# Patient Record
Sex: MALE | Race: WHITE | NOT HISPANIC OR LATINO | Employment: OTHER | RURAL
[De-identification: names, ages, dates, MRNs, and addresses within clinical notes are randomized per-mention and may not be internally consistent; named-entity substitution may affect disease eponyms.]

---

## 2020-07-13 ENCOUNTER — HISTORICAL (OUTPATIENT)
Dept: ADMINISTRATIVE | Facility: HOSPITAL | Age: 70
End: 2020-07-13

## 2020-07-13 LAB — SARS-COV+SARS-COV-2 AG RESP QL IA.RAPID: POSITIVE

## 2020-07-27 ENCOUNTER — HISTORICAL (OUTPATIENT)
Dept: ADMINISTRATIVE | Facility: HOSPITAL | Age: 70
End: 2020-07-27

## 2020-07-27 LAB — SARS-COV+SARS-COV-2 AG RESP QL IA.RAPID: NEGATIVE

## 2020-10-20 ENCOUNTER — HISTORICAL (OUTPATIENT)
Dept: ADMINISTRATIVE | Facility: HOSPITAL | Age: 70
End: 2020-10-20

## 2020-10-20 LAB
CRP SERPL-MCNC: <0.29 MG/DL (ref 0–0.8)
ERYTHROCYTE [SEDIMENTATION RATE] IN BLOOD BY WESTERGREN METHOD: 16 MM/HR (ref 0–20)

## 2021-04-02 RX ORDER — ENALAPRIL MALEATE 20 MG/1
20 TABLET ORAL DAILY
Qty: 90 TABLET | Refills: 0 | Status: SHIPPED | OUTPATIENT
Start: 2021-04-02 | End: 2021-04-09 | Stop reason: SDUPTHER

## 2021-04-02 RX ORDER — ENALAPRIL MALEATE 20 MG/1
1 TABLET ORAL DAILY
COMMUNITY
Start: 2021-01-07 | End: 2021-04-02 | Stop reason: SDUPTHER

## 2021-04-08 VITALS
HEART RATE: 61 BPM | RESPIRATION RATE: 20 BRPM | SYSTOLIC BLOOD PRESSURE: 150 MMHG | DIASTOLIC BLOOD PRESSURE: 80 MMHG | HEIGHT: 72 IN | BODY MASS INDEX: 33.18 KG/M2 | TEMPERATURE: 91 F | WEIGHT: 245 LBS | OXYGEN SATURATION: 97 %

## 2021-04-08 RX ORDER — PNV NO.95/FERROUS FUM/FOLIC AC 28MG-0.8MG
1000 TABLET ORAL DAILY
COMMUNITY
End: 2023-02-21

## 2021-04-08 RX ORDER — FOLIC ACID 1 MG/1
1 TABLET ORAL DAILY
COMMUNITY
End: 2021-09-20 | Stop reason: SDUPTHER

## 2021-04-08 RX ORDER — GABAPENTIN 300 MG/1
300 CAPSULE ORAL 3 TIMES DAILY
COMMUNITY
Start: 2021-04-01 | End: 2022-03-17 | Stop reason: SDUPTHER

## 2021-04-08 RX ORDER — CARVEDILOL 3.12 MG/1
3.12 TABLET ORAL 2 TIMES DAILY
COMMUNITY
Start: 2021-02-10 | End: 2022-06-22 | Stop reason: SDUPTHER

## 2021-04-08 RX ORDER — DULAGLUTIDE 0.75 MG/.5ML
INJECTION, SOLUTION SUBCUTANEOUS
COMMUNITY
Start: 2020-12-30 | End: 2021-06-18

## 2021-04-08 RX ORDER — CITALOPRAM 10 MG/1
10 TABLET ORAL DAILY
COMMUNITY
Start: 2021-03-02 | End: 2022-08-16 | Stop reason: SDUPTHER

## 2021-04-08 RX ORDER — ATORVASTATIN CALCIUM 40 MG/1
10 TABLET, FILM COATED ORAL DAILY
COMMUNITY
End: 2022-01-27

## 2021-04-08 RX ORDER — MOXIFLOXACIN 5 MG/ML
1 SOLUTION/ DROPS OPHTHALMIC 3 TIMES DAILY
COMMUNITY
End: 2022-01-27

## 2021-04-09 ENCOUNTER — OFFICE VISIT (OUTPATIENT)
Dept: FAMILY MEDICINE | Facility: CLINIC | Age: 71
End: 2021-04-09
Payer: MEDICARE

## 2021-04-09 VITALS
RESPIRATION RATE: 18 BRPM | SYSTOLIC BLOOD PRESSURE: 150 MMHG | OXYGEN SATURATION: 97 % | HEART RATE: 61 BPM | HEIGHT: 71 IN | BODY MASS INDEX: 36.4 KG/M2 | TEMPERATURE: 98 F | WEIGHT: 260 LBS | DIASTOLIC BLOOD PRESSURE: 76 MMHG

## 2021-04-09 DIAGNOSIS — M54.50 CHRONIC LOW BACK PAIN WITHOUT SCIATICA, UNSPECIFIED BACK PAIN LATERALITY: ICD-10-CM

## 2021-04-09 DIAGNOSIS — I10 HYPERTENSION, UNSPECIFIED TYPE: Primary | ICD-10-CM

## 2021-04-09 DIAGNOSIS — G89.29 CHRONIC LOW BACK PAIN WITHOUT SCIATICA, UNSPECIFIED BACK PAIN LATERALITY: ICD-10-CM

## 2021-04-09 DIAGNOSIS — H61.21 HEARING LOSS OF RIGHT EAR DUE TO CERUMEN IMPACTION: ICD-10-CM

## 2021-04-09 DIAGNOSIS — E13.9 DIABETES 1.5, MANAGED AS TYPE 2: ICD-10-CM

## 2021-04-09 LAB — GLUCOSE SERPL-MCNC: 164 MG/DL (ref 70–110)

## 2021-04-09 PROCEDURE — 69209 PR REMOVAL IMPACTED CERUMEN USING IRRIGATION/LAVAGE, UNILATERAL: ICD-10-PCS | Mod: RT,,, | Performed by: FAMILY MEDICINE

## 2021-04-09 PROCEDURE — 99213 OFFICE O/P EST LOW 20 MIN: CPT | Mod: 25,,, | Performed by: FAMILY MEDICINE

## 2021-04-09 PROCEDURE — 99213 PR OFFICE/OUTPT VISIT, EST, LEVL III, 20-29 MIN: ICD-10-PCS | Mod: 25,,, | Performed by: FAMILY MEDICINE

## 2021-04-09 PROCEDURE — 69209 REMOVE IMPACTED EAR WAX UNI: CPT | Mod: RT,,, | Performed by: FAMILY MEDICINE

## 2021-04-09 PROCEDURE — 82962 GLUCOSE BLOOD TEST: CPT | Mod: RHCUB | Performed by: FAMILY MEDICINE

## 2021-04-09 RX ORDER — ENALAPRIL MALEATE 20 MG/1
20 TABLET ORAL DAILY
Qty: 90 TABLET | Refills: 0 | Status: SHIPPED | OUTPATIENT
Start: 2021-04-09 | End: 2021-10-14 | Stop reason: SDUPTHER

## 2021-04-09 RX ORDER — CYCLOBENZAPRINE HCL 10 MG
10 TABLET ORAL 2 TIMES DAILY
Qty: 180 TABLET | Refills: 1 | Status: SHIPPED | OUTPATIENT
Start: 2021-04-09 | End: 2021-11-09 | Stop reason: SDUPTHER

## 2021-04-09 RX ORDER — CYCLOBENZAPRINE HCL 10 MG
10 TABLET ORAL 2 TIMES DAILY
COMMUNITY
End: 2021-04-09 | Stop reason: SDUPTHER

## 2021-05-28 DIAGNOSIS — R26.9 ABNORMALITY OF GAIT: Primary | ICD-10-CM

## 2021-06-04 ENCOUNTER — OFFICE VISIT (OUTPATIENT)
Dept: FAMILY MEDICINE | Facility: CLINIC | Age: 71
End: 2021-06-04
Payer: MEDICARE

## 2021-06-04 VITALS
HEIGHT: 72 IN | WEIGHT: 270 LBS | HEART RATE: 63 BPM | OXYGEN SATURATION: 97 % | SYSTOLIC BLOOD PRESSURE: 132 MMHG | BODY MASS INDEX: 36.57 KG/M2 | DIASTOLIC BLOOD PRESSURE: 70 MMHG | RESPIRATION RATE: 20 BRPM

## 2021-06-04 DIAGNOSIS — E13.9 DIABETES 1.5, MANAGED AS TYPE 2: Primary | ICD-10-CM

## 2021-06-04 DIAGNOSIS — Z79.899 OTHER LONG TERM (CURRENT) DRUG THERAPY: ICD-10-CM

## 2021-06-04 DIAGNOSIS — E53.8 VITAMIN B12 DEFICIENCY: ICD-10-CM

## 2021-06-04 LAB
ALBUMIN SERPL BCP-MCNC: 3.8 G/DL (ref 3.5–5)
ALBUMIN/GLOB SERPL: 1.2 {RATIO}
ALP SERPL-CCNC: 130 U/L (ref 45–115)
ALT SERPL W P-5'-P-CCNC: 26 U/L (ref 16–61)
ANION GAP SERPL CALCULATED.3IONS-SCNC: 14 MMOL/L (ref 7–16)
AST SERPL W P-5'-P-CCNC: 22 U/L (ref 15–37)
BASOPHILS # BLD AUTO: 0.03 K/UL (ref 0–0.2)
BASOPHILS NFR BLD AUTO: 0.4 % (ref 0–1)
BILIRUB SERPL-MCNC: 0.3 MG/DL (ref 0–1.2)
BUN SERPL-MCNC: 20 MG/DL (ref 7–18)
BUN/CREAT SERPL: 18 (ref 6–20)
CALCIUM SERPL-MCNC: 8.8 MG/DL (ref 8.5–10.1)
CHLORIDE SERPL-SCNC: 104 MMOL/L (ref 98–107)
CHOLEST SERPL-MCNC: 167 MG/DL (ref 0–200)
CHOLEST/HDLC SERPL: 3.8 {RATIO}
CO2 SERPL-SCNC: 26 MMOL/L (ref 21–32)
CREAT SERPL-MCNC: 1.12 MG/DL (ref 0.7–1.3)
DIFFERENTIAL METHOD BLD: ABNORMAL
EOSINOPHIL # BLD AUTO: 0.14 K/UL (ref 0–0.5)
EOSINOPHIL NFR BLD AUTO: 1.7 % (ref 1–4)
ERYTHROCYTE [DISTWIDTH] IN BLOOD BY AUTOMATED COUNT: 13.1 % (ref 11.5–14.5)
EST. AVERAGE GLUCOSE BLD GHB EST-MCNC: 214 MG/DL
GLOBULIN SER-MCNC: 3.2 G/DL (ref 2–4)
GLUCOSE SERPL-MCNC: 219 MG/DL (ref 74–106)
HBA1C MFR BLD HPLC: 9 % (ref 4.5–6.6)
HCT VFR BLD AUTO: 41.7 % (ref 40–54)
HDLC SERPL-MCNC: 44 MG/DL (ref 40–60)
HGB BLD-MCNC: 13.6 G/DL (ref 13.5–18)
IMM GRANULOCYTES # BLD AUTO: 0.04 K/UL (ref 0–0.04)
IMM GRANULOCYTES NFR BLD: 0.5 % (ref 0–0.4)
LDLC SERPL CALC-MCNC: 96 MG/DL
LDLC/HDLC SERPL: 2.2 {RATIO}
LYMPHOCYTES # BLD AUTO: 1.93 K/UL (ref 1–4.8)
LYMPHOCYTES NFR BLD AUTO: 23.4 % (ref 27–41)
MCH RBC QN AUTO: 29.7 PG (ref 27–31)
MCHC RBC AUTO-ENTMCNC: 32.6 G/DL (ref 32–36)
MCV RBC AUTO: 91 FL (ref 80–96)
MONOCYTES # BLD AUTO: 0.7 K/UL (ref 0–0.8)
MONOCYTES NFR BLD AUTO: 8.5 % (ref 2–6)
MPC BLD CALC-MCNC: 10.9 FL (ref 9.4–12.4)
NEUTROPHILS # BLD AUTO: 5.4 K/UL (ref 1.8–7.7)
NEUTROPHILS NFR BLD AUTO: 65.5 % (ref 53–65)
NONHDLC SERPL-MCNC: 123 MG/DL
NRBC # BLD AUTO: 0 X10E3/UL
NRBC, AUTO (.00): 0 %
PLATELET # BLD AUTO: 223 K/UL (ref 150–400)
POTASSIUM SERPL-SCNC: 4.5 MMOL/L (ref 3.5–5.1)
PROT SERPL-MCNC: 7 G/DL (ref 6.4–8.2)
RBC # BLD AUTO: 4.58 M/UL (ref 4.6–6.2)
SODIUM SERPL-SCNC: 139 MMOL/L (ref 136–145)
TRIGL SERPL-MCNC: 137 MG/DL (ref 35–150)
VIT B12 SERPL-MCNC: 291 PG/ML (ref 193–986)
VLDLC SERPL-MCNC: 27 MG/DL
WBC # BLD AUTO: 8.24 K/UL (ref 4.5–11)

## 2021-06-04 PROCEDURE — 99213 OFFICE O/P EST LOW 20 MIN: CPT | Mod: ,,, | Performed by: FAMILY MEDICINE

## 2021-06-04 PROCEDURE — 80061 LIPID PANEL: CPT | Mod: ,,, | Performed by: CLINICAL MEDICAL LABORATORY

## 2021-06-04 PROCEDURE — 80053 COMPREHEN METABOLIC PANEL: CPT | Mod: ,,, | Performed by: CLINICAL MEDICAL LABORATORY

## 2021-06-04 PROCEDURE — 83036 HEMOGLOBIN GLYCOSYLATED A1C: CPT | Mod: ,,, | Performed by: CLINICAL MEDICAL LABORATORY

## 2021-06-04 PROCEDURE — 80061 LIPID PANEL: ICD-10-PCS | Mod: ,,, | Performed by: CLINICAL MEDICAL LABORATORY

## 2021-06-04 PROCEDURE — 83036 HEMOGLOBIN A1C: ICD-10-PCS | Mod: ,,, | Performed by: CLINICAL MEDICAL LABORATORY

## 2021-06-04 PROCEDURE — 80053 COMPREHENSIVE METABOLIC PANEL: ICD-10-PCS | Mod: ,,, | Performed by: CLINICAL MEDICAL LABORATORY

## 2021-06-04 PROCEDURE — 99213 PR OFFICE/OUTPT VISIT, EST, LEVL III, 20-29 MIN: ICD-10-PCS | Mod: ,,, | Performed by: FAMILY MEDICINE

## 2021-06-04 PROCEDURE — 82607 VITAMIN B12: ICD-10-PCS | Mod: ,,, | Performed by: CLINICAL MEDICAL LABORATORY

## 2021-06-04 PROCEDURE — 82607 VITAMIN B-12: CPT | Mod: ,,, | Performed by: CLINICAL MEDICAL LABORATORY

## 2021-06-04 PROCEDURE — 85025 COMPLETE CBC W/AUTO DIFF WBC: CPT | Mod: ,,, | Performed by: CLINICAL MEDICAL LABORATORY

## 2021-06-04 PROCEDURE — 85025 CBC WITH DIFFERENTIAL: ICD-10-PCS | Mod: ,,, | Performed by: CLINICAL MEDICAL LABORATORY

## 2021-06-07 ENCOUNTER — TELEPHONE (OUTPATIENT)
Dept: FAMILY MEDICINE | Facility: CLINIC | Age: 71
End: 2021-06-07

## 2021-06-08 ENCOUNTER — CLINICAL SUPPORT (OUTPATIENT)
Dept: REHABILITATION | Facility: HOSPITAL | Age: 71
End: 2021-06-08
Payer: MEDICARE

## 2021-06-08 ENCOUNTER — TELEPHONE (OUTPATIENT)
Dept: FAMILY MEDICINE | Facility: CLINIC | Age: 71
End: 2021-06-08

## 2021-06-08 DIAGNOSIS — R26.89 IMPAIRMENT OF BALANCE: ICD-10-CM

## 2021-06-08 DIAGNOSIS — R26.9 ABNORMALITY OF GAIT: Primary | ICD-10-CM

## 2021-06-08 DIAGNOSIS — R29.898 LEG WEAKNESS, BILATERAL: ICD-10-CM

## 2021-06-08 PROCEDURE — 97162 PT EVAL MOD COMPLEX 30 MIN: CPT

## 2021-06-11 ENCOUNTER — CLINICAL SUPPORT (OUTPATIENT)
Dept: REHABILITATION | Facility: HOSPITAL | Age: 71
End: 2021-06-11
Payer: MEDICARE

## 2021-06-11 DIAGNOSIS — R29.898 LEG WEAKNESS, BILATERAL: Primary | ICD-10-CM

## 2021-06-11 DIAGNOSIS — R26.89 IMPAIRMENT OF BALANCE: ICD-10-CM

## 2021-06-11 PROCEDURE — 97110 THERAPEUTIC EXERCISES: CPT

## 2021-06-15 ENCOUNTER — CLINICAL SUPPORT (OUTPATIENT)
Dept: REHABILITATION | Facility: HOSPITAL | Age: 71
End: 2021-06-15
Payer: MEDICARE

## 2021-06-15 DIAGNOSIS — R26.9 ABNORMALITY OF GAIT: Primary | ICD-10-CM

## 2021-06-15 DIAGNOSIS — R29.898 LEG WEAKNESS, BILATERAL: ICD-10-CM

## 2021-06-15 DIAGNOSIS — R26.89 IMPAIRMENT OF BALANCE: ICD-10-CM

## 2021-06-15 PROCEDURE — 97110 THERAPEUTIC EXERCISES: CPT

## 2021-06-15 PROCEDURE — 97112 NEUROMUSCULAR REEDUCATION: CPT

## 2021-06-17 ENCOUNTER — CLINICAL SUPPORT (OUTPATIENT)
Dept: REHABILITATION | Facility: HOSPITAL | Age: 71
End: 2021-06-17
Payer: MEDICARE

## 2021-06-17 DIAGNOSIS — R26.9 ABNORMALITY OF GAIT: Primary | ICD-10-CM

## 2021-06-17 DIAGNOSIS — R29.898 LEG WEAKNESS, BILATERAL: ICD-10-CM

## 2021-06-17 DIAGNOSIS — R26.89 IMPAIRMENT OF BALANCE: ICD-10-CM

## 2021-06-17 PROCEDURE — 97112 NEUROMUSCULAR REEDUCATION: CPT

## 2021-06-17 PROCEDURE — 97110 THERAPEUTIC EXERCISES: CPT

## 2021-06-18 ENCOUNTER — OFFICE VISIT (OUTPATIENT)
Dept: FAMILY MEDICINE | Facility: CLINIC | Age: 71
End: 2021-06-18
Payer: MEDICARE

## 2021-06-18 VITALS
BODY MASS INDEX: 36.57 KG/M2 | SYSTOLIC BLOOD PRESSURE: 154 MMHG | RESPIRATION RATE: 20 BRPM | OXYGEN SATURATION: 96 % | WEIGHT: 270 LBS | HEIGHT: 72 IN | HEART RATE: 56 BPM | DIASTOLIC BLOOD PRESSURE: 74 MMHG

## 2021-06-18 DIAGNOSIS — E53.8 LOW SERUM VITAMIN B12: ICD-10-CM

## 2021-06-18 DIAGNOSIS — E11.9 DIABETES MELLITUS WITHOUT COMPLICATION: Primary | ICD-10-CM

## 2021-06-18 DIAGNOSIS — R74.8 ELEVATED LIVER ENZYMES: ICD-10-CM

## 2021-06-18 PROCEDURE — 99213 PR OFFICE/OUTPT VISIT, EST, LEVL III, 20-29 MIN: ICD-10-PCS | Mod: 25,,, | Performed by: FAMILY MEDICINE

## 2021-06-18 PROCEDURE — 96372 THER/PROPH/DIAG INJ SC/IM: CPT | Mod: ,,, | Performed by: FAMILY MEDICINE

## 2021-06-18 PROCEDURE — 99213 OFFICE O/P EST LOW 20 MIN: CPT | Mod: 25,,, | Performed by: FAMILY MEDICINE

## 2021-06-18 PROCEDURE — 96372 PR INJECTION,THERAP/PROPH/DIAG2ST, IM OR SUBCUT: ICD-10-PCS | Mod: ,,, | Performed by: FAMILY MEDICINE

## 2021-06-18 RX ORDER — CYANOCOBALAMIN 1000 UG/ML
1000 INJECTION, SOLUTION INTRAMUSCULAR; SUBCUTANEOUS
Status: COMPLETED | OUTPATIENT
Start: 2021-06-18 | End: 2021-06-18

## 2021-06-18 RX ORDER — DULAGLUTIDE 1.5 MG/.5ML
1.5 INJECTION, SOLUTION SUBCUTANEOUS
Qty: 4 PEN | Refills: 3 | Status: SHIPPED | OUTPATIENT
Start: 2021-06-18 | End: 2021-09-16 | Stop reason: DRUGHIGH

## 2021-06-18 RX ADMIN — CYANOCOBALAMIN 1000 MCG: 1000 INJECTION, SOLUTION INTRAMUSCULAR; SUBCUTANEOUS at 02:06

## 2021-06-23 ENCOUNTER — CLINICAL SUPPORT (OUTPATIENT)
Dept: REHABILITATION | Facility: HOSPITAL | Age: 71
End: 2021-06-23
Payer: MEDICARE

## 2021-06-23 DIAGNOSIS — R26.89 IMPAIRMENT OF BALANCE: ICD-10-CM

## 2021-06-23 DIAGNOSIS — R29.898 LEG WEAKNESS, BILATERAL: ICD-10-CM

## 2021-06-23 DIAGNOSIS — R26.9 ABNORMALITY OF GAIT: Primary | ICD-10-CM

## 2021-06-23 PROCEDURE — 97110 THERAPEUTIC EXERCISES: CPT

## 2021-06-23 PROCEDURE — 97112 NEUROMUSCULAR REEDUCATION: CPT

## 2021-06-25 ENCOUNTER — CLINICAL SUPPORT (OUTPATIENT)
Dept: REHABILITATION | Facility: HOSPITAL | Age: 71
End: 2021-06-25
Payer: MEDICARE

## 2021-06-25 DIAGNOSIS — R26.89 IMPAIRMENT OF BALANCE: ICD-10-CM

## 2021-06-25 DIAGNOSIS — R26.9 ABNORMALITY OF GAIT: Primary | ICD-10-CM

## 2021-06-25 DIAGNOSIS — R29.898 LEG WEAKNESS, BILATERAL: ICD-10-CM

## 2021-06-25 PROCEDURE — 97110 THERAPEUTIC EXERCISES: CPT

## 2021-06-25 PROCEDURE — 97112 NEUROMUSCULAR REEDUCATION: CPT

## 2021-06-29 ENCOUNTER — CLINICAL SUPPORT (OUTPATIENT)
Dept: REHABILITATION | Facility: HOSPITAL | Age: 71
End: 2021-06-29
Payer: MEDICARE

## 2021-06-29 DIAGNOSIS — R26.89 IMPAIRMENT OF BALANCE: Primary | ICD-10-CM

## 2021-06-29 DIAGNOSIS — R29.898 LEG WEAKNESS, BILATERAL: ICD-10-CM

## 2021-06-29 PROCEDURE — 97112 NEUROMUSCULAR REEDUCATION: CPT

## 2021-06-29 PROCEDURE — 97110 THERAPEUTIC EXERCISES: CPT

## 2021-07-01 ENCOUNTER — CLINICAL SUPPORT (OUTPATIENT)
Dept: REHABILITATION | Facility: HOSPITAL | Age: 71
End: 2021-07-01
Payer: MEDICARE

## 2021-07-01 ENCOUNTER — PATIENT MESSAGE (OUTPATIENT)
Dept: ADMINISTRATIVE | Facility: OTHER | Age: 71
End: 2021-07-01

## 2021-07-01 DIAGNOSIS — R26.89 IMPAIRMENT OF BALANCE: ICD-10-CM

## 2021-07-01 DIAGNOSIS — R29.898 LEG WEAKNESS, BILATERAL: ICD-10-CM

## 2021-07-01 DIAGNOSIS — R26.9 ABNORMALITY OF GAIT: Primary | ICD-10-CM

## 2021-07-01 PROCEDURE — 97112 NEUROMUSCULAR REEDUCATION: CPT

## 2021-07-01 PROCEDURE — 97110 THERAPEUTIC EXERCISES: CPT

## 2021-07-02 ENCOUNTER — TELEPHONE (OUTPATIENT)
Dept: FAMILY MEDICINE | Facility: CLINIC | Age: 71
End: 2021-07-02

## 2021-07-02 DIAGNOSIS — R74.8 ABNORMAL LEVELS OF OTHER SERUM ENZYMES: Primary | ICD-10-CM

## 2021-07-09 ENCOUNTER — CLINICAL SUPPORT (OUTPATIENT)
Dept: REHABILITATION | Facility: HOSPITAL | Age: 71
End: 2021-07-09
Payer: MEDICARE

## 2021-07-09 DIAGNOSIS — R26.9 ABNORMALITY OF GAIT: Primary | ICD-10-CM

## 2021-07-09 DIAGNOSIS — R26.89 IMPAIRMENT OF BALANCE: ICD-10-CM

## 2021-07-09 DIAGNOSIS — R29.898 LEG WEAKNESS, BILATERAL: ICD-10-CM

## 2021-07-09 PROCEDURE — 97110 THERAPEUTIC EXERCISES: CPT

## 2021-07-12 ENCOUNTER — HOSPITAL ENCOUNTER (OUTPATIENT)
Dept: RADIOLOGY | Facility: HOSPITAL | Age: 71
Discharge: HOME | End: 2021-07-12
Attending: FAMILY MEDICINE
Payer: MEDICARE

## 2021-07-12 ENCOUNTER — TELEPHONE (OUTPATIENT)
Dept: FAMILY MEDICINE | Facility: CLINIC | Age: 71
End: 2021-07-12

## 2021-07-12 ENCOUNTER — OFFICE VISIT (OUTPATIENT)
Dept: FAMILY MEDICINE | Facility: CLINIC | Age: 71
End: 2021-07-12
Payer: MEDICARE

## 2021-07-12 VITALS
HEART RATE: 87 BPM | OXYGEN SATURATION: 94 % | DIASTOLIC BLOOD PRESSURE: 58 MMHG | HEIGHT: 72 IN | SYSTOLIC BLOOD PRESSURE: 156 MMHG | WEIGHT: 270 LBS | RESPIRATION RATE: 18 BRPM | BODY MASS INDEX: 36.57 KG/M2

## 2021-07-12 DIAGNOSIS — N28.89 RENAL MASS, RIGHT: Primary | ICD-10-CM

## 2021-07-12 DIAGNOSIS — R74.8 ABNORMAL LEVELS OF OTHER SERUM ENZYMES: ICD-10-CM

## 2021-07-12 DIAGNOSIS — N28.89 OTHER SPECIFIED DISORDERS OF KIDNEY AND URETER: ICD-10-CM

## 2021-07-12 PROCEDURE — 99212 OFFICE O/P EST SF 10 MIN: CPT | Mod: ,,, | Performed by: FAMILY MEDICINE

## 2021-07-12 PROCEDURE — 76705 ECHO EXAM OF ABDOMEN: CPT | Mod: TC

## 2021-07-12 PROCEDURE — 76705 US ABDOMEN LIMITED_LIVER: ICD-10-PCS | Mod: 26,,, | Performed by: STUDENT IN AN ORGANIZED HEALTH CARE EDUCATION/TRAINING PROGRAM

## 2021-07-12 PROCEDURE — 76705 ECHO EXAM OF ABDOMEN: CPT | Mod: 26,,, | Performed by: STUDENT IN AN ORGANIZED HEALTH CARE EDUCATION/TRAINING PROGRAM

## 2021-07-12 PROCEDURE — 99212 PR OFFICE/OUTPT VISIT, EST, LEVL II, 10-19 MIN: ICD-10-PCS | Mod: ,,, | Performed by: FAMILY MEDICINE

## 2021-07-14 ENCOUNTER — CLINICAL SUPPORT (OUTPATIENT)
Dept: REHABILITATION | Facility: HOSPITAL | Age: 71
End: 2021-07-14
Payer: MEDICARE

## 2021-07-14 DIAGNOSIS — R29.898 LEG WEAKNESS, BILATERAL: ICD-10-CM

## 2021-07-14 DIAGNOSIS — R26.89 IMPAIRMENT OF BALANCE: Primary | ICD-10-CM

## 2021-07-14 PROCEDURE — 97110 THERAPEUTIC EXERCISES: CPT

## 2021-07-14 PROCEDURE — 97112 NEUROMUSCULAR REEDUCATION: CPT

## 2021-07-20 ENCOUNTER — TELEPHONE (OUTPATIENT)
Dept: FAMILY MEDICINE | Facility: CLINIC | Age: 71
End: 2021-07-20

## 2021-07-21 ENCOUNTER — DOCUMENTATION ONLY (OUTPATIENT)
Dept: REHABILITATION | Facility: HOSPITAL | Age: 71
End: 2021-07-21

## 2021-07-21 NOTE — PROGRESS NOTES
PHYSICAL THERAPY DISCHARGE:    Name: Davon STAPLES [facility] Number: [id number]  Diagnosis: Abnormality of Gait       Encounter Diagnoses   Name Primary?    Impairment of balance Yes    Leg weakness, bilateral        Physician: Romulo Rice MD    SUBJECTIVE    This patient was originally evaluated at our facility on: 6/8/21    Pt attended PT for a total of 10 Visits receiving: Therex, Body Mechanics Training, balance training and Home Exercise Instruction.    This patient's last visit occurred on: 7/14/21    OBJECTIVE:    Knee AROM Right Left   extension WFL -25   flexion WFL 65      Knee Strength Right Left   extension 5/5 3-/5   flexion 5/5 3-/5      Hip Flexion AROM  Right 110 degrees  Left 105 degrees     Hip Strength Right Left   Flexion 5/5 4-   Abduction 4/5 4-   Extension 3+/5 3+     ASSESSMENT:    Patient has a left knee contracture which contributes to his gait instability. Therapy unable to improve or correct knee contracture.    Short Term Goals: 3 weeks   1. Patient will increase bilateral hip flexion to 100 degrees to allow patient to perform activities of daily living without limitations. Goal Met.  2. Patient will increase bilateral lower extremity hip strength to 4+/5 to allow patient to ambulate without deviations. Right LE Met.  3. Pt will increase left knee flexion to 75 degrees, knee extension to 5 degrees to allow patient normal gait pattern. Unable to improve due to knee contracture.  4. Pt will increase bilateral knee strength to 4+/5 to allow patient to safely return to prior level of function. Right LE Met. Unable to improve left knee due to contracture.     Long Term Goals: 6 weeks   1. Pt will improve Tinetti Gait and Balance to 25/28 to reduce fall risk to low for safety with ambulation. Tinetti score 24/28 low fall risk.  2. Pt will improve TUG test to 12 seconds or less to reduce fall risk and increase gait speed and safety. Not Met.      Pt was DC'd from our care secondary to:  Patient's spouse notified therapist that patient will discontinue therapy secondary to recent diagnosis of kidney cancer requiring surgery.       Therapist: Adin Kaminski, PT  7/21/2021

## 2021-08-20 ENCOUNTER — OFFICE VISIT (OUTPATIENT)
Dept: FAMILY MEDICINE | Facility: CLINIC | Age: 71
End: 2021-08-20
Payer: MEDICARE

## 2021-08-20 VITALS
SYSTOLIC BLOOD PRESSURE: 128 MMHG | WEIGHT: 260 LBS | BODY MASS INDEX: 35.21 KG/M2 | HEART RATE: 80 BPM | DIASTOLIC BLOOD PRESSURE: 72 MMHG | RESPIRATION RATE: 20 BRPM | HEIGHT: 72 IN | TEMPERATURE: 98 F | OXYGEN SATURATION: 96 %

## 2021-08-20 DIAGNOSIS — E78.5 HYPERLIPIDEMIA, UNSPECIFIED HYPERLIPIDEMIA TYPE: ICD-10-CM

## 2021-08-20 DIAGNOSIS — E11.9 DIABETES MELLITUS WITHOUT COMPLICATION: Primary | ICD-10-CM

## 2021-08-20 DIAGNOSIS — E53.8 VITAMIN B12 DEFICIENCY: ICD-10-CM

## 2021-08-20 PROCEDURE — 99214 PR OFFICE/OUTPT VISIT, EST, LEVL IV, 30-39 MIN: ICD-10-PCS | Mod: 25,,, | Performed by: FAMILY MEDICINE

## 2021-08-20 PROCEDURE — 96372 THER/PROPH/DIAG INJ SC/IM: CPT | Mod: ,,, | Performed by: FAMILY MEDICINE

## 2021-08-20 PROCEDURE — 99214 OFFICE O/P EST MOD 30 MIN: CPT | Mod: 25,,, | Performed by: FAMILY MEDICINE

## 2021-08-20 PROCEDURE — 96372 PR INJECTION,THERAP/PROPH/DIAG2ST, IM OR SUBCUT: ICD-10-PCS | Mod: ,,, | Performed by: FAMILY MEDICINE

## 2021-08-20 RX ORDER — CYANOCOBALAMIN 1000 UG/ML
1000 INJECTION, SOLUTION INTRAMUSCULAR; SUBCUTANEOUS ONCE
Status: COMPLETED | OUTPATIENT
Start: 2021-08-20 | End: 2021-08-20

## 2021-08-20 RX ADMIN — CYANOCOBALAMIN 1000 MCG: 1000 INJECTION, SOLUTION INTRAMUSCULAR; SUBCUTANEOUS at 03:08

## 2021-09-16 ENCOUNTER — OFFICE VISIT (OUTPATIENT)
Dept: FAMILY MEDICINE | Facility: CLINIC | Age: 71
End: 2021-09-16
Payer: MEDICARE

## 2021-09-16 VITALS
BODY MASS INDEX: 35.21 KG/M2 | OXYGEN SATURATION: 98 % | SYSTOLIC BLOOD PRESSURE: 136 MMHG | DIASTOLIC BLOOD PRESSURE: 72 MMHG | HEART RATE: 65 BPM | WEIGHT: 260 LBS | HEIGHT: 72 IN | RESPIRATION RATE: 18 BRPM

## 2021-09-16 DIAGNOSIS — G47.30 SLEEP APNEA, UNSPECIFIED TYPE: ICD-10-CM

## 2021-09-16 DIAGNOSIS — Z23 NEED FOR PROPHYLACTIC VACCINATION AND INOCULATION AGAINST INFLUENZA: ICD-10-CM

## 2021-09-16 DIAGNOSIS — E11.9 DIABETES MELLITUS WITHOUT COMPLICATION: ICD-10-CM

## 2021-09-16 DIAGNOSIS — R44.3 HALLUCINATIONS: Primary | ICD-10-CM

## 2021-09-16 PROCEDURE — 90686 FLU VACCINE (QUAD) GREATER THAN OR EQUAL TO 3YO PRESERVATIVE FREE IM: ICD-10-PCS | Mod: ,,, | Performed by: FAMILY MEDICINE

## 2021-09-16 PROCEDURE — G0008 ADMIN INFLUENZA VIRUS VAC: HCPCS | Mod: ,,, | Performed by: FAMILY MEDICINE

## 2021-09-16 PROCEDURE — 99213 PR OFFICE/OUTPT VISIT, EST, LEVL III, 20-29 MIN: ICD-10-PCS | Mod: ,,, | Performed by: FAMILY MEDICINE

## 2021-09-16 PROCEDURE — 99213 OFFICE O/P EST LOW 20 MIN: CPT | Mod: ,,, | Performed by: FAMILY MEDICINE

## 2021-09-16 PROCEDURE — 90686 IIV4 VACC NO PRSV 0.5 ML IM: CPT | Mod: ,,, | Performed by: FAMILY MEDICINE

## 2021-09-16 PROCEDURE — G0008 FLU VACCINE (QUAD) GREATER THAN OR EQUAL TO 3YO PRESERVATIVE FREE IM: ICD-10-PCS | Mod: ,,, | Performed by: FAMILY MEDICINE

## 2021-09-16 RX ORDER — DULAGLUTIDE 3 MG/.5ML
3 INJECTION, SOLUTION SUBCUTANEOUS
Qty: 4 PEN | Refills: 5 | Status: SHIPPED | OUTPATIENT
Start: 2021-09-16 | End: 2022-03-03

## 2021-09-20 RX ORDER — FOLIC ACID 1 MG/1
1 TABLET ORAL DAILY
Qty: 90 TABLET | Refills: 1 | Status: SHIPPED | OUTPATIENT
Start: 2021-09-20 | End: 2023-02-21

## 2021-09-30 ENCOUNTER — OFFICE VISIT (OUTPATIENT)
Dept: FAMILY MEDICINE | Facility: CLINIC | Age: 71
End: 2021-09-30
Payer: MEDICARE

## 2021-09-30 VITALS
TEMPERATURE: 98 F | WEIGHT: 260 LBS | BODY MASS INDEX: 35.21 KG/M2 | HEART RATE: 73 BPM | HEIGHT: 72 IN | DIASTOLIC BLOOD PRESSURE: 70 MMHG | RESPIRATION RATE: 18 BRPM | SYSTOLIC BLOOD PRESSURE: 144 MMHG | OXYGEN SATURATION: 97 %

## 2021-09-30 DIAGNOSIS — E11.9 DIABETES MELLITUS WITHOUT COMPLICATION: Primary | ICD-10-CM

## 2021-09-30 PROCEDURE — 99212 PR OFFICE/OUTPT VISIT, EST, LEVL II, 10-19 MIN: ICD-10-PCS | Mod: ,,, | Performed by: FAMILY MEDICINE

## 2021-09-30 PROCEDURE — 99212 OFFICE O/P EST SF 10 MIN: CPT | Mod: ,,, | Performed by: FAMILY MEDICINE

## 2021-10-01 DIAGNOSIS — R41.0 CONFUSION: Primary | ICD-10-CM

## 2021-10-14 DIAGNOSIS — I10 HYPERTENSION, UNSPECIFIED TYPE: ICD-10-CM

## 2021-10-14 RX ORDER — ENALAPRIL MALEATE 20 MG/1
20 TABLET ORAL DAILY
Qty: 90 TABLET | Refills: 0 | Status: SHIPPED | OUTPATIENT
Start: 2021-10-14 | End: 2022-01-26 | Stop reason: SDUPTHER

## 2021-11-09 DIAGNOSIS — M54.50 CHRONIC LOW BACK PAIN WITHOUT SCIATICA, UNSPECIFIED BACK PAIN LATERALITY: ICD-10-CM

## 2021-11-09 DIAGNOSIS — G89.29 CHRONIC LOW BACK PAIN WITHOUT SCIATICA, UNSPECIFIED BACK PAIN LATERALITY: ICD-10-CM

## 2021-11-09 RX ORDER — CYCLOBENZAPRINE HCL 10 MG
10 TABLET ORAL 2 TIMES DAILY
Qty: 180 TABLET | Refills: 1 | Status: SHIPPED | OUTPATIENT
Start: 2021-11-09 | End: 2022-05-17

## 2022-01-07 ENCOUNTER — OFFICE VISIT (OUTPATIENT)
Dept: FAMILY MEDICINE | Facility: CLINIC | Age: 72
End: 2022-01-07
Payer: MEDICARE

## 2022-01-07 DIAGNOSIS — R19.4 CHANGE IN BOWEL HABITS: ICD-10-CM

## 2022-01-07 DIAGNOSIS — E11.9 DIABETES MELLITUS WITHOUT COMPLICATION: ICD-10-CM

## 2022-01-07 DIAGNOSIS — K59.00 CONSTIPATION, UNSPECIFIED CONSTIPATION TYPE: Primary | ICD-10-CM

## 2022-01-07 PROCEDURE — 99212 PR OFFICE/OUTPT VISIT, EST, LEVL II, 10-19 MIN: ICD-10-PCS | Mod: ,,, | Performed by: FAMILY MEDICINE

## 2022-01-07 PROCEDURE — 99212 OFFICE O/P EST SF 10 MIN: CPT | Mod: ,,, | Performed by: FAMILY MEDICINE

## 2022-01-12 VITALS
HEIGHT: 72 IN | OXYGEN SATURATION: 99 % | DIASTOLIC BLOOD PRESSURE: 88 MMHG | WEIGHT: 260 LBS | BODY MASS INDEX: 35.21 KG/M2 | HEART RATE: 62 BPM | SYSTOLIC BLOOD PRESSURE: 116 MMHG | RESPIRATION RATE: 18 BRPM | TEMPERATURE: 98 F

## 2022-01-12 NOTE — PROGRESS NOTES
Davon Barrios is a 71 y.o. male seen today for symptoms of increased constipation with fecal incontinence at night.  Patient has had no recent GI evaluation and colonoscopy.  He denies any blood in his stool.  Blood sugars are mildly elevated I encouraged him to continue to take his medication and improve his diet.    Past Medical History:   Diagnosis Date    Carcinoma of prostate     Hypertension     Type 2 diabetes mellitus     Vitamin B 12 deficiency(non anemic)      Family History   Problem Relation Age of Onset    Diabetes Mother     Rectal cancer Father     Diabetes Father     Hypertension Father     Lung cancer Father     Hypertension Sister     Hypertension Brother      Current Outpatient Medications on File Prior to Visit   Medication Sig Dispense Refill    ADULT LOW DOSE ASPIRIN ORAL Take 81 mg by mouth.      atorvastatin (LIPITOR) 40 MG tablet Take 40 mg by mouth once daily.      carvediloL (COREG) 3.125 MG tablet       citalopram (CELEXA) 10 MG tablet       cyclobenzaprine (FLEXERIL) 10 MG tablet Take 1 tablet (10 mg total) by mouth 2 (two) times a day. 180 tablet 1    dulaglutide (TRULICITY) 3 mg/0.5 mL pen injector Inject 3 mg into the skin every 7 days. 4 pen 5    enalapril (VASOTEC) 20 MG tablet Take 1 tablet (20 mg total) by mouth once daily. 90 tablet 0    folic acid (FOLVITE) 1 MG tablet Take 1 tablet (1 mg total) by mouth once daily. 90 tablet 1    gabapentin (NEURONTIN) 300 MG capsule       insulin aspart prot/insuln asp (NOVOLOG MIX 70-30 U-100 INSULN SUBQ) Inject subcutaneously 50 units every morning and 50 units every night      moxifloxacin (VIGAMOX) 0.5 % ophthalmic solution 1 drop 3 (three) times daily.      vitamin E 1000 UNIT capsule Take 1,000 Units by mouth once daily.       No current facility-administered medications on file prior to visit.     Immunization History   Administered Date(s) Administered    COVID-19, MRNA, LN-S, PF (MODERNA FULL 0.5 ML DOSE)  03/05/2021, 04/06/2021    Influenza - High Dose - PF (65 years and older) 03/01/2021    Influenza - Quadrivalent - PF *Preferred* (6 months and older) 09/16/2021    Influenza Split 01/01/2015    Pneumococcal Conjugate - 13 Valent 03/01/2021       Review of Systems   Constitutional: Negative for fever, malaise/fatigue and weight loss.   Respiratory: Negative for shortness of breath.    Cardiovascular: Negative for chest pain and palpitations.   Gastrointestinal: Positive for constipation. Negative for blood in stool, nausea and vomiting.   Psychiatric/Behavioral: Negative for depression.        Vitals:    01/12/22 0832   BP: 116/88   Pulse: 62   Resp: 18   Temp: 98.1 °F (36.7 °C)       Physical Exam  Vitals reviewed.   Constitutional:       Appearance: Normal appearance.   HENT:      Head: Normocephalic.   Eyes:      Extraocular Movements: Extraocular movements intact.      Conjunctiva/sclera: Conjunctivae normal.      Pupils: Pupils are equal, round, and reactive to light.   Neck:      Thyroid: No thyroid mass or thyromegaly.   Cardiovascular:      Rate and Rhythm: Normal rate and regular rhythm.      Heart sounds: Normal heart sounds. No murmur heard.  No gallop.    Pulmonary:      Effort: Pulmonary effort is normal. No respiratory distress.      Breath sounds: Normal breath sounds. No wheezing or rales.   Abdominal:      General: Bowel sounds are normal.      Tenderness: There is no abdominal tenderness.   Skin:     General: Skin is warm and dry.      Coloration: Skin is not jaundiced or pale.   Neurological:      Mental Status: He is alert.   Psychiatric:         Mood and Affect: Mood normal.         Behavior: Behavior normal.         Thought Content: Thought content normal.         Judgment: Judgment normal.          Assessment and Plan  Constipation, unspecified constipation type  -     Ambulatory referral/consult to Gastroenterology; Future; Expected date: 01/19/2022    Diabetes mellitus without  complication    Change in bowel habits  -     Ambulatory referral/consult to Gastroenterology; Future; Expected date: 01/19/2022            Return to clinic in 3 weeks with his blood sugar readings.    Health Maintenance Topics with due status: Not Due       Topic Last Completion Date    Lipid Panel 09/07/2021    Low Dose Statin 01/12/2022

## 2022-01-13 ENCOUNTER — TELEPHONE (OUTPATIENT)
Dept: GASTROENTEROLOGY | Facility: CLINIC | Age: 72
End: 2022-01-13
Payer: MEDICARE

## 2022-01-13 ENCOUNTER — OFFICE VISIT (OUTPATIENT)
Dept: GASTROENTEROLOGY | Facility: CLINIC | Age: 72
End: 2022-01-13
Payer: MEDICARE

## 2022-01-13 VITALS
OXYGEN SATURATION: 97 % | DIASTOLIC BLOOD PRESSURE: 65 MMHG | HEIGHT: 70 IN | HEART RATE: 103 BPM | RESPIRATION RATE: 14 BRPM | WEIGHT: 260 LBS | SYSTOLIC BLOOD PRESSURE: 108 MMHG | BODY MASS INDEX: 37.22 KG/M2

## 2022-01-13 DIAGNOSIS — Z80.0 FAMILY HISTORY OF COLON CANCER: ICD-10-CM

## 2022-01-13 DIAGNOSIS — R19.4 CHANGE IN BOWEL HABITS: ICD-10-CM

## 2022-01-13 DIAGNOSIS — R19.7 DIARRHEA, UNSPECIFIED TYPE: Primary | ICD-10-CM

## 2022-01-13 DIAGNOSIS — Z80.0 FAMILY HISTORY OF COLON CANCER IN FATHER: ICD-10-CM

## 2022-01-13 PROCEDURE — 99204 PR OFFICE/OUTPT VISIT, NEW, LEVL IV, 45-59 MIN: ICD-10-PCS | Mod: ,,, | Performed by: NURSE PRACTITIONER

## 2022-01-13 PROCEDURE — 99204 OFFICE O/P NEW MOD 45 MIN: CPT | Mod: ,,, | Performed by: NURSE PRACTITIONER

## 2022-01-13 RX ORDER — LEVETIRACETAM 500 MG/1
TABLET ORAL
COMMUNITY
End: 2022-01-27

## 2022-01-13 RX ORDER — SYRINGE AND NEEDLE,INSULIN,1ML 25GX1"
SYRINGE, EMPTY DISPOSABLE MISCELLANEOUS
COMMUNITY

## 2022-01-13 RX ORDER — MELOXICAM 7.5 MG/1
TABLET ORAL
COMMUNITY
End: 2022-01-27

## 2022-01-13 RX ORDER — MUPIROCIN 20 MG/G
OINTMENT TOPICAL
COMMUNITY
End: 2022-01-27

## 2022-01-13 RX ORDER — ENALAPRIL MALEATE AND HYDROCHLOROTHIAZIDE 10; 25 MG/1; MG/1
TABLET ORAL
COMMUNITY
End: 2022-01-27

## 2022-01-13 RX ORDER — INSULIN GLARGINE 100 [IU]/ML
INJECTION, SOLUTION SUBCUTANEOUS
COMMUNITY
End: 2022-01-27

## 2022-01-13 RX ORDER — LATANOPROST 50 UG/ML
SOLUTION/ DROPS OPHTHALMIC
COMMUNITY
Start: 2021-09-17

## 2022-01-13 RX ORDER — FUROSEMIDE 20 MG/1
20 TABLET ORAL DAILY
COMMUNITY
End: 2022-03-03 | Stop reason: SDUPTHER

## 2022-01-13 RX ORDER — QUINAPRIL 20 MG/1
TABLET ORAL
COMMUNITY
End: 2022-01-27

## 2022-01-13 RX ORDER — ALENDRONATE SODIUM 70 MG/1
TABLET ORAL
COMMUNITY
End: 2022-01-27

## 2022-01-13 RX ORDER — ERGOCALCIFEROL 1.25 MG/1
CAPSULE ORAL
COMMUNITY
End: 2023-02-21

## 2022-01-13 RX ORDER — OXYCODONE AND ACETAMINOPHEN 7.5; 325 MG/1; MG/1
TABLET ORAL
COMMUNITY
End: 2022-01-27

## 2022-01-13 RX ORDER — NEOMYCIN SULFATE, POLYMYXIN B SULFATE AND HYDROCORTISONE 10; 3.5; 1 MG/ML; MG/ML; [USP'U]/ML
SUSPENSION/ DROPS AURICULAR (OTIC)
COMMUNITY
End: 2022-01-27

## 2022-01-13 RX ORDER — INSULIN LISPRO 100 [IU]/ML
INJECTION, SUSPENSION SUBCUTANEOUS
COMMUNITY
End: 2022-01-27

## 2022-01-13 RX ORDER — CODEINE PHOSPHATE AND GUAIFENESIN 10; 100 MG/5ML; MG/5ML
SOLUTION ORAL
COMMUNITY
End: 2022-01-27

## 2022-01-13 RX ORDER — TAMSULOSIN HYDROCHLORIDE 0.4 MG/1
CAPSULE ORAL
COMMUNITY
End: 2022-01-27

## 2022-01-13 RX ORDER — LANOLIN ALCOHOL/MO/W.PET/CERES
500 CREAM (GRAM) TOPICAL DAILY
COMMUNITY

## 2022-01-13 RX ORDER — LEUPROLIDE ACETATE 45 MG
KIT SUBCUTANEOUS
COMMUNITY
End: 2022-01-27

## 2022-01-13 RX ORDER — SIMVASTATIN 20 MG/1
TABLET, FILM COATED ORAL
COMMUNITY
End: 2022-01-27

## 2022-01-13 RX ORDER — AMILORIDE HYDROCHLORIDE AND HYDROCHLOROTHIAZIDE 5; 50 MG/1; MG/1
TABLET ORAL
COMMUNITY
End: 2022-01-27

## 2022-01-13 RX ORDER — INSULIN DETEMIR 100 [IU]/ML
INJECTION, SOLUTION SUBCUTANEOUS
COMMUNITY
End: 2022-01-27

## 2022-01-13 RX ORDER — FLUTICASONE PROPIONATE 50 MCG
SPRAY, SUSPENSION (ML) NASAL
COMMUNITY
End: 2022-01-27

## 2022-01-13 RX ORDER — HYDROCODONE BITARTRATE AND ACETAMINOPHEN 7.5; 325 MG/1; MG/1
TABLET ORAL
COMMUNITY
End: 2022-01-27

## 2022-01-13 RX ORDER — OFLOXACIN 3 MG/ML
SOLUTION/ DROPS OPHTHALMIC
COMMUNITY
End: 2022-01-27

## 2022-01-13 RX ORDER — METFORMIN HYDROCHLORIDE 1000 MG/1
TABLET ORAL
COMMUNITY
End: 2022-01-27

## 2022-01-13 RX ORDER — GENTAMICIN SULFATE 40 MG/ML
INJECTION, SOLUTION INTRAMUSCULAR; INTRAVENOUS
COMMUNITY
End: 2022-01-27

## 2022-01-13 RX ORDER — TIZANIDINE 4 MG/1
TABLET ORAL
COMMUNITY
End: 2022-01-27

## 2022-01-13 RX ORDER — ZOLPIDEM TARTRATE 10 MG/1
TABLET ORAL
COMMUNITY
End: 2022-01-27

## 2022-01-13 RX ORDER — CLOTRIMAZOLE AND BETAMETHASONE DIPROPIONATE 10; .64 MG/G; MG/G
CREAM TOPICAL
COMMUNITY
End: 2022-01-27

## 2022-01-13 RX ORDER — DORZOLAMIDE HYDROCHLORIDE AND TIMOLOL MALEATE 20; 5 MG/ML; MG/ML
SOLUTION/ DROPS OPHTHALMIC
COMMUNITY
End: 2022-01-27

## 2022-01-13 RX ORDER — VARDENAFIL HYDROCHLORIDE 20 MG/1
TABLET ORAL
COMMUNITY
End: 2022-01-27

## 2022-01-13 RX ORDER — PANTOPRAZOLE SODIUM 40 MG/1
TABLET, DELAYED RELEASE ORAL
COMMUNITY
End: 2022-01-27

## 2022-01-13 RX ORDER — BRIMONIDINE TARTRATE 2 MG/ML
SOLUTION/ DROPS OPHTHALMIC
COMMUNITY
End: 2022-01-27

## 2022-01-13 RX ORDER — TRAZODONE HYDROCHLORIDE 50 MG/1
TABLET ORAL
COMMUNITY
Start: 2021-05-25 | End: 2022-01-27

## 2022-01-13 RX ORDER — CEFDINIR 300 MG/1
CAPSULE ORAL
COMMUNITY
End: 2022-01-27

## 2022-01-13 NOTE — PROGRESS NOTES
"Pt is a 70 yo WM here for diarrhea x3 months. Pt states he has episodes every 4-5 days that last about 1-2 days. Denies hematochezia, melena, abd pain/cramping. Has tried to eliminate certain foods from diet - helped only slightly. Was taken off Metformin. On Trulicity -started 1 year ago, increased 2 months ago.   Still has GB. Also has sour belches. Noted Protonix 40 mg daily on med list. No h/o HP.  Last colonoscopy 2014 in [facility] - diverticulosis, hemorrhoids, no polyps - Strong family history of colon CA: paternal grandfather (dx in his 70s), father (dx in his 60s), and older brother (dx in his 60s).  H/o elevated ALP at PCP a few months ago but normal liver u/s & LFTs all normal at f/u check a couple of months later.     Past Medical History:   Diagnosis Date    Carcinoma of prostate     Hypertension     Type 2 diabetes mellitus     Vitamin B 12 deficiency(non anemic)    S/p right nephrectomy 2/2 of renal cell carcinoma    Review of Systems   Constitutional: Negative for chills and fever.   Respiratory: Negative for shortness of breath.    Cardiovascular: Negative for chest pain.   Gastrointestinal: Positive for diarrhea. Negative for abdominal pain, blood in stool, constipation, melena, nausea and vomiting.   Skin: Negative for rash.   Neurological: Negative for weakness.     Physical Exam  Vitals reviewed. Exam conducted with a chaperone present.   Constitutional:       Appearance: Normal appearance.   HENT:      Head: Normocephalic.   Eyes:      General: No scleral icterus.     Pupils: Pupils are equal, round, and reactive to light.   Cardiovascular:      Rate and Rhythm: Normal rate.      Pulses: Normal pulses.   Pulmonary:      Effort: Pulmonary effort is normal.   Abdominal:      General: Abdomen is flat. There is no distension.      Palpations: Abdomen is soft.      Tenderness: There is no abdominal tenderness. There is no guarding or rebound.   Musculoskeletal:         General: No " swelling.   Skin:     General: Skin is warm and dry.      Coloration: Skin is not jaundiced.   Neurological:      General: No focal deficit present.      Mental Status: He is alert and oriented to person, place, and time.   Psychiatric:         Mood and Affect: Mood normal.         Behavior: Behavior normal.         Thought Content: Thought content normal.         Judgment: Judgment normal.       Plan  -CBC, CMP  -stool studies, TSH, T4, tTG, ESR, CRP, calprotectin  -colonoscopy & EGD for chronic diarrhea (h/o GILBERT - do separate days)  -GB u/s & HIDA scan at f/u if no better  -f/u PCP for DM med management - diarrhea may be 2/2 to SE of meds  -Imodium PRN as directed on box for now  -RTC 6-8 weeks, sooner PRN

## 2022-01-13 NOTE — TELEPHONE ENCOUNTER
----- Message from ISADORA Rose sent at 1/13/2022 12:44 PM CST -----  Glucose and BUN/creatinine are elevated. Needs to f/u PCP. Inflammatory markers & thyroid WNL.

## 2022-01-13 NOTE — H&P (VIEW-ONLY)
"Pt is a 72 yo WM here for diarrhea x3 months. Pt states he has episodes every 4-5 days that last about 1-2 days. Denies hematochezia, melena, abd pain/cramping. Has tried to eliminate certain foods from diet - helped only slightly. Was taken off Metformin. On Trulicity -started 1 year ago, increased 2 months ago.   Still has GB. Also has sour belches. Noted Protonix 40 mg daily on med list. No h/o HP.  Last colonoscopy 2014 in [facility] - diverticulosis, hemorrhoids, no polyps - Strong family history of colon CA: paternal grandfather (dx in his 70s), father (dx in his 60s), and older brother (dx in his 60s).  H/o elevated ALP at PCP a few months ago but normal liver u/s & LFTs all normal at f/u check a couple of months later.     Past Medical History:   Diagnosis Date    Carcinoma of prostate     Hypertension     Type 2 diabetes mellitus     Vitamin B 12 deficiency(non anemic)    S/p right nephrectomy 2/2 of renal cell carcinoma    Review of Systems   Constitutional: Negative for chills and fever.   Respiratory: Negative for shortness of breath.    Cardiovascular: Negative for chest pain.   Gastrointestinal: Positive for diarrhea. Negative for abdominal pain, blood in stool, constipation, melena, nausea and vomiting.   Skin: Negative for rash.   Neurological: Negative for weakness.     Physical Exam  Vitals reviewed. Exam conducted with a chaperone present.   Constitutional:       Appearance: Normal appearance.   HENT:      Head: Normocephalic.   Eyes:      General: No scleral icterus.     Pupils: Pupils are equal, round, and reactive to light.   Cardiovascular:      Rate and Rhythm: Normal rate.      Pulses: Normal pulses.   Pulmonary:      Effort: Pulmonary effort is normal.   Abdominal:      General: Abdomen is flat. There is no distension.      Palpations: Abdomen is soft.      Tenderness: There is no abdominal tenderness. There is no guarding or rebound.   Musculoskeletal:         General: No " swelling.   Skin:     General: Skin is warm and dry.      Coloration: Skin is not jaundiced.   Neurological:      General: No focal deficit present.      Mental Status: He is alert and oriented to person, place, and time.   Psychiatric:         Mood and Affect: Mood normal.         Behavior: Behavior normal.         Thought Content: Thought content normal.         Judgment: Judgment normal.       Plan  -CBC, CMP  -stool studies, TSH, T4, tTG, ESR, CRP, calprotectin  -colonoscopy & EGD for chronic diarrhea (h/o GILBERT - do separate days)  -GB u/s & HIDA scan at f/u if no better  -f/u PCP for DM med management - diarrhea may be 2/2 to SE of meds  -Imodium PRN as directed on box for now  -RTC 6-8 weeks, sooner PRN

## 2022-01-14 LAB
C COLI+JEJ+UPSA DNA STL QL NAA+NON-PROBE: NEGATIVE
E COLI SXT1 STL QL IA: NEGATIVE
E COLI SXT2 STL QL IA: NEGATIVE
NOROVIRUS GI+II RNA STL QL NAA+NON-PROBE: NEGATIVE
RVA RNA STL QL NAA+NON-PROBE: NEGATIVE
S ENT+BONG DNA STL QL NAA+NON-PROBE: NEGATIVE
SHIGELLA SPECIES NAT: NEGATIVE
V CHOL+PARA+VUL DNA STL QL NAA+NON-PROBE: NEGATIVE
Y ENTEROCOL DNA STL QL NAA+NON-PROBE: NEGATIVE

## 2022-01-14 PROCEDURE — 87506 IADNA-DNA/RNA PROBE TQ 6-11: CPT | Mod: ,,, | Performed by: CLINICAL MEDICAL LABORATORY

## 2022-01-14 PROCEDURE — 87493 C DIFF TOXIN BY PCR: ICD-10-PCS | Mod: 59,,, | Performed by: CLINICAL MEDICAL LABORATORY

## 2022-01-14 PROCEDURE — 87506 ENTERIC PATHOGEN PANEL: ICD-10-PCS | Mod: ,,, | Performed by: CLINICAL MEDICAL LABORATORY

## 2022-01-14 PROCEDURE — 87209 SMEAR COMPLEX STAIN: CPT | Mod: ,,, | Performed by: CLINICAL MEDICAL LABORATORY

## 2022-01-14 PROCEDURE — 87493 C DIFF AMPLIFIED PROBE: CPT | Mod: 59,,, | Performed by: CLINICAL MEDICAL LABORATORY

## 2022-01-14 PROCEDURE — 87177 OVA AND PARASITE EXAM: ICD-10-PCS | Mod: ,,, | Performed by: CLINICAL MEDICAL LABORATORY

## 2022-01-14 PROCEDURE — 87177 OVA AND PARASITES SMEARS: CPT | Mod: ,,, | Performed by: CLINICAL MEDICAL LABORATORY

## 2022-01-14 PROCEDURE — 87209 OVA AND PARASITE EXAM: ICD-10-PCS | Mod: ,,, | Performed by: CLINICAL MEDICAL LABORATORY

## 2022-01-15 LAB — C DIFF TOX A+B STL IA-ACNC: NEGATIVE

## 2022-01-17 ENCOUNTER — TELEPHONE (OUTPATIENT)
Dept: GASTROENTEROLOGY | Facility: CLINIC | Age: 72
End: 2022-01-17
Payer: MEDICARE

## 2022-01-17 DIAGNOSIS — Z11.52 ENCOUNTER FOR SCREENING FOR SEVERE ACUTE RESPIRATORY SYNDROME CORONAVIRUS 2 (SARS-COV-2) INFECTION: Primary | ICD-10-CM

## 2022-01-17 NOTE — TELEPHONE ENCOUNTER
"----- Message from ISADORA Rose sent at 1/17/2022  8:23 AM CST -----  Regarding: FW: Cancellation of Order # [id number]  Insufficient stool quantity for calprotectin. Needs to redo.      ----- Message -----  From: Tierney Segundo MLT  Sent: 1/14/2022   2:06 PM CST  To: ISADORA Rose  Subject: Cancellation of Order # [id number]                Order number [id number] for the procedure CALPROTECTIN, STOOL   [[id number]] has been canceled by Tierney Segundo MLT [[id number]].   This procedure was ordered by ISADORA Rose [[id number]] on Jan 13, 2022 for the patient Davon Barrios [[id number]]. The reason   for cancellation was "Quantity Not Sufficient".    "

## 2022-01-19 ENCOUNTER — HOSPITAL ENCOUNTER (OUTPATIENT)
Dept: GASTROENTEROLOGY | Facility: HOSPITAL | Age: 72
Discharge: HOME | End: 2022-01-19
Attending: NURSE PRACTITIONER
Payer: MEDICARE

## 2022-01-19 ENCOUNTER — ANESTHESIA (OUTPATIENT)
Dept: GASTROENTEROLOGY | Facility: HOSPITAL | Age: 72
End: 2022-01-19
Payer: MEDICARE

## 2022-01-19 ENCOUNTER — ANESTHESIA EVENT (OUTPATIENT)
Dept: GASTROENTEROLOGY | Facility: HOSPITAL | Age: 72
End: 2022-01-19
Payer: MEDICARE

## 2022-01-19 VITALS
BODY MASS INDEX: 35.21 KG/M2 | SYSTOLIC BLOOD PRESSURE: 129 MMHG | WEIGHT: 260 LBS | HEART RATE: 94 BPM | DIASTOLIC BLOOD PRESSURE: 71 MMHG | TEMPERATURE: 97 F | HEIGHT: 72 IN | RESPIRATION RATE: 11 BRPM | OXYGEN SATURATION: 99 %

## 2022-01-19 DIAGNOSIS — R19.7 DIARRHEA, UNSPECIFIED TYPE: ICD-10-CM

## 2022-01-19 LAB
GLUCOSE SERPL-MCNC: 107 MG/DL (ref 70–110)
O+P STL CONC: NORMAL
TRICHROME SMEAR: NORMAL

## 2022-01-19 PROCEDURE — C1889 IMPLANT/INSERT DEVICE, NOC: HCPCS

## 2022-01-19 PROCEDURE — 88342 SURGICAL PATHOLOGY: ICD-10-PCS | Mod: 26,,, | Performed by: PATHOLOGY

## 2022-01-19 PROCEDURE — 82962 GLUCOSE BLOOD TEST: CPT

## 2022-01-19 PROCEDURE — 88305 SURGICAL PATHOLOGY: ICD-10-PCS | Mod: 26,,, | Performed by: PATHOLOGY

## 2022-01-19 PROCEDURE — 88305 TISSUE EXAM BY PATHOLOGIST: CPT | Mod: SUR | Performed by: STUDENT IN AN ORGANIZED HEALTH CARE EDUCATION/TRAINING PROGRAM

## 2022-01-19 PROCEDURE — 45385 COLONOSCOPY W/LESION REMOVAL: CPT | Mod: ,,, | Performed by: STUDENT IN AN ORGANIZED HEALTH CARE EDUCATION/TRAINING PROGRAM

## 2022-01-19 PROCEDURE — D9220A PRA ANESTHESIA: Mod: ,,, | Performed by: NURSE ANESTHETIST, CERTIFIED REGISTERED

## 2022-01-19 PROCEDURE — 45385 PR COLONOSCOPY,REMV LESN,SNARE: ICD-10-PCS | Mod: ,,, | Performed by: STUDENT IN AN ORGANIZED HEALTH CARE EDUCATION/TRAINING PROGRAM

## 2022-01-19 PROCEDURE — 37000009 HC ANESTHESIA EA ADD 15 MINS

## 2022-01-19 PROCEDURE — 37000008 HC ANESTHESIA 1ST 15 MINUTES

## 2022-01-19 PROCEDURE — D9220A PRA ANESTHESIA: ICD-10-PCS | Mod: ,,, | Performed by: NURSE ANESTHETIST, CERTIFIED REGISTERED

## 2022-01-19 PROCEDURE — 88305 TISSUE EXAM BY PATHOLOGIST: CPT | Mod: 26,,, | Performed by: PATHOLOGY

## 2022-01-19 PROCEDURE — 45380 COLONOSCOPY AND BIOPSY: CPT

## 2022-01-19 PROCEDURE — 27000716 HC OXISENSOR PROBE, ANY SIZE: Performed by: NURSE ANESTHETIST, CERTIFIED REGISTERED

## 2022-01-19 PROCEDURE — 45380 COLONOSCOPY AND BIOPSY: CPT | Mod: 59,,, | Performed by: STUDENT IN AN ORGANIZED HEALTH CARE EDUCATION/TRAINING PROGRAM

## 2022-01-19 PROCEDURE — 27201423 OPTIME MED/SURG SUP & DEVICES STERILE SUPPLY

## 2022-01-19 PROCEDURE — 63600175 PHARM REV CODE 636 W HCPCS: Performed by: NURSE ANESTHETIST, CERTIFIED REGISTERED

## 2022-01-19 PROCEDURE — 27000284 HC CANNULA NASAL: Performed by: NURSE ANESTHETIST, CERTIFIED REGISTERED

## 2022-01-19 PROCEDURE — 25000003 PHARM REV CODE 250: Performed by: NURSE ANESTHETIST, CERTIFIED REGISTERED

## 2022-01-19 PROCEDURE — 88342 IMHCHEM/IMCYTCHM 1ST ANTB: CPT | Mod: 26,,, | Performed by: PATHOLOGY

## 2022-01-19 PROCEDURE — 45380 PR COLONOSCOPY,BIOPSY: ICD-10-PCS | Mod: 59,,, | Performed by: STUDENT IN AN ORGANIZED HEALTH CARE EDUCATION/TRAINING PROGRAM

## 2022-01-19 PROCEDURE — 45385 COLONOSCOPY W/LESION REMOVAL: CPT

## 2022-01-19 RX ORDER — LIDOCAINE HYDROCHLORIDE 20 MG/ML
INJECTION, SOLUTION EPIDURAL; INFILTRATION; INTRACAUDAL; PERINEURAL
Status: DISCONTINUED | OUTPATIENT
Start: 2022-01-19 | End: 2022-01-19

## 2022-01-19 RX ORDER — SODIUM CHLORIDE 0.9 % (FLUSH) 0.9 %
10 SYRINGE (ML) INJECTION
Status: DISCONTINUED | OUTPATIENT
Start: 2022-01-19 | End: 2022-01-20 | Stop reason: HOSPADM

## 2022-01-19 RX ORDER — PROPOFOL 10 MG/ML
VIAL (ML) INTRAVENOUS
Status: DISCONTINUED | OUTPATIENT
Start: 2022-01-19 | End: 2022-01-19

## 2022-01-19 RX ORDER — SODIUM CHLORIDE 9 MG/ML
INJECTION, SOLUTION INTRAVENOUS CONTINUOUS
Status: DISCONTINUED | OUTPATIENT
Start: 2022-01-19 | End: 2022-01-20 | Stop reason: HOSPADM

## 2022-01-19 RX ADMIN — PROPOFOL 30 MG: 10 INJECTION, EMULSION INTRAVENOUS at 08:01

## 2022-01-19 RX ADMIN — PROPOFOL 50 MG: 10 INJECTION, EMULSION INTRAVENOUS at 08:01

## 2022-01-19 RX ADMIN — PROPOFOL 40 MG: 10 INJECTION, EMULSION INTRAVENOUS at 08:01

## 2022-01-19 RX ADMIN — SODIUM CHLORIDE: 9 INJECTION, SOLUTION INTRAVENOUS at 08:01

## 2022-01-19 RX ADMIN — PROPOFOL 20 MG: 10 INJECTION, EMULSION INTRAVENOUS at 08:01

## 2022-01-19 RX ADMIN — LIDOCAINE HYDROCHLORIDE 70 MG: 20 INJECTION, SOLUTION INTRAVENOUS at 08:01

## 2022-01-19 NOTE — DISCHARGE INSTRUCTIONS
Procedure Date  1/19/22     Impression  Overall Impression: Fair prep. Mild diverticulosis. 2 small polyps removed. Random TI, ascending and descending colon biopsies obtained. Suspect overflow diarrhea based on difficulty prepping.     Recommendation  Await pathology results  Repeat colonoscopy in 5 years        Indication  Diarrhea, unspecified type

## 2022-01-19 NOTE — TRANSFER OF CARE
Anesthesia Transfer of Care Note    Patient: Davon Barrios    Procedure(s) Performed: * No procedures listed *    Patient location: GI    Anesthesia Type: general    Transport from OR: Transported from OR on room air with adequate spontaneous ventilation. Continuous ECG monitoring in transport. Continuous SpO2 monitoring in transport    Post pain: adequate analgesia    Post assessment: no apparent anesthetic complications    Post vital signs: stable    Level of consciousness: sedated and responds to stimulation    Nausea/Vomiting: no nausea/vomiting    Complications: none    Transfer of care protocol was followedComments: Good SV continue, NAD, VSS, RTRN      Last vitals:   Visit Vitals  BP 94/60 (BP Location: Left arm, Patient Position: Lying)   Pulse 106   Temp 36.1 °C (97 °F) (Skin)   Resp 14   Ht 6' (1.829 m)   Wt 117.9 kg (260 lb)   SpO2 97%   BMI 35.26 kg/m²

## 2022-01-19 NOTE — ANESTHESIA POSTPROCEDURE EVALUATION
Anesthesia Post Evaluation    Patient: Davon Barrios    Procedure(s) Performed: * No procedures listed *    Final Anesthesia Type: general      Patient location during evaluation: GI PACU  Patient participation: Yes- Able to Participate  Level of consciousness: awake and alert  Post-procedure vital signs: reviewed and stable  Pain management: adequate  Airway patency: patent    PONV status at discharge: No PONV  Anesthetic complications: no      Cardiovascular status: blood pressure returned to baseline and hemodynamically stable  Respiratory status: spontaneous ventilation  Hydration status: euvolemic  Follow-up not needed.  Comments: Pt voices appreciation for care          Vitals Value Taken Time   /73 01/19/22 0926   Temp 36.1 °C (97 °F) 01/19/22 0852   Pulse 79 01/19/22 0930   Resp 8 01/19/22 0930   SpO2 97 % 01/19/22 0928   Vitals shown include unvalidated device data.      No case tracking events are documented in the log.      Pain/Jarred Score: Jarred Score: 8 (1/19/2022  9:07 AM)

## 2022-01-19 NOTE — ANESTHESIA PREPROCEDURE EVALUATION
01/19/2022  Davon Barrios is a 71 y.o., male.  Past Medical History:   Diagnosis Date    Carcinoma of prostate     Hypertension     Type 2 diabetes mellitus     Vitamin B 12 deficiency(non anemic)        No past surgical history on file.    Family History   Problem Relation Age of Onset    Diabetes Mother     Rectal cancer Father     Diabetes Father     Hypertension Father     Lung cancer Father     Hypertension Sister     Hypertension Brother        Social History     Socioeconomic History    Marital status:    Tobacco Use    Smoking status: Never Smoker    Smokeless tobacco: Never Used   Substance and Sexual Activity    Alcohol use: Never    Drug use: Never       Current Outpatient Medications   Medication Sig Dispense Refill    ADULT LOW DOSE ASPIRIN ORAL Take 81 mg by mouth.      alendronate (FOSAMAX) 70 MG tablet alendronate 70 mg tablet      amiloride-hydrochlorothiazide 5-50mg (MODURETIC 5-50) 5-50 mg Tab amiloride 5 mg-hydrochlorothiazide 50 mg tablet      ascorbic Acid (VITAMIN C) 500 mg CpSR       atorvastatin (LIPITOR) 40 MG tablet Take 40 mg by mouth once daily.      brimonidine 0.2% (ALPHAGAN) 0.2 % Drop brimonidine 0.2 % eye drops      carvediloL (COREG) 3.125 MG tablet       cefdinir (OMNICEF) 300 MG capsule cefdinir 300 mg capsule      citalopram (CELEXA) 10 MG tablet       clotrimazole-betamethasone 1-0.05% (LOTRISONE) cream clotrimazole-betamethasone 1 %-0.05 % topical cream      cyclobenzaprine (FLEXERIL) 10 MG tablet Take 1 tablet (10 mg total) by mouth 2 (two) times a day. 180 tablet 1    dorzolamide-timolol 2-0.5% (COSOPT) 22.3-6.8 mg/mL ophthalmic solution dorzolamide 22.3 mg-timolol 6.8 mg/mL eye drops      dulaglutide (TRULICITY) 3 mg/0.5 mL pen injector Inject 3 mg into the skin every 7 days. 4 pen 5    enalapril (VASOTEC) 20 MG tablet Take 1  "tablet (20 mg total) by mouth once daily. 90 tablet 0    enalapriL-hydrochlorothiazide (VASERETIC) 10-25 mg per tablet Take by mouth.      ergocalciferol (ERGOCALCIFEROL) 50,000 unit Cap 1 capsule      fluticasone propionate (FLONASE) 50 mcg/actuation nasal spray fluticasone propionate 50 mcg/actuation nasal spray,suspension      folic acid (FOLVITE) 1 MG tablet Take 1 tablet (1 mg total) by mouth once daily. 90 tablet 1    furosemide (LASIX) 20 MG tablet 1 tablet      gabapentin (NEURONTIN) 300 MG capsule       gentamicin (GARAMYCIN) 40 mg/mL injection gentamicin 40 mg/mL injection solution   80 mg/2 ml intramuscular      guaiFENesin-codeine 100-10 mg/5 ml (TUSSI-ORGANIDIN NR)  mg/5 mL syrup Cheratussin AC 10 mg-100 mg/5 mL oral liquid      HYDROcodone-acetaminophen (NORCO) 7.5-325 mg per tablet hydrocodone 7.5 mg-acetaminophen 325 mg tablet      insulin aspart prot/insuln asp (NOVOLOG MIX 70-30 U-100 INSULN SUBQ) Inject subcutaneously 50 units every morning and 50 units every night      insulin detemir U-100 (LEVEMIR U-100 INSULIN) 100 unit/mL injection Levemir U-100 Insulin 100 unit/mL subcutaneous solution      insulin glargine (LANTUS U-100 INSULIN) 100 unit/mL injection Lantus U-100 Insulin 100 unit/mL subcutaneous solution      insulin lispro protamine-insulin lispro (HUMALOG MIX 75-25,U-100,INSULN) 100 unit/mL (75-25) Susp Humalog Mix 75-25 (U-100) Insulin 100 unit/mL subcutaneous suspension      insulin NPH-insulin regular, 70/30, (NOVOLIN 70/30) 100 unit/mL (70-30) injection 60units am 45units hs      insulin syringe-needle U-100 (BD INSULIN SYRINGE) 1 mL 25 gauge x 5/8" Syrg BD Insulin Syringe 1 mL 25 gauge x 5/8"      latanoprost 0.005 % ophthalmic solution latanoprost 0.005 % eye drops      leuprolide, 6 month, (ELIGARD, 6 MONTH,) 45 mg injection Eligard 45 mg (6 month) subcutaneous syringe      levETIRAcetam (KEPPRA) 500 MG Tab levetiracetam 500 mg tablet      meloxicam (MOBIC) " 7.5 MG tablet meloxicam 7.5 mg tablet      metFORMIN (GLUCOPHAGE) 1000 MG tablet metformin 1,000 mg tablet      nwtczh-rxyxq-u.blue-sal-naphos 120-0.12 mg Cap MARÍA ELENA (with phenyl salicylate) 120 mg-0.12 mg capsule      moxifloxacin (VIGAMOX) 0.5 % ophthalmic solution 1 drop 3 (three) times daily.      mupirocin (BACTROBAN) 2 % ointment mupirocin 2 % topical ointment      neomycin-polymyxin-hydrocortisone (CORTISPORIN) 3.5-10,000-1 mg/mL-unit/mL-% otic suspension neomycin-polymyxin-hydrocort 3.5 mg-10,000 unit/mL-1 % ear drops,susp      ofloxacin (OCUFLOX) 0.3 % ophthalmic solution ofloxacin 0.3 % eye drops      oxyCODONE-acetaminophen (PERCOCET) 7.5-325 mg per tablet oxycodone-acetaminophen 7.5 mg-325 mg tablet      pantoprazole (PROTONIX) 40 MG tablet pantoprazole 40 mg tablet,delayed release      pioglitazone HCl (ACTOS ORAL) Actos      quinapriL (ACCUPRIL) 20 MG tablet Take by mouth.      simvastatin (ZOCOR) 20 MG tablet simvastatin 20 mg tablet      tamsulosin (FLOMAX) 0.4 mg Cap tamsulosin 0.4 mg capsule      tiZANidine (ZANAFLEX) 4 MG tablet tizanidine 4 mg tablet      traZODone (DESYREL) 50 MG tablet trazodone 50 mg tablet      vardenafiL (LEVITRA) 20 MG tablet Levitra 20 mg tablet      vitamin E 100 UNIT capsule       vitamin E 1000 UNIT capsule Take 1,000 Units by mouth once daily.      zolpidem (AMBIEN) 10 mg Tab zolpidem 10 mg tablet       No current facility-administered medications for this encounter.       Review of patient's allergies indicates:  No Known Allergies  Anesthesia Evaluation    I have reviewed the Patient Summary Reports.    I have reviewed the Nursing Notes. I have reviewed the NPO Status.   I have reviewed the Medications.     Review of Systems  Anesthesia Hx:  No problems with previous Anesthesia  Denies Family Hx of Anesthesia complications.   Denies Personal Hx of Anesthesia complications.   Hematology/Oncology:     Oncology Normal     EENT/Dental:EENT/Dental Normal    Cardiovascular:   Hypertension hyperlipidemia    Renal/:  Renal/ Normal     Musculoskeletal:  Musculoskeletal Normal    Neurological:  Neurology Normal    Endocrine:   Diabetes, type 2    Dermatological:  Skin Normal    Psych:  Psychiatric Normal           Physical Exam  General:  Well nourished, Obesity       Chest/Lungs:  Chest/Lungs Findings: Normal Respiratory Rate     Heart/Vascular:  Heart Findings: Rate: Normal             Anesthesia Plan  Type of Anesthesia, risks & benefits discussed:  Anesthesia Type:  MAC, general    Patient's Preference:   Plan Factors:          Intra-op Monitoring Plan: standard ASA monitors  Intra-op Monitoring Plan Comments:   Post Op Pain Control Plan: per primary service following discharge from PACU  Post Op Pain Control Plan Comments:     Induction:   IV  Beta Blocker:  Patient is not currently on a Beta-Blocker (No further documentation required).       Informed Consent: Patient understands risks and agrees with Anesthesia plan.  Questions answered. Anesthesia consent signed with patient.  ASA Score: 3     Day of Surgery Review of History & Physical: I have interviewed and examined the patient. I have reviewed the patient's H&P dated:  There are no significant changes.          Ready For Surgery From Anesthesia Perspective.

## 2022-01-19 NOTE — INTERVAL H&P NOTE
The patient has been examined and the H&P has been reviewed:    I concur with the findings and no changes have occurred since H&P was written.  EGD and colonoscopy for diarrhea. Normal colonoscopy in 2014.      There are no hospital problems to display for this patient.

## 2022-01-20 ENCOUNTER — TELEPHONE (OUTPATIENT)
Dept: GASTROENTEROLOGY | Facility: CLINIC | Age: 72
End: 2022-01-20
Payer: MEDICARE

## 2022-01-20 LAB
DHEA SERPL-MCNC: NORMAL
ESTROGEN SERPL-MCNC: NORMAL PG/ML
LAB AP GROSS DESCRIPTION: NORMAL
LAB AP LABORATORY NOTES: NORMAL
T3RU NFR SERPL: NORMAL %

## 2022-01-20 NOTE — TELEPHONE ENCOUNTER
pts wife called and states pt wants to hold off on upper scope to see if diarrhea is coming from constiaption, will try miralax every day to try and control this. Will re-visit this up at f/u apt.

## 2022-01-25 RX ORDER — ENALAPRIL MALEATE AND HYDROCHLOROTHIAZIDE 10; 25 MG/1; MG/1
1 TABLET ORAL DAILY
Qty: 90 TABLET | Refills: 1 | OUTPATIENT
Start: 2022-01-25

## 2022-01-25 NOTE — TELEPHONE ENCOUNTER
----- Message from Leticia Rosas sent at 1/25/2022  8:31 AM CST -----  Regarding: enalapril (VASOTEC) 20 MG tablet  Contact: spouse-Shaneka  Pt needs:  enalapril (VASOTEC) 20 MG tablet---90 day supply    Pharmacy:[facility] [place] - [address] [address]  [address] [place] [address]  Phone: [phone] Fax: [phone]      Phone #:  [phone]

## 2022-01-26 DIAGNOSIS — I10 HYPERTENSION, UNSPECIFIED TYPE: ICD-10-CM

## 2022-01-27 RX ORDER — ENALAPRIL MALEATE 20 MG/1
20 TABLET ORAL DAILY
Qty: 90 TABLET | Refills: 0 | Status: SHIPPED | OUTPATIENT
Start: 2022-01-27 | End: 2022-05-17

## 2022-02-01 ENCOUNTER — TELEPHONE (OUTPATIENT)
Dept: GASTROENTEROLOGY | Facility: CLINIC | Age: 72
End: 2022-02-01
Payer: MEDICARE

## 2022-02-01 NOTE — TELEPHONE ENCOUNTER
Pts wife states she is going to talk to PCP that has pt on trulicity and see if he can lower dosage like before to see if that would subside diarrhea. Pt states she will call back to update.

## 2022-02-01 NOTE — TELEPHONE ENCOUNTER
pts wife calls and states  has had diarrhea since Sunday. Pt belching all day. Pt has been taking miralax daily but stopped taking it recently due to diarrhea.

## 2022-02-02 ENCOUNTER — TELEPHONE (OUTPATIENT)
Dept: FAMILY MEDICINE | Facility: CLINIC | Age: 72
End: 2022-02-02
Payer: MEDICARE

## 2022-02-02 NOTE — TELEPHONE ENCOUNTER
----- Message from Miriam Alarcon sent at 2/1/2022  1:46 PM CST -----  Regarding: call  Patient's wife would like to speak to a nurse today , would Not say why?   [phone]  Thank you

## 2022-02-03 ENCOUNTER — TELEPHONE (OUTPATIENT)
Dept: GASTROENTEROLOGY | Facility: CLINIC | Age: 72
End: 2022-02-03
Payer: MEDICARE

## 2022-02-03 NOTE — TELEPHONE ENCOUNTER
Wife calls and states stool studies were turned in this morning. I told her we would call as soon as results come back, she voiced understanding.

## 2022-02-08 ENCOUNTER — TELEPHONE (OUTPATIENT)
Dept: GASTROENTEROLOGY | Facility: CLINIC | Age: 72
End: 2022-02-08
Payer: MEDICARE

## 2022-02-08 NOTE — TELEPHONE ENCOUNTER
pts wife called and states pt has been in bed all day with diarhhea which stopped around 10 am. Pt has also been having hallucinations and weakness/fatigue. Denies fever/chills. pts appt was moved from [id number] since pt could not get covid test today due to being so weak, will get it first thing in the am. Pt was encouraged to go to er if showing severe signs of dehydration and hallucinations. If feeling better, hydrate tonight and plan on egd in morning. Wife voiced understanding.

## 2022-02-09 ENCOUNTER — ANESTHESIA EVENT (OUTPATIENT)
Dept: GASTROENTEROLOGY | Facility: HOSPITAL | Age: 72
End: 2022-02-09
Payer: MEDICARE

## 2022-02-09 ENCOUNTER — HOSPITAL ENCOUNTER (OUTPATIENT)
Dept: GASTROENTEROLOGY | Facility: HOSPITAL | Age: 72
Discharge: HOME | End: 2022-02-09
Attending: NURSE PRACTITIONER
Payer: MEDICARE

## 2022-02-09 ENCOUNTER — ANESTHESIA (OUTPATIENT)
Dept: GASTROENTEROLOGY | Facility: HOSPITAL | Age: 72
End: 2022-02-09
Payer: MEDICARE

## 2022-02-09 ENCOUNTER — TELEPHONE (OUTPATIENT)
Dept: FAMILY MEDICINE | Facility: CLINIC | Age: 72
End: 2022-02-09
Payer: MEDICARE

## 2022-02-09 VITALS
TEMPERATURE: 98 F | BODY MASS INDEX: 35.21 KG/M2 | SYSTOLIC BLOOD PRESSURE: 161 MMHG | HEART RATE: 60 BPM | DIASTOLIC BLOOD PRESSURE: 79 MMHG | OXYGEN SATURATION: 100 % | RESPIRATION RATE: 16 BRPM | HEIGHT: 72 IN | WEIGHT: 260 LBS

## 2022-02-09 DIAGNOSIS — R19.7 DIARRHEA, UNSPECIFIED TYPE: ICD-10-CM

## 2022-02-09 DIAGNOSIS — Z11.52 ENCOUNTER FOR SCREENING FOR SEVERE ACUTE RESPIRATORY SYNDROME CORONAVIRUS 2 (SARS-COV-2) INFECTION: Primary | ICD-10-CM

## 2022-02-09 LAB
GLUCOSE SERPL-MCNC: 195 MG/DL (ref 70–105)
GLUCOSE SERPL-MCNC: 195 MG/DL (ref 70–110)

## 2022-02-09 PROCEDURE — 27000284 HC CANNULA NASAL: Performed by: NURSE ANESTHETIST, CERTIFIED REGISTERED

## 2022-02-09 PROCEDURE — C1889 IMPLANT/INSERT DEVICE, NOC: HCPCS

## 2022-02-09 PROCEDURE — 37000008 HC ANESTHESIA 1ST 15 MINUTES

## 2022-02-09 PROCEDURE — D9220A PRA ANESTHESIA: Mod: ,,, | Performed by: NURSE ANESTHETIST, CERTIFIED REGISTERED

## 2022-02-09 PROCEDURE — 88305 TISSUE EXAM BY PATHOLOGIST: CPT | Mod: SUR | Performed by: STUDENT IN AN ORGANIZED HEALTH CARE EDUCATION/TRAINING PROGRAM

## 2022-02-09 PROCEDURE — 27201423 OPTIME MED/SURG SUP & DEVICES STERILE SUPPLY

## 2022-02-09 PROCEDURE — 88305 TISSUE EXAM BY PATHOLOGIST: CPT | Mod: 26,XU,, | Performed by: PATHOLOGY

## 2022-02-09 PROCEDURE — 43239 PR EGD, FLEX, W/BIOPSY, SGL/MULTI: ICD-10-PCS | Mod: ,,, | Performed by: STUDENT IN AN ORGANIZED HEALTH CARE EDUCATION/TRAINING PROGRAM

## 2022-02-09 PROCEDURE — D9220A PRA ANESTHESIA: ICD-10-PCS | Mod: ,,, | Performed by: NURSE ANESTHETIST, CERTIFIED REGISTERED

## 2022-02-09 PROCEDURE — 88305 SURGICAL PATHOLOGY: ICD-10-PCS | Mod: 26,XU,, | Performed by: PATHOLOGY

## 2022-02-09 PROCEDURE — 63600175 PHARM REV CODE 636 W HCPCS: Performed by: NURSE ANESTHETIST, CERTIFIED REGISTERED

## 2022-02-09 PROCEDURE — 88342 SURGICAL PATHOLOGY: ICD-10-PCS | Mod: 26,,, | Performed by: PATHOLOGY

## 2022-02-09 PROCEDURE — 82962 GLUCOSE BLOOD TEST: CPT

## 2022-02-09 PROCEDURE — 25000003 PHARM REV CODE 250: Performed by: NURSE ANESTHETIST, CERTIFIED REGISTERED

## 2022-02-09 PROCEDURE — 43239 EGD BIOPSY SINGLE/MULTIPLE: CPT | Mod: ,,, | Performed by: STUDENT IN AN ORGANIZED HEALTH CARE EDUCATION/TRAINING PROGRAM

## 2022-02-09 PROCEDURE — 88342 IMHCHEM/IMCYTCHM 1ST ANTB: CPT | Mod: 26,,, | Performed by: PATHOLOGY

## 2022-02-09 PROCEDURE — 43239 EGD BIOPSY SINGLE/MULTIPLE: CPT

## 2022-02-09 RX ORDER — PROPOFOL 10 MG/ML
VIAL (ML) INTRAVENOUS
Status: DISCONTINUED | OUTPATIENT
Start: 2022-02-09 | End: 2022-02-09

## 2022-02-09 RX ORDER — LIDOCAINE HYDROCHLORIDE 20 MG/ML
INJECTION, SOLUTION EPIDURAL; INFILTRATION; INTRACAUDAL; PERINEURAL
Status: DISCONTINUED | OUTPATIENT
Start: 2022-02-09 | End: 2022-02-09

## 2022-02-09 RX ORDER — SODIUM CHLORIDE 9 MG/ML
INJECTION, SOLUTION INTRAVENOUS CONTINUOUS
Status: DISCONTINUED | OUTPATIENT
Start: 2022-02-09 | End: 2022-02-10 | Stop reason: HOSPADM

## 2022-02-09 RX ORDER — SODIUM CHLORIDE 0.9 % (FLUSH) 0.9 %
10 SYRINGE (ML) INJECTION
Status: DISCONTINUED | OUTPATIENT
Start: 2022-02-09 | End: 2022-02-10 | Stop reason: HOSPADM

## 2022-02-09 RX ADMIN — SODIUM CHLORIDE: 9 INJECTION, SOLUTION INTRAVENOUS at 08:02

## 2022-02-09 RX ADMIN — PROPOFOL 200 MG: 10 INJECTION, EMULSION INTRAVENOUS at 08:02

## 2022-02-09 RX ADMIN — LIDOCAINE HYDROCHLORIDE 100 MG: 20 INJECTION, SOLUTION EPIDURAL; INFILTRATION; INTRACAUDAL; PERINEURAL at 08:02

## 2022-02-09 NOTE — ANESTHESIA PREPROCEDURE EVALUATION
02/09/2022  Davon Barrios is a 71 y.o., male.    Anesthesia Evaluation    I have reviewed the Patient Summary Reports.    I have reviewed the Nursing Notes. I have reviewed the NPO Status.   I have reviewed the Medications.     Review of Systems  Anesthesia Hx:  No problems with previous Anesthesia  Neg history of prior surgery.   Cardiovascular:   Hypertension    Endocrine:   Diabetes, well controlled, type 2        Physical Exam  General:  Obesity    Airway/Jaw/Neck:  Airway Findings: Mouth Opening: Normal Tongue: Normal  Mallampati: II  TM Distance: Normal, at least 6 cm         Dental:  DENTAL FINDINGS: Normal   Chest/Lungs:  Chest/Lungs Findings: Clear to auscultation     Heart/Vascular:  Heart Findings: Rate: Normal  Rhythm: Regular Rhythm     Abdomen:  Abdomen Findings:  Normal, Soft     Musculoskeletal:  Musculoskeletal Findings:     Mental Status:  Mental Status Findings:  Cooperative         Anesthesia Plan  Type of Anesthesia, risks & benefits discussed:  Anesthesia Type:  MAC, general    Patient's Preference:   Plan Factors:          Intra-op Monitoring Plan:   Intra-op Monitoring Plan Comments:   Post Op Pain Control Plan:   Post Op Pain Control Plan Comments:     Induction:   IV  Beta Blocker:         Informed Consent: Patient understands risks and agrees with Anesthesia plan.  Questions answered. Anesthesia consent signed with patient.  ASA Score: 3     Day of Surgery Review of History & Physical: I have interviewed and examined the patient. I have reviewed the patient's H&P dated:  There are no significant changes.          Ready For Surgery From Anesthesia Perspective.

## 2022-02-09 NOTE — TRANSFER OF CARE
Anesthesia Transfer of Care Note    Patient: Davon STAPLES Temple    Procedure(s) Performed: * No procedures listed *    Patient location: PACU    Anesthesia Type: general    Transport from OR: Transported from OR on room air with adequate spontaneous ventilation    Post pain: adequate analgesia    Post assessment: no apparent anesthetic complications and tolerated procedure well    Post vital signs: stable    Level of consciousness: awake    Nausea/Vomiting: no nausea    Complications: none    Transfer of care protocol was followed      Last vitals:   Visit Vitals  BP (!) 173/78   Pulse 67   Temp 36.5 °C (97.7 °F) (Temporal)   Resp 12   Ht 6' (1.829 m)   Wt 117.9 kg (260 lb)   SpO2 100%   BMI 35.26 kg/m²

## 2022-02-09 NOTE — INTERVAL H&P NOTE
The patient has been examined and the H&P has been reviewed:    I concur with the findings and no changes have occurred since H&P was written.  EGD for diarrhea.   Prior colonoscopy with 2 polyps, fair prep, and relatively normal random colon biopsies.    There are no hospital problems to display for this patient.

## 2022-02-09 NOTE — DISCHARGE INSTRUCTIONS
Impression  Overall Impression: Duodenal erythema and scalloped mucosa. Biopsied. Random gastric and duodenal biopsies obtained. Lower esophageal linear ulcer.     Recommendation  Await pathology results  Followup in GI clinic in 2 weeks. If biopsies concerning for acid related changes, would consider PPI.   Recommend checking for total IgA level given low anti-tTG IgA.     Indication  Diarrhea, unspecified type    NO DRIVING, OPERATING MACHINERY, OR SIGNING LEGAL DOCUMENTS FOR 24 HOURS

## 2022-02-09 NOTE — ANESTHESIA POSTPROCEDURE EVALUATION
Anesthesia Post Evaluation    Patient: Davon Barrios    Procedure(s) Performed: * No procedures listed *    Final Anesthesia Type: general      Patient location during evaluation: PACU  Patient participation: Yes- Able to Participate  Level of consciousness: awake and alert  Post-procedure vital signs: reviewed and stable  Pain management: adequate  Airway patency: patent    PONV status at discharge: No PONV  Anesthetic complications: no      Cardiovascular status: blood pressure returned to baseline  Respiratory status: unassisted and spontaneous ventilation  Hydration status: euvolemic  Follow-up not needed.          Vitals Value Taken Time   /78 02/09/22 0851   Temp 36.5 °C (97.7 °F) 02/09/22 0851   Pulse 67 02/09/22 0851   Resp 12 02/09/22 0851   SpO2 100 % 02/09/22 0851         No case tracking events are documented in the log.      Pain/Jarred Score: No data recorded

## 2022-02-09 NOTE — TELEPHONE ENCOUNTER
----- Message from Crystal Paula sent at 2/8/2022  2:31 PM CST -----  Pt's wife called for him and would like to speak with a nurse   [phone]

## 2022-02-09 NOTE — TELEPHONE ENCOUNTER
Spoke with Ms Munroe. Reports pt is having severe loose watery stools and would like to d\c Trulicity. Reports pt has scope scheduled for today with his GI specialist. Advised to have pt walk in to clinic to see Tracey today, deferred by spouse. Advised to have pt report to ED if s\s worsen and to schedule follow up asap with Dr Arreaga. Ms Munroe verbalized understanding and reports she will contact clinic after pt's scope to schedule follow up. Dr Song notified.

## 2022-02-10 LAB
ESTROGEN SERPL-MCNC: NORMAL PG/ML
INSULIN SERPL-ACNC: NORMAL U[IU]/ML
LAB AP GROSS DESCRIPTION: NORMAL
LAB AP LABORATORY NOTES: NORMAL
T3RU NFR SERPL: NORMAL %

## 2022-02-15 ENCOUNTER — TELEPHONE (OUTPATIENT)
Dept: GASTROENTEROLOGY | Facility: CLINIC | Age: 72
End: 2022-02-15
Payer: MEDICARE

## 2022-02-15 NOTE — TELEPHONE ENCOUNTER
Pt wife notified and voiced understanding. Pt has follow up 02/23. Wife states pt has not had any diarrhea and has recently stopped taking trulicity injection.

## 2022-02-15 NOTE — TELEPHONE ENCOUNTER
----- Message from Phillip Kaur MD sent at 2/10/2022  1:56 PM CST -----  Please let patient know that biopsies did not show any evidence of celiac disease or reasons for diarrhea.  However we did biopsy an area that showed a precancerous polyp. Based on this we recommend a repeat EGD for resection of this sometime in the next year.

## 2022-02-23 ENCOUNTER — OFFICE VISIT (OUTPATIENT)
Dept: GASTROENTEROLOGY | Facility: CLINIC | Age: 72
End: 2022-02-23
Payer: MEDICARE

## 2022-02-23 VITALS
WEIGHT: 260 LBS | DIASTOLIC BLOOD PRESSURE: 62 MMHG | HEIGHT: 70 IN | OXYGEN SATURATION: 99 % | SYSTOLIC BLOOD PRESSURE: 148 MMHG | RESPIRATION RATE: 14 BRPM | HEART RATE: 80 BPM | BODY MASS INDEX: 37.22 KG/M2

## 2022-02-23 DIAGNOSIS — K31.7 POLYP OF DUODENUM: ICD-10-CM

## 2022-02-23 DIAGNOSIS — R76.8 LOW SERUM IGA FOR AGE: ICD-10-CM

## 2022-02-23 DIAGNOSIS — R19.7 DIARRHEA, UNSPECIFIED TYPE: Primary | ICD-10-CM

## 2022-02-23 PROCEDURE — 99213 PR OFFICE/OUTPT VISIT, EST, LEVL III, 20-29 MIN: ICD-10-PCS | Mod: ,,, | Performed by: NURSE PRACTITIONER

## 2022-02-23 PROCEDURE — 99213 OFFICE O/P EST LOW 20 MIN: CPT | Mod: ,,, | Performed by: NURSE PRACTITIONER

## 2022-02-23 NOTE — PROGRESS NOTES
Pt here for follow up after endoscopic evaluation for chronic diarrhea.  Doing great today, no further symptoms.  Pt had had a recent increase in his Trulicity for his DM at last visit and I was suspicious this was cause of diarrhea. He has since stopped the Trulicity since his upper scope and diarrhea has completely resolved.    Stool studies neg including CD, enteric pathogens, and O&P.  Thyroid studies also normal.    Pt's colonoscopy 1/19/22 showed precancerous polyps w/ rec repeat 5 years. Otherwise biopsies of the small bowel and colon do not show any changes that would be the cause of chronic diarrhea, though there was some nonspecific inflammation in the left colon.    Calprotectin, ESR, CRP WNL.    EGD 2/9/22 biopsies did not show any evidence of celiac disease or reasons for diarrhea. However we did biopsy an area that showed a precancerous polyp. Based on this we recommend a repeat EGD for resection of this sometime in the next year.    Remote hx of mild ALP elevation but all LFTs now WNL and normal liver u/s.    Past Medical History:   Diagnosis Date    Carcinoma of prostate     Hypertension     Sleep apnea     Type 2 diabetes mellitus     Vitamin B 12 deficiency(non anemic)    S/p nephrectomy 2/2 renal cell carcinoma    Review of Systems   Constitutional: Negative for chills and fever.   Respiratory: Negative for shortness of breath.    Cardiovascular: Negative for chest pain.   Gastrointestinal: Negative for abdominal pain, blood in stool, constipation, diarrhea, melena, nausea and vomiting.   Skin: Negative for rash.   Neurological: Negative for weakness.     Physical Exam  Vitals reviewed. Exam conducted with a chaperone present.   Constitutional:       Appearance: Normal appearance.   HENT:      Head: Normocephalic.   Eyes:      General: No scleral icterus.     Pupils: Pupils are equal, round, and reactive to light.   Cardiovascular:      Rate and Rhythm: Normal rate.      Pulses: Normal  pulses.   Pulmonary:      Effort: Pulmonary effort is normal.   Abdominal:      General: Abdomen is flat. There is no distension.      Palpations: Abdomen is soft.      Tenderness: There is no abdominal tenderness. There is no guarding or rebound.   Musculoskeletal:         General: No swelling.   Skin:     General: Skin is warm and dry.      Coloration: Skin is not jaundiced.   Neurological:      General: No focal deficit present.      Mental Status: He is alert and oriented to person, place, and time.   Psychiatric:         Mood and Affect: Mood normal.         Behavior: Behavior normal.         Thought Content: Thought content normal.         Judgment: Judgment normal.       Plan  -total IgA today (had low tTG IgA at last visit)  -EGD within next year for resection of precancerous duodenal polyp  -F/u PCP for DM management & creatinine  -RTC PRN

## 2022-03-03 ENCOUNTER — OFFICE VISIT (OUTPATIENT)
Dept: FAMILY MEDICINE | Facility: CLINIC | Age: 72
End: 2022-03-03
Payer: MEDICARE

## 2022-03-03 VITALS
OXYGEN SATURATION: 99 % | WEIGHT: 260 LBS | TEMPERATURE: 98 F | RESPIRATION RATE: 18 BRPM | HEART RATE: 68 BPM | DIASTOLIC BLOOD PRESSURE: 80 MMHG | SYSTOLIC BLOOD PRESSURE: 150 MMHG | HEIGHT: 70 IN | BODY MASS INDEX: 37.22 KG/M2

## 2022-03-03 DIAGNOSIS — E11.9 DIABETES MELLITUS WITHOUT COMPLICATION: Primary | ICD-10-CM

## 2022-03-03 DIAGNOSIS — E78.5 HYPERLIPIDEMIA, UNSPECIFIED HYPERLIPIDEMIA TYPE: ICD-10-CM

## 2022-03-03 DIAGNOSIS — I10 HYPERTENSION, UNSPECIFIED TYPE: ICD-10-CM

## 2022-03-03 PROCEDURE — 99213 PR OFFICE/OUTPT VISIT, EST, LEVL III, 20-29 MIN: ICD-10-PCS | Mod: ,,, | Performed by: FAMILY MEDICINE

## 2022-03-03 PROCEDURE — 99213 OFFICE O/P EST LOW 20 MIN: CPT | Mod: ,,, | Performed by: FAMILY MEDICINE

## 2022-03-03 RX ORDER — FUROSEMIDE 20 MG/1
20 TABLET ORAL DAILY
Qty: 90 TABLET | Refills: 1 | Status: SHIPPED | OUTPATIENT
Start: 2022-03-03 | End: 2022-08-16 | Stop reason: SDUPTHER

## 2022-03-03 RX ORDER — DAPAGLIFLOZIN 5 MG/1
5 TABLET, FILM COATED ORAL DAILY
COMMUNITY
End: 2022-08-16 | Stop reason: SDUPTHER

## 2022-03-03 NOTE — PROGRESS NOTES
Davon Barrios is a 71 y.o. male seen today for follow-up on his diabetes.  He reports his diarrhea did resolve when he discontinued his Trulicity.  His creatinine is elevated and since the patient is status post a complete nephrectomy on the right I am concerned about dialysis.  His elevated creatinine does limit which medications we can use a we discussed a trial of Farxiga to both help his diabetes as well as hopefully limit his renal decline.  We discussed the side effects of this medications such as hypotension and yeast and urinary tract infections.  Patient will mildly increase his intake of fluids and for the meantime hold his Lasix while taking the Farxiga.      Past Medical History:   Diagnosis Date    Carcinoma of prostate     Hypertension     Sleep apnea     Type 2 diabetes mellitus     Vitamin B 12 deficiency(non anemic)      Family History   Problem Relation Age of Onset    Diabetes Mother     Rectal cancer Father     Diabetes Father     Hypertension Father     Lung cancer Father     Hypertension Sister     Hypertension Brother      Current Outpatient Medications on File Prior to Visit   Medication Sig Dispense Refill    ADULT LOW DOSE ASPIRIN ORAL Take 81 mg by mouth once daily.      ascorbic Acid (VITAMIN C) 500 mg CpSR Take 500 mg by mouth once daily.      atorvastatin (LIPITOR) 10 MG tablet Take 10 mg by mouth once daily.      carvediloL (COREG) 3.125 MG tablet Take 3.125 mg by mouth 2 (two) times a day.      citalopram (CELEXA) 10 MG tablet Take 10 mg by mouth once daily.      cyclobenzaprine (FLEXERIL) 10 MG tablet Take 1 tablet (10 mg total) by mouth 2 (two) times a day. 180 tablet 1    dapagliflozin (FARXIGA) 5 mg Tab tablet Take 5 mg by mouth once daily.      enalapril (VASOTEC) 20 MG tablet Take 1 tablet (20 mg total) by mouth once daily. 90 tablet 0    ergocalciferol (ERGOCALCIFEROL) 50,000 unit Cap 1 capsule      folic acid (FOLVITE) 1 MG tablet Take 1 tablet (1 mg  "total) by mouth once daily. 90 tablet 1    gabapentin (NEURONTIN) 300 MG capsule Take 300 mg by mouth 3 (three) times daily.      insulin NPH-insulin regular, 70/30, (NOVOLIN 70/30) 100 unit/mL (70-30) injection Inject 42 Units into the skin 2 (two) times daily.      insulin syringe-needle U-100 (BD INSULIN SYRINGE) 1 mL 25 gauge x 5/8" Syrg BD Insulin Syringe 1 mL 25 gauge x 5/8"      latanoprost 0.005 % ophthalmic solution latanoprost 0.005 % eye drops      vitamin E 100 UNIT capsule Take 100 capsules by mouth once daily.      vitamin E 1000 UNIT capsule Take 1,000 Units by mouth once daily.      [DISCONTINUED] furosemide (LASIX) 20 MG tablet Take 20 mg by mouth once daily.      [DISCONTINUED] dulaglutide (TRULICITY) 3 mg/0.5 mL pen injector Inject 3 mg into the skin every 7 days. (Patient not taking: Reported on 3/3/2022) 4 pen 5     No current facility-administered medications on file prior to visit.     Immunization History   Administered Date(s) Administered    COVID-19, MRNA, LN-S, PF (MODERNA FULL 0.5 ML DOSE) 03/05/2021, 04/06/2021, 11/19/2021    Influenza - High Dose - PF (65 years and older) 03/01/2021    Influenza - Quadrivalent - PF *Preferred* (6 months and older) 09/16/2021    Influenza Split 01/01/2015    Pneumococcal Conjugate - 13 Valent 03/01/2021    Pneumococcal Polysaccharide - 23 Valent 09/03/2014       Review of Systems   Constitutional: Negative for fever, malaise/fatigue and weight loss.   Respiratory: Negative for shortness of breath.    Cardiovascular: Negative for chest pain and palpitations.   Gastrointestinal: Negative for nausea and vomiting.   Musculoskeletal: Positive for falls.   Psychiatric/Behavioral: Negative for depression.        Vitals:    03/03/22 1437   BP: (!) 150/80   Pulse: 68   Resp: 18   Temp: 98.3 °F (36.8 °C)       Physical Exam  Vitals reviewed.   Constitutional:       Appearance: Normal appearance.   HENT:      Head: Normocephalic.   Eyes:      " Extraocular Movements: Extraocular movements intact.      Conjunctiva/sclera: Conjunctivae normal.      Pupils: Pupils are equal, round, and reactive to light.   Neck:      Thyroid: No thyroid mass or thyromegaly.   Cardiovascular:      Rate and Rhythm: Normal rate and regular rhythm.      Heart sounds: Normal heart sounds. No murmur heard.    No gallop.   Pulmonary:      Effort: Pulmonary effort is normal. No respiratory distress.      Breath sounds: Normal breath sounds. No wheezing or rales.   Skin:     General: Skin is warm and dry.      Coloration: Skin is not jaundiced or pale.   Neurological:      Mental Status: He is alert.   Psychiatric:         Mood and Affect: Mood normal.         Behavior: Behavior normal.         Thought Content: Thought content normal.         Judgment: Judgment normal.          Assessment and Plan  Diabetes mellitus without complication    Hyperlipidemia, unspecified hyperlipidemia type    Hypertension, unspecified type  -     furosemide (LASIX) 20 MG tablet; Take 1 tablet (20 mg total) by mouth once daily.  Dispense: 90 tablet; Refill: 1            Return to clinic in 1 month with home blood sugar readings.  He will be set up with a freestyle meter today.  We will decrease his insulin to 42 units b.i.d. since her starting the Farxiga.  Patient has been made a follow-up appointment within the next 2 weeks with his nephrologist secondary to a creatinine of 2.4.    Health Maintenance Topics with due status: Not Due       Topic Last Completion Date    Lipid Panel 09/07/2021    Colorectal Cancer Screening 01/19/2022    Low Dose Statin 01/27/2022

## 2022-03-11 DIAGNOSIS — Z71.89 COMPLEX CARE COORDINATION: ICD-10-CM

## 2022-03-17 RX ORDER — GABAPENTIN 300 MG/1
300 CAPSULE ORAL 3 TIMES DAILY
Qty: 270 CAPSULE | Refills: 1 | Status: SHIPPED | OUTPATIENT
Start: 2022-03-17 | End: 2022-08-16 | Stop reason: SDUPTHER

## 2022-05-16 DIAGNOSIS — G89.29 CHRONIC LOW BACK PAIN WITHOUT SCIATICA, UNSPECIFIED BACK PAIN LATERALITY: ICD-10-CM

## 2022-05-16 DIAGNOSIS — I10 HYPERTENSION, UNSPECIFIED TYPE: ICD-10-CM

## 2022-05-16 DIAGNOSIS — M54.50 CHRONIC LOW BACK PAIN WITHOUT SCIATICA, UNSPECIFIED BACK PAIN LATERALITY: ICD-10-CM

## 2022-05-17 RX ORDER — CYCLOBENZAPRINE HCL 10 MG
10 TABLET ORAL 2 TIMES DAILY
Qty: 180 TABLET | Refills: 1 | Status: SHIPPED | OUTPATIENT
Start: 2022-05-17 | End: 2022-08-16 | Stop reason: SDUPTHER

## 2022-05-17 RX ORDER — ENALAPRIL MALEATE 20 MG/1
20 TABLET ORAL DAILY
Qty: 90 TABLET | Refills: 0 | Status: SHIPPED | OUTPATIENT
Start: 2022-05-17 | End: 2022-08-16 | Stop reason: SDUPTHER

## 2022-05-17 NOTE — TELEPHONE ENCOUNTER
----- Message from Radha Eisenberg MA sent at 5/16/2022 11:57 AM CDT -----  Please call in flexeril and enalapril to [facility] pharmacy.

## 2022-06-22 RX ORDER — ATORVASTATIN CALCIUM 10 MG/1
10 TABLET, FILM COATED ORAL DAILY
Qty: 90 TABLET | Refills: 1 | Status: SHIPPED | OUTPATIENT
Start: 2022-06-22 | End: 2022-08-16 | Stop reason: SDUPTHER

## 2022-06-22 RX ORDER — CARVEDILOL 3.12 MG/1
3.12 TABLET ORAL 2 TIMES DAILY
Qty: 90 TABLET | Refills: 1 | Status: SHIPPED | OUTPATIENT
Start: 2022-06-22 | End: 2022-09-27

## 2022-08-16 ENCOUNTER — OFFICE VISIT (OUTPATIENT)
Dept: FAMILY MEDICINE | Facility: CLINIC | Age: 72
End: 2022-08-16
Payer: MEDICARE

## 2022-08-16 VITALS
RESPIRATION RATE: 18 BRPM | SYSTOLIC BLOOD PRESSURE: 158 MMHG | OXYGEN SATURATION: 95 % | DIASTOLIC BLOOD PRESSURE: 80 MMHG | HEIGHT: 70 IN | HEART RATE: 70 BPM | BODY MASS INDEX: 37.31 KG/M2

## 2022-08-16 DIAGNOSIS — M54.50 CHRONIC LOW BACK PAIN WITHOUT SCIATICA, UNSPECIFIED BACK PAIN LATERALITY: ICD-10-CM

## 2022-08-16 DIAGNOSIS — G89.29 CHRONIC LOW BACK PAIN WITHOUT SCIATICA, UNSPECIFIED BACK PAIN LATERALITY: ICD-10-CM

## 2022-08-16 DIAGNOSIS — R63.4 WEIGHT LOSS: ICD-10-CM

## 2022-08-16 DIAGNOSIS — E11.9 DIABETES MELLITUS WITHOUT COMPLICATION: Primary | ICD-10-CM

## 2022-08-16 DIAGNOSIS — F32.A DEPRESSION, UNSPECIFIED DEPRESSION TYPE: ICD-10-CM

## 2022-08-16 DIAGNOSIS — I10 HYPERTENSION, UNSPECIFIED TYPE: ICD-10-CM

## 2022-08-16 DIAGNOSIS — E78.5 HYPERLIPIDEMIA, UNSPECIFIED HYPERLIPIDEMIA TYPE: ICD-10-CM

## 2022-08-16 LAB
CREAT UR-MCNC: 146 MG/DL (ref 39–259)
GLUCOSE SERPL-MCNC: 135 MG/DL (ref 70–110)
MICROALBUMIN UR-MCNC: 10.1 MG/DL (ref 0–2.8)
MICROALBUMIN/CREAT RATIO PNL UR: 69.2 MG/G (ref 0–30)

## 2022-08-16 PROCEDURE — 84443 ASSAY THYROID STIM HORMONE: CPT | Mod: ,,, | Performed by: CLINICAL MEDICAL LABORATORY

## 2022-08-16 PROCEDURE — 82043 MICROALBUMIN / CREATININE RATIO URINE: ICD-10-PCS | Mod: ,,, | Performed by: CLINICAL MEDICAL LABORATORY

## 2022-08-16 PROCEDURE — 99214 OFFICE O/P EST MOD 30 MIN: CPT | Mod: ,,, | Performed by: FAMILY MEDICINE

## 2022-08-16 PROCEDURE — 80053 COMPREHENSIVE METABOLIC PANEL: ICD-10-PCS | Mod: ,,, | Performed by: CLINICAL MEDICAL LABORATORY

## 2022-08-16 PROCEDURE — 82570 MICROALBUMIN / CREATININE RATIO URINE: ICD-10-PCS | Mod: ,,, | Performed by: CLINICAL MEDICAL LABORATORY

## 2022-08-16 PROCEDURE — 82043 UR ALBUMIN QUANTITATIVE: CPT | Mod: ,,, | Performed by: CLINICAL MEDICAL LABORATORY

## 2022-08-16 PROCEDURE — 84439 ASSAY OF FREE THYROXINE: CPT | Mod: ,,, | Performed by: CLINICAL MEDICAL LABORATORY

## 2022-08-16 PROCEDURE — 83036 HEMOGLOBIN A1C: ICD-10-PCS | Mod: ,,, | Performed by: CLINICAL MEDICAL LABORATORY

## 2022-08-16 PROCEDURE — 83036 HEMOGLOBIN GLYCOSYLATED A1C: CPT | Mod: ,,, | Performed by: CLINICAL MEDICAL LABORATORY

## 2022-08-16 PROCEDURE — 84439 T4, FREE: ICD-10-PCS | Mod: ,,, | Performed by: CLINICAL MEDICAL LABORATORY

## 2022-08-16 PROCEDURE — 85025 CBC WITH DIFFERENTIAL: ICD-10-PCS | Mod: ,,, | Performed by: CLINICAL MEDICAL LABORATORY

## 2022-08-16 PROCEDURE — 82570 ASSAY OF URINE CREATININE: CPT | Mod: ,,, | Performed by: CLINICAL MEDICAL LABORATORY

## 2022-08-16 PROCEDURE — 84443 TSH: ICD-10-PCS | Mod: ,,, | Performed by: CLINICAL MEDICAL LABORATORY

## 2022-08-16 PROCEDURE — 85025 COMPLETE CBC W/AUTO DIFF WBC: CPT | Mod: ,,, | Performed by: CLINICAL MEDICAL LABORATORY

## 2022-08-16 PROCEDURE — 80053 COMPREHEN METABOLIC PANEL: CPT | Mod: ,,, | Performed by: CLINICAL MEDICAL LABORATORY

## 2022-08-16 PROCEDURE — 99214 PR OFFICE/OUTPT VISIT, EST, LEVL IV, 30-39 MIN: ICD-10-PCS | Mod: ,,, | Performed by: FAMILY MEDICINE

## 2022-08-16 RX ORDER — FUROSEMIDE 20 MG/1
20 TABLET ORAL DAILY
Qty: 90 TABLET | Refills: 1 | Status: SHIPPED | OUTPATIENT
Start: 2022-08-16 | End: 2023-02-21

## 2022-08-16 RX ORDER — ATORVASTATIN CALCIUM 10 MG/1
10 TABLET, FILM COATED ORAL DAILY
Qty: 90 TABLET | Refills: 1 | Status: SHIPPED | OUTPATIENT
Start: 2022-08-16 | End: 2023-03-31

## 2022-08-16 RX ORDER — GABAPENTIN 300 MG/1
300 CAPSULE ORAL 3 TIMES DAILY
Qty: 270 CAPSULE | Refills: 1 | Status: SHIPPED | OUTPATIENT
Start: 2022-08-16 | End: 2022-10-25 | Stop reason: SDUPTHER

## 2022-08-16 RX ORDER — CITALOPRAM 20 MG/1
20 TABLET, FILM COATED ORAL DAILY
Qty: 90 TABLET | Refills: 1 | Status: SHIPPED | OUTPATIENT
Start: 2022-08-16 | End: 2023-01-03 | Stop reason: SDUPTHER

## 2022-08-16 RX ORDER — CYCLOBENZAPRINE HCL 10 MG
10 TABLET ORAL 2 TIMES DAILY
Qty: 180 TABLET | Refills: 1 | Status: SHIPPED | OUTPATIENT
Start: 2022-08-16 | End: 2022-08-16 | Stop reason: SDUPTHER

## 2022-08-16 RX ORDER — CARVEDILOL 3.12 MG/1
3.12 TABLET ORAL 2 TIMES DAILY
Qty: 90 TABLET | Refills: 1 | Status: CANCELLED | OUTPATIENT
Start: 2022-08-16

## 2022-08-16 RX ORDER — CYCLOBENZAPRINE HCL 10 MG
10 TABLET ORAL 2 TIMES DAILY
Qty: 180 TABLET | Refills: 1 | Status: SHIPPED | OUTPATIENT
Start: 2022-08-16 | End: 2023-02-21 | Stop reason: ALTCHOICE

## 2022-08-16 RX ORDER — ATORVASTATIN CALCIUM 10 MG/1
10 TABLET, FILM COATED ORAL DAILY
Qty: 90 TABLET | Refills: 1 | Status: CANCELLED | OUTPATIENT
Start: 2022-08-16

## 2022-08-16 RX ORDER — DAPAGLIFLOZIN 5 MG/1
5 TABLET, FILM COATED ORAL DAILY
Qty: 90 TABLET | Refills: 1 | Status: SHIPPED | OUTPATIENT
Start: 2022-08-16 | End: 2022-08-25

## 2022-08-16 RX ORDER — CITALOPRAM 10 MG/1
10 TABLET ORAL DAILY
Qty: 90 TABLET | Refills: 1 | Status: CANCELLED | OUTPATIENT
Start: 2022-08-16

## 2022-08-16 RX ORDER — CITALOPRAM 10 MG/1
10 TABLET ORAL DAILY
Qty: 90 TABLET | Refills: 1 | Status: SHIPPED | OUTPATIENT
Start: 2022-08-16 | End: 2022-08-16

## 2022-08-16 RX ORDER — ENALAPRIL MALEATE 20 MG/1
20 TABLET ORAL DAILY
Qty: 90 TABLET | Refills: 0 | Status: SHIPPED | OUTPATIENT
Start: 2022-08-16 | End: 2022-12-08

## 2022-08-16 NOTE — PROGRESS NOTES
"Davon Barrios is a 72 y.o. male seen today for follow-up on his diabetes hypertension and chronic insomnia and depression.  Patient is having more difficulty sleeping especially controlling intrusive thoughts at night that keeps him awake.  We discussed increasing his Celexa to 20 mg in the evening.    Past Medical History:   Diagnosis Date    Carcinoma of prostate     Hypertension     Sleep apnea     Type 2 diabetes mellitus     Vitamin B 12 deficiency(non anemic)      Family History   Problem Relation Age of Onset    Diabetes Mother     Rectal cancer Father     Diabetes Father     Hypertension Father     Lung cancer Father     Hypertension Sister     Hypertension Brother      Current Outpatient Medications on File Prior to Visit   Medication Sig Dispense Refill    ADULT LOW DOSE ASPIRIN ORAL Take 81 mg by mouth once daily.      ascorbic Acid (VITAMIN C) 500 mg CpSR Take 500 mg by mouth once daily.      carvediloL (COREG) 3.125 MG tablet Take 1 tablet (3.125 mg total) by mouth 2 (two) times a day. 90 tablet 1    ergocalciferol (ERGOCALCIFEROL) 50,000 unit Cap 1 capsule      folic acid (FOLVITE) 1 MG tablet Take 1 tablet (1 mg total) by mouth once daily. 90 tablet 1    insulin NPH-insulin regular, 70/30, (NOVOLIN 70/30) 100 unit/mL (70-30) injection Inject 42 Units into the skin 2 (two) times daily.      insulin syringe-needle U-100 (BD INSULIN SYRINGE) 1 mL 25 gauge x 5/8" Syrg BD Insulin Syringe 1 mL 25 gauge x 5/8"      latanoprost 0.005 % ophthalmic solution latanoprost 0.005 % eye drops      vitamin E 100 UNIT capsule Take 100 capsules by mouth once daily.      vitamin E 1000 UNIT capsule Take 1,000 Units by mouth once daily.      [DISCONTINUED] atorvastatin (LIPITOR) 10 MG tablet Take 1 tablet (10 mg total) by mouth once daily. 90 tablet 1    [DISCONTINUED] citalopram (CELEXA) 10 MG tablet Take 10 mg by mouth once daily.      [DISCONTINUED] cyclobenzaprine (FLEXERIL) 10 MG tablet " TAKE 1 TABLET (10 MG TOTAL) BY MOUTH 2 (TWO) TIMES A DAY. 180 tablet 1    [DISCONTINUED] dapagliflozin (FARXIGA) 5 mg Tab tablet Take 5 mg by mouth once daily.      [DISCONTINUED] enalapril (VASOTEC) 20 MG tablet TAKE 1 TABLET (20 MG TOTAL) BY MOUTH ONCE DAILY. 90 tablet 0    [DISCONTINUED] furosemide (LASIX) 20 MG tablet Take 1 tablet (20 mg total) by mouth once daily. 90 tablet 1    [DISCONTINUED] gabapentin (NEURONTIN) 300 MG capsule Take 1 capsule (300 mg total) by mouth 3 (three) times daily. 270 capsule 1     No current facility-administered medications on file prior to visit.     Immunization History   Administered Date(s) Administered    COVID-19, MRNA, LN-S, PF (MODERNA FULL 0.5 ML DOSE) 03/05/2021, 04/06/2021, 11/19/2021    Influenza - High Dose - PF (65 years and older) 03/01/2021    Influenza - Quadrivalent - PF *Preferred* (6 months and older) 09/16/2021    Influenza Split 01/01/2015    Pneumococcal Conjugate - 13 Valent 03/01/2021    Pneumococcal Polysaccharide - 23 Valent 09/03/2014       Review of Systems   Constitutional: Positive for malaise/fatigue and weight loss. Negative for fever.   Respiratory: Negative for shortness of breath.    Cardiovascular: Negative for chest pain and palpitations.   Gastrointestinal: Negative for nausea and vomiting.   Psychiatric/Behavioral: Negative for depression. The patient has insomnia.         Vitals:    08/16/22 1503   BP: (!) 158/80   Pulse: 70   Resp: 18       Physical Exam  Vitals reviewed.   Constitutional:       Appearance: Normal appearance.   HENT:      Head: Normocephalic.   Eyes:      Extraocular Movements: Extraocular movements intact.      Conjunctiva/sclera: Conjunctivae normal.      Pupils: Pupils are equal, round, and reactive to light.   Neck:      Thyroid: No thyroid mass or thyromegaly.   Cardiovascular:      Rate and Rhythm: Normal rate and regular rhythm.      Heart sounds: Normal heart sounds. No murmur heard.    No gallop.    Pulmonary:      Effort: Pulmonary effort is normal. No respiratory distress.      Breath sounds: Normal breath sounds. No wheezing or rales.   Skin:     General: Skin is warm and dry.      Coloration: Skin is not jaundiced or pale.   Neurological:      Mental Status: He is alert.   Psychiatric:         Mood and Affect: Mood normal.         Behavior: Behavior normal.         Thought Content: Thought content normal.         Judgment: Judgment normal.          Assessment and Plan  Diabetes mellitus without complication  -     POCT glucose  -     Microalbumin/Creatinine Ratio, Urine  -     dapagliflozin (FARXIGA) 5 mg Tab tablet; Take 1 tablet (5 mg total) by mouth once daily.  Dispense: 90 tablet; Refill: 1  -     Hemoglobin A1C; Future; Expected date: 08/16/2022    Hypertension, unspecified type  -     furosemide (LASIX) 20 MG tablet; Take 1 tablet (20 mg total) by mouth once daily.  Dispense: 90 tablet; Refill: 1  -     enalapril (VASOTEC) 20 MG tablet; Take 1 tablet (20 mg total) by mouth once daily.  Dispense: 90 tablet; Refill: 0  -     CBC Auto Differential; Future; Expected date: 08/16/2022  -     Comprehensive Metabolic Panel; Future; Expected date: 08/16/2022    Chronic low back pain without sciatica, unspecified back pain laterality  -     gabapentin (NEURONTIN) 300 MG capsule; Take 1 capsule (300 mg total) by mouth 3 (three) times daily.  Dispense: 270 capsule; Refill: 1  -     Discontinue: cyclobenzaprine (FLEXERIL) 10 MG tablet; Take 1 tablet (10 mg total) by mouth 2 (two) times a day.  Dispense: 180 tablet; Refill: 1  -     cyclobenzaprine (FLEXERIL) 10 MG tablet; Take 1 tablet (10 mg total) by mouth 2 (two) times a day.  Dispense: 180 tablet; Refill: 1    Hyperlipidemia, unspecified hyperlipidemia type  -     atorvastatin (LIPITOR) 10 MG tablet; Take 1 tablet (10 mg total) by mouth once daily.  Dispense: 90 tablet; Refill: 1    Depression, unspecified depression type  -     Discontinue: citalopram  (CELEXA) 10 MG tablet; Take 1 tablet (10 mg total) by mouth once daily.  Dispense: 90 tablet; Refill: 1  -     citalopram (CELEXA) 20 MG tablet; Take 1 tablet (20 mg total) by mouth once daily.  Dispense: 90 tablet; Refill: 1    Weight loss  -     TSH; Future; Expected date: 08/16/2022  -     T4, Free; Future; Expected date: 08/16/2022            Return to clinic in the 3 months for follow-up or as needed once his blood work is in.    Health Maintenance Topics with due status: Not Due       Topic Last Completion Date    Lipid Panel 09/07/2021    Influenza Vaccine 09/16/2021    Colorectal Cancer Screening 01/19/2022    Eye Exam 08/09/2022    Low Dose Statin 08/16/2022    Foot Exam 08/16/2022

## 2022-08-17 LAB
ALBUMIN SERPL BCP-MCNC: 3.7 G/DL (ref 3.5–5)
ALBUMIN/GLOB SERPL: 0.9 {RATIO}
ALP SERPL-CCNC: 87 U/L (ref 45–115)
ALT SERPL W P-5'-P-CCNC: 28 U/L (ref 16–61)
ANION GAP SERPL CALCULATED.3IONS-SCNC: 13 MMOL/L (ref 7–16)
AST SERPL W P-5'-P-CCNC: 23 U/L (ref 15–37)
BASOPHILS # BLD AUTO: 0.05 K/UL (ref 0–0.2)
BASOPHILS NFR BLD AUTO: 0.6 % (ref 0–1)
BILIRUB SERPL-MCNC: 0.3 MG/DL (ref 0–1.2)
BUN SERPL-MCNC: 24 MG/DL (ref 7–18)
BUN/CREAT SERPL: 14 (ref 6–20)
CALCIUM SERPL-MCNC: 9.5 MG/DL (ref 8.5–10.1)
CHLORIDE SERPL-SCNC: 108 MMOL/L (ref 98–107)
CO2 SERPL-SCNC: 22 MMOL/L (ref 21–32)
CREAT SERPL-MCNC: 1.67 MG/DL (ref 0.7–1.3)
DIFFERENTIAL METHOD BLD: ABNORMAL
EGFR (NO RACE VARIABLE) (RUSH/TITUS): 43 ML/MIN/1.73M²
EOSINOPHIL # BLD AUTO: 0.15 K/UL (ref 0–0.5)
EOSINOPHIL NFR BLD AUTO: 1.8 % (ref 1–4)
ERYTHROCYTE [DISTWIDTH] IN BLOOD BY AUTOMATED COUNT: 13 % (ref 11.5–14.5)
EST. AVERAGE GLUCOSE BLD GHB EST-MCNC: 284 MG/DL
GLOBULIN SER-MCNC: 3.9 G/DL (ref 2–4)
GLUCOSE SERPL-MCNC: 208 MG/DL (ref 74–106)
HBA1C MFR BLD HPLC: 11.1 % (ref 4.5–6.6)
HCT VFR BLD AUTO: 41 % (ref 40–54)
HGB BLD-MCNC: 13.9 G/DL (ref 13.5–18)
IMM GRANULOCYTES # BLD AUTO: 0.06 K/UL (ref 0–0.04)
IMM GRANULOCYTES NFR BLD: 0.7 % (ref 0–0.4)
LYMPHOCYTES # BLD AUTO: 2.31 K/UL (ref 1–4.8)
LYMPHOCYTES NFR BLD AUTO: 27.6 % (ref 27–41)
MCH RBC QN AUTO: 30.4 PG (ref 27–31)
MCHC RBC AUTO-ENTMCNC: 33.9 G/DL (ref 32–36)
MCV RBC AUTO: 89.7 FL (ref 80–96)
MONOCYTES # BLD AUTO: 0.66 K/UL (ref 0–0.8)
MONOCYTES NFR BLD AUTO: 7.9 % (ref 2–6)
MPC BLD CALC-MCNC: 11.3 FL (ref 9.4–12.4)
NEUTROPHILS # BLD AUTO: 5.14 K/UL (ref 1.8–7.7)
NEUTROPHILS NFR BLD AUTO: 61.4 % (ref 53–65)
NRBC # BLD AUTO: 0 X10E3/UL
NRBC, AUTO (.00): 0 %
PLATELET # BLD AUTO: 217 K/UL (ref 150–400)
POTASSIUM SERPL-SCNC: 4.7 MMOL/L (ref 3.5–5.1)
PROT SERPL-MCNC: 7.6 G/DL (ref 6.4–8.2)
RBC # BLD AUTO: 4.57 M/UL (ref 4.6–6.2)
SODIUM SERPL-SCNC: 138 MMOL/L (ref 136–145)
T4 FREE SERPL-MCNC: 0.77 NG/DL (ref 0.76–1.46)
TSH SERPL DL<=0.005 MIU/L-ACNC: 1.19 UIU/ML (ref 0.36–3.74)
WBC # BLD AUTO: 8.37 K/UL (ref 4.5–11)

## 2022-08-18 ENCOUNTER — TELEPHONE (OUTPATIENT)
Dept: FAMILY MEDICINE | Facility: CLINIC | Age: 72
End: 2022-08-18
Payer: MEDICARE

## 2022-08-18 NOTE — TELEPHONE ENCOUNTER
----- Message from Oc Song MD sent at 8/18/2022  7:55 AM CDT -----  Office visit for diabetes renal function and microalbumin

## 2022-08-25 ENCOUNTER — OFFICE VISIT (OUTPATIENT)
Dept: FAMILY MEDICINE | Facility: CLINIC | Age: 72
End: 2022-08-25
Payer: MEDICARE

## 2022-08-25 VITALS
SYSTOLIC BLOOD PRESSURE: 138 MMHG | TEMPERATURE: 98 F | DIASTOLIC BLOOD PRESSURE: 82 MMHG | OXYGEN SATURATION: 97 % | HEIGHT: 70 IN | HEART RATE: 82 BPM | WEIGHT: 260 LBS | RESPIRATION RATE: 18 BRPM | BODY MASS INDEX: 37.22 KG/M2

## 2022-08-25 DIAGNOSIS — Z79.4 TYPE 2 DIABETES MELLITUS WITH MICROALBUMINURIA, WITH LONG-TERM CURRENT USE OF INSULIN: Primary | ICD-10-CM

## 2022-08-25 DIAGNOSIS — R80.9 TYPE 2 DIABETES MELLITUS WITH MICROALBUMINURIA, WITH LONG-TERM CURRENT USE OF INSULIN: Primary | ICD-10-CM

## 2022-08-25 DIAGNOSIS — E11.29 TYPE 2 DIABETES MELLITUS WITH MICROALBUMINURIA, WITH LONG-TERM CURRENT USE OF INSULIN: Primary | ICD-10-CM

## 2022-08-25 PROCEDURE — 99213 OFFICE O/P EST LOW 20 MIN: CPT | Mod: ,,, | Performed by: FAMILY MEDICINE

## 2022-08-25 PROCEDURE — 99213 PR OFFICE/OUTPT VISIT, EST, LEVL III, 20-29 MIN: ICD-10-PCS | Mod: ,,, | Performed by: FAMILY MEDICINE

## 2022-08-25 RX ORDER — DAPAGLIFLOZIN 10 MG/1
10 TABLET, FILM COATED ORAL DAILY
Qty: 30 TABLET | Refills: 5 | Status: SHIPPED | OUTPATIENT
Start: 2022-08-25 | End: 2023-03-20 | Stop reason: SDUPTHER

## 2022-08-25 NOTE — PROGRESS NOTES
Davon Barrios is a 72 y.o. male seen today for follow-up on his insomnia.  He reports 2 nights out of the 7 he did sleep much better and we will continue this dose for now.  On his lab work, patient did have an A1c of 11.1 with an elevated GFR and was positive for microalbuminuria.  She is status post nephrectomy.       Past Medical History:   Diagnosis Date    Carcinoma of prostate     Hypertension     Sleep apnea     Type 2 diabetes mellitus     Vitamin B 12 deficiency(non anemic)      Family History   Problem Relation Age of Onset    Diabetes Mother     Rectal cancer Father     Diabetes Father     Hypertension Father     Lung cancer Father     Hypertension Sister     Hypertension Brother      Current Outpatient Medications on File Prior to Visit   Medication Sig Dispense Refill    ADULT LOW DOSE ASPIRIN ORAL Take 81 mg by mouth once daily.      ascorbic Acid (VITAMIN C) 500 mg CpSR Take 500 mg by mouth once daily.      atorvastatin (LIPITOR) 10 MG tablet Take 1 tablet (10 mg total) by mouth once daily. 90 tablet 1    carvediloL (COREG) 3.125 MG tablet Take 1 tablet (3.125 mg total) by mouth 2 (two) times a day. 90 tablet 1    citalopram (CELEXA) 20 MG tablet Take 1 tablet (20 mg total) by mouth once daily. 90 tablet 1    cyclobenzaprine (FLEXERIL) 10 MG tablet Take 1 tablet (10 mg total) by mouth 2 (two) times a day. 180 tablet 1    enalapril (VASOTEC) 20 MG tablet Take 1 tablet (20 mg total) by mouth once daily. 90 tablet 0    ergocalciferol (ERGOCALCIFEROL) 50,000 unit Cap 1 capsule      folic acid (FOLVITE) 1 MG tablet Take 1 tablet (1 mg total) by mouth once daily. 90 tablet 1    furosemide (LASIX) 20 MG tablet Take 1 tablet (20 mg total) by mouth once daily. 90 tablet 1    gabapentin (NEURONTIN) 300 MG capsule Take 1 capsule (300 mg total) by mouth 3 (three) times daily. 270 capsule 1    insulin NPH-insulin regular, 70/30, (NOVOLIN 70/30) 100 unit/mL (70-30) injection Inject 42  "Units into the skin 2 (two) times daily.      insulin syringe-needle U-100 (BD INSULIN SYRINGE) 1 mL 25 gauge x 5/8" Syrg BD Insulin Syringe 1 mL 25 gauge x 5/8"      latanoprost 0.005 % ophthalmic solution latanoprost 0.005 % eye drops      vitamin E 100 UNIT capsule Take 100 capsules by mouth once daily.      vitamin E 1000 UNIT capsule Take 1,000 Units by mouth once daily.      [DISCONTINUED] dapagliflozin (FARXIGA) 5 mg Tab tablet Take 1 tablet (5 mg total) by mouth once daily. 90 tablet 1     No current facility-administered medications on file prior to visit.     Immunization History   Administered Date(s) Administered    COVID-19, MRNA, LN-S, PF (MODERNA FULL 0.5 ML DOSE) 03/05/2021, 04/06/2021, 11/19/2021    Influenza - High Dose - PF (65 years and older) 03/01/2021    Influenza - Quadrivalent - PF *Preferred* (6 months and older) 09/16/2021    Influenza Split 01/01/2015    Pneumococcal Conjugate - 13 Valent 03/01/2021    Pneumococcal Polysaccharide - 23 Valent 09/03/2014       Review of Systems   Constitutional: Positive for malaise/fatigue. Negative for fever and weight loss.   Respiratory: Negative for shortness of breath.    Cardiovascular: Negative for chest pain and palpitations.   Gastrointestinal: Negative for nausea and vomiting.   Musculoskeletal: Positive for myalgias. Negative for falls.   Psychiatric/Behavioral: Negative for depression.        Vitals:    08/25/22 1531   BP: 138/82   Pulse: 82   Resp: 18   Temp: 97.8 °F (36.6 °C)       Physical Exam  Eyes:      Conjunctiva/sclera: Conjunctivae normal.   Pulmonary:      Effort: Pulmonary effort is normal.   Neurological:      General: No focal deficit present.      Mental Status: He is alert.   Psychiatric:         Mood and Affect: Mood normal.         Behavior: Behavior normal.         Thought Content: Thought content normal.         Judgment: Judgment normal.          Assessment and Plan  Type 2 diabetes mellitus with " microalbuminuria, with long-term current use of insulin  -     dapagliflozin (FARXIGA) 10 mg tablet; Take 1 tablet (10 mg total) by mouth once daily.  Dispense: 30 tablet; Refill: 5            Return to clinic in 3 months for follow-up on his A1c and renal function.  Patient will also set up a follow-up with his nephrologist.      Health Maintenance Topics with due status: Not Due       Topic Last Completion Date    Lipid Panel 09/07/2021    Influenza Vaccine 09/16/2021    Colorectal Cancer Screening 01/19/2022    Eye Exam 08/09/2022    Diabetes Urine Screening 08/16/2022    Foot Exam 08/16/2022    Hemoglobin A1c 08/16/2022    Low Dose Statin 08/25/2022

## 2022-09-16 ENCOUNTER — LAB REQUISITION (OUTPATIENT)
Dept: LAB | Facility: HOSPITAL | Age: 72
End: 2022-09-16
Payer: MEDICARE

## 2022-09-16 DIAGNOSIS — E11.9 TYPE 2 DIABETES MELLITUS WITHOUT COMPLICATIONS: ICD-10-CM

## 2022-09-16 DIAGNOSIS — R44.3 HALLUCINATIONS, UNSPECIFIED: ICD-10-CM

## 2022-09-16 DIAGNOSIS — N39.0 URINARY TRACT INFECTION, SITE NOT SPECIFIED: ICD-10-CM

## 2022-09-16 LAB
BACTERIA #/AREA URNS HPF: ABNORMAL /HPF
BILIRUB UR QL STRIP: NEGATIVE
CLARITY UR: CLEAR
COLOR UR: ABNORMAL
GLUCOSE UR STRIP-MCNC: 500 MG/DL
KETONES UR STRIP-SCNC: NEGATIVE MG/DL
LEUKOCYTE ESTERASE UR QL STRIP: NEGATIVE
NITRITE UR QL STRIP: NEGATIVE
PH UR STRIP: 5 PH UNITS
PROT UR QL STRIP: NEGATIVE
RBC # UR STRIP: ABNORMAL /UL
RBC #/AREA URNS HPF: ABNORMAL /HPF
SP GR UR STRIP: 1.01
UROBILINOGEN UR STRIP-ACNC: 0.2 MG/DL
WBC #/AREA URNS HPF: ABNORMAL /HPF

## 2022-09-16 PROCEDURE — 87086 URINE CULTURE/COLONY COUNT: CPT

## 2022-09-16 PROCEDURE — 81001 URINALYSIS AUTO W/SCOPE: CPT

## 2022-09-18 LAB — UA COMPLETE W REFLEX CULTURE PNL UR: NO GROWTH

## 2022-10-09 DIAGNOSIS — Z71.89 COMPLEX CARE COORDINATION: ICD-10-CM

## 2022-10-18 ENCOUNTER — TELEPHONE (OUTPATIENT)
Dept: FAMILY MEDICINE | Facility: CLINIC | Age: 72
End: 2022-10-18
Payer: COMMERCIAL

## 2022-10-18 NOTE — TELEPHONE ENCOUNTER
----- Message from Leticia Rosas sent at 10/17/2022  3:46 PM CDT -----  Regarding: Samples dapagliflozin (FARXIGA) 10 mg tablet/call back about Mirtha 2  Contact: Patient's Spouse  Pt needs: dapagliflozin (FARXIGA) 10 mg tablet.  Call back about Mirtha 2.      Phone #:  [phone]

## 2022-10-19 ENCOUNTER — TELEPHONE (OUTPATIENT)
Dept: FAMILY MEDICINE | Facility: CLINIC | Age: 72
End: 2022-10-19
Payer: COMMERCIAL

## 2022-10-25 DIAGNOSIS — M54.50 CHRONIC LOW BACK PAIN WITHOUT SCIATICA, UNSPECIFIED BACK PAIN LATERALITY: ICD-10-CM

## 2022-10-25 DIAGNOSIS — G89.29 CHRONIC LOW BACK PAIN WITHOUT SCIATICA, UNSPECIFIED BACK PAIN LATERALITY: ICD-10-CM

## 2022-10-25 RX ORDER — GABAPENTIN 300 MG/1
300 CAPSULE ORAL 3 TIMES DAILY
Qty: 270 CAPSULE | Refills: 1 | Status: SHIPPED | OUTPATIENT
Start: 2022-10-25 | End: 2023-11-03 | Stop reason: SDUPTHER

## 2023-02-21 ENCOUNTER — OFFICE VISIT (OUTPATIENT)
Dept: FAMILY MEDICINE | Facility: CLINIC | Age: 73
End: 2023-02-21
Payer: MEDICARE

## 2023-02-21 VITALS
OXYGEN SATURATION: 95 % | HEIGHT: 70 IN | SYSTOLIC BLOOD PRESSURE: 144 MMHG | HEART RATE: 70 BPM | DIASTOLIC BLOOD PRESSURE: 68 MMHG | BODY MASS INDEX: 36.08 KG/M2 | WEIGHT: 252 LBS | RESPIRATION RATE: 19 BRPM

## 2023-02-21 DIAGNOSIS — E53.8 B12 DEFICIENCY: ICD-10-CM

## 2023-02-21 DIAGNOSIS — R80.9 TYPE 2 DIABETES MELLITUS WITH MICROALBUMINURIA, WITH LONG-TERM CURRENT USE OF INSULIN: ICD-10-CM

## 2023-02-21 DIAGNOSIS — E11.29 TYPE 2 DIABETES MELLITUS WITH MICROALBUMINURIA, WITH LONG-TERM CURRENT USE OF INSULIN: ICD-10-CM

## 2023-02-21 DIAGNOSIS — F51.5 NIGHTMARES: Primary | ICD-10-CM

## 2023-02-21 DIAGNOSIS — Z79.4 TYPE 2 DIABETES MELLITUS WITH MICROALBUMINURIA, WITH LONG-TERM CURRENT USE OF INSULIN: ICD-10-CM

## 2023-02-21 PROCEDURE — 99214 OFFICE O/P EST MOD 30 MIN: CPT | Mod: ,,, | Performed by: FAMILY MEDICINE

## 2023-02-21 PROCEDURE — 1101F PR PT FALLS ASSESS DOC 0-1 FALLS W/OUT INJ PAST YR: ICD-10-PCS | Mod: ,,, | Performed by: FAMILY MEDICINE

## 2023-02-21 PROCEDURE — 36415 COLL VENOUS BLD VENIPUNCTURE: CPT | Mod: ,,, | Performed by: CLINICAL MEDICAL LABORATORY

## 2023-02-21 PROCEDURE — 3008F BODY MASS INDEX DOCD: CPT | Mod: ,,, | Performed by: FAMILY MEDICINE

## 2023-02-21 PROCEDURE — 3288F PR FALLS RISK ASSESSMENT DOCUMENTED: ICD-10-PCS | Mod: ,,, | Performed by: FAMILY MEDICINE

## 2023-02-21 PROCEDURE — 1159F MED LIST DOCD IN RCRD: CPT | Mod: ,,, | Performed by: FAMILY MEDICINE

## 2023-02-21 PROCEDURE — 85025 COMPLETE CBC W/AUTO DIFF WBC: CPT | Mod: ,,, | Performed by: CLINICAL MEDICAL LABORATORY

## 2023-02-21 PROCEDURE — 83036 HEMOGLOBIN A1C: ICD-10-PCS | Mod: ,,, | Performed by: CLINICAL MEDICAL LABORATORY

## 2023-02-21 PROCEDURE — 3008F PR BODY MASS INDEX (BMI) DOCUMENTED: ICD-10-PCS | Mod: ,,, | Performed by: FAMILY MEDICINE

## 2023-02-21 PROCEDURE — 82607 VITAMIN B-12: CPT | Mod: ,,, | Performed by: CLINICAL MEDICAL LABORATORY

## 2023-02-21 PROCEDURE — 1160F RVW MEDS BY RX/DR IN RCRD: CPT | Mod: ,,, | Performed by: FAMILY MEDICINE

## 2023-02-21 PROCEDURE — 82607 VITAMIN B12: ICD-10-PCS | Mod: ,,, | Performed by: CLINICAL MEDICAL LABORATORY

## 2023-02-21 PROCEDURE — 3078F PR MOST RECENT DIASTOLIC BLOOD PRESSURE < 80 MM HG: ICD-10-PCS | Mod: ,,, | Performed by: FAMILY MEDICINE

## 2023-02-21 PROCEDURE — 85025 CBC WITH DIFFERENTIAL: ICD-10-PCS | Mod: ,,, | Performed by: CLINICAL MEDICAL LABORATORY

## 2023-02-21 PROCEDURE — 1159F PR MEDICATION LIST DOCUMENTED IN MEDICAL RECORD: ICD-10-PCS | Mod: ,,, | Performed by: FAMILY MEDICINE

## 2023-02-21 PROCEDURE — 36415 PR COLLECTION VENOUS BLOOD,VENIPUNCTURE: ICD-10-PCS | Mod: ,,, | Performed by: CLINICAL MEDICAL LABORATORY

## 2023-02-21 PROCEDURE — 3078F DIAST BP <80 MM HG: CPT | Mod: ,,, | Performed by: FAMILY MEDICINE

## 2023-02-21 PROCEDURE — 3077F SYST BP >= 140 MM HG: CPT | Mod: ,,, | Performed by: FAMILY MEDICINE

## 2023-02-21 PROCEDURE — 3077F PR MOST RECENT SYSTOLIC BLOOD PRESSURE >= 140 MM HG: ICD-10-PCS | Mod: ,,, | Performed by: FAMILY MEDICINE

## 2023-02-21 PROCEDURE — 3288F FALL RISK ASSESSMENT DOCD: CPT | Mod: ,,, | Performed by: FAMILY MEDICINE

## 2023-02-21 PROCEDURE — 83036 HEMOGLOBIN GLYCOSYLATED A1C: CPT | Mod: ,,, | Performed by: CLINICAL MEDICAL LABORATORY

## 2023-02-21 PROCEDURE — 80053 COMPREHEN METABOLIC PANEL: CPT | Mod: ,,, | Performed by: CLINICAL MEDICAL LABORATORY

## 2023-02-21 PROCEDURE — 80053 COMPREHENSIVE METABOLIC PANEL: ICD-10-PCS | Mod: ,,, | Performed by: CLINICAL MEDICAL LABORATORY

## 2023-02-21 PROCEDURE — 99214 PR OFFICE/OUTPT VISIT, EST, LEVL IV, 30-39 MIN: ICD-10-PCS | Mod: ,,, | Performed by: FAMILY MEDICINE

## 2023-02-21 PROCEDURE — 1160F PR REVIEW ALL MEDS BY PRESCRIBER/CLIN PHARMACIST DOCUMENTED: ICD-10-PCS | Mod: ,,, | Performed by: FAMILY MEDICINE

## 2023-02-21 PROCEDURE — 1101F PT FALLS ASSESS-DOCD LE1/YR: CPT | Mod: ,,, | Performed by: FAMILY MEDICINE

## 2023-02-21 RX ORDER — BACLOFEN 5 MG/1
5 TABLET ORAL NIGHTLY
Qty: 30 TABLET | Refills: 1 | Status: SHIPPED | OUTPATIENT
Start: 2023-02-21 | End: 2023-04-04 | Stop reason: SDUPTHER

## 2023-02-21 NOTE — PROGRESS NOTES
"Davon Barrios is a 72 y.o. male seen today for follow-up on his diabetes.  Currently, patient is complaining of worsening nightmares and difficulty sleeping.  Patient feels as if he has been attacked by drug dealers wise drawn asleep.  We discussed a trial of baclofen at night to see if we can help him sleep and reverse the symptoms.      Past Medical History:   Diagnosis Date    Carcinoma of prostate     Hypertension     Sleep apnea     Type 2 diabetes mellitus     Vitamin B 12 deficiency(non anemic)      Family History   Problem Relation Age of Onset    Diabetes Mother     Rectal cancer Father     Diabetes Father     Hypertension Father     Lung cancer Father     Hypertension Sister     Hypertension Brother      Current Outpatient Medications on File Prior to Visit   Medication Sig Dispense Refill    ADULT LOW DOSE ASPIRIN ORAL Take 81 mg by mouth once daily.      ascorbic Acid (VITAMIN C) 500 mg CpSR Take 500 mg by mouth once daily.      atorvastatin (LIPITOR) 10 MG tablet Take 1 tablet (10 mg total) by mouth once daily. 90 tablet 1    carvediloL (COREG) 3.125 MG tablet TAKE 1 TABLET BY MOUTH TWICE A DAY AS DIRECTED 90 tablet 1    citalopram (CELEXA) 20 MG tablet TAKE 1 TABLET (20 MG TOTAL) BY MOUTH ONCE DAILY. 90 tablet 1    dapagliflozin (FARXIGA) 10 mg tablet Take 1 tablet (10 mg total) by mouth once daily. 30 tablet 5    enalapril (VASOTEC) 20 MG tablet TAKE 1 TABLET BY MOUTH ONCE A DAY AS DIRECTED 90 tablet 0    gabapentin (NEURONTIN) 300 MG capsule Take 1 capsule (300 mg total) by mouth 3 (three) times daily. (Patient taking differently: Take 3 capsules by mouth every morning and take 2 capsules by mouth every evening) 270 capsule 1    insulin NPH-insulin regular, 70/30, (NOVOLIN 70/30) 100 unit/mL (70-30) injection Inject 42 Units into the skin 2 (two) times daily.      insulin syringe-needle U-100 (BD INSULIN SYRINGE) 1 mL 25 gauge x 5/8" Syrg BD Insulin Syringe 1 mL 25 gauge x 5/8"      latanoprost " 0.005 % ophthalmic solution latanoprost 0.005 % eye drops      vitamin E 100 UNIT capsule Take 100 capsules by mouth once daily.      [DISCONTINUED] cyclobenzaprine (FLEXERIL) 10 MG tablet Take 1 tablet (10 mg total) by mouth 2 (two) times a day. 180 tablet 1    [DISCONTINUED] ergocalciferol (ERGOCALCIFEROL) 50,000 unit Cap 1 capsule      [DISCONTINUED] folic acid (FOLVITE) 1 MG tablet Take 1 tablet (1 mg total) by mouth once daily. (Patient not taking: Reported on 2/21/2023) 90 tablet 1    [DISCONTINUED] furosemide (LASIX) 20 MG tablet Take 1 tablet (20 mg total) by mouth once daily. (Patient not taking: Reported on 2/21/2023) 90 tablet 1    [DISCONTINUED] vitamin E 1000 UNIT capsule Take 1,000 Units by mouth once daily.       No current facility-administered medications on file prior to visit.     Immunization History   Administered Date(s) Administered    COVID-19, MRNA, LN-S, PF (MODERNA FULL 0.5 ML DOSE) 03/05/2021, 04/06/2021, 11/19/2021    Influenza - High Dose - PF (65 years and older) 03/01/2021    Influenza - Quadrivalent - PF *Preferred* (6 months and older) 09/16/2021    Influenza Split 01/01/2015    Pneumococcal Conjugate - 13 Valent 03/01/2021    Pneumococcal Polysaccharide - 23 Valent 09/03/2014       Review of Systems   Constitutional:  Negative for fever, malaise/fatigue and weight loss.   Respiratory:  Negative for shortness of breath.    Cardiovascular:  Negative for chest pain and palpitations.   Gastrointestinal:  Negative for nausea and vomiting.   Psychiatric/Behavioral:  Positive for hallucinations. Negative for depression. The patient has insomnia.       Vitals:    02/21/23 1623   BP: (!) 144/68   Pulse: 70   Resp: 19       Physical Exam  Vitals reviewed.   Constitutional:       Appearance: Normal appearance.   HENT:      Head: Normocephalic.   Eyes:      Extraocular Movements: Extraocular movements intact.      Conjunctiva/sclera: Conjunctivae normal.      Pupils: Pupils are equal, round,  and reactive to light.   Neck:      Thyroid: No thyroid mass or thyromegaly.   Cardiovascular:      Rate and Rhythm: Normal rate and regular rhythm.      Heart sounds: Normal heart sounds. No murmur heard.    No gallop.   Pulmonary:      Effort: Pulmonary effort is normal. No respiratory distress.      Breath sounds: Normal breath sounds. No wheezing or rales.   Skin:     General: Skin is warm and dry.      Coloration: Skin is not jaundiced or pale.   Neurological:      Mental Status: He is alert.   Psychiatric:         Mood and Affect: Mood normal.         Behavior: Behavior normal.         Thought Content: Thought content normal.         Judgment: Judgment normal.        Assessment and Plan  Nightmares  -     baclofen (LIORESAL) 5 mg Tab tablet; Take 1 tablet (5 mg total) by mouth every evening.  Dispense: 30 tablet; Refill: 1    Type 2 diabetes mellitus with microalbuminuria, with long-term current use of insulin  -     CBC Auto Differential; Future; Expected date: 02/21/2023  -     Comprehensive Metabolic Panel; Future; Expected date: 02/21/2023  -     Hemoglobin A1C; Future; Expected date: 02/21/2023    B12 deficiency  -     Vitamin B12; Future; Expected date: 02/21/2023            Return to clinic in 1 month    Health Maintenance Topics with due status: Not Due       Topic Last Completion Date    Colorectal Cancer Screening 01/19/2022    Eye Exam 08/09/2022    Diabetes Urine Screening 08/16/2022    Foot Exam 08/16/2022    Low Dose Statin 02/21/2023

## 2023-02-22 LAB
ALBUMIN SERPL BCP-MCNC: 3.6 G/DL (ref 3.5–5)
ALBUMIN/GLOB SERPL: 1.1 {RATIO}
ALP SERPL-CCNC: 96 U/L (ref 45–115)
ALT SERPL W P-5'-P-CCNC: 21 U/L (ref 16–61)
ANION GAP SERPL CALCULATED.3IONS-SCNC: 10 MMOL/L (ref 7–16)
AST SERPL W P-5'-P-CCNC: 16 U/L (ref 15–37)
BASOPHILS # BLD AUTO: 0.02 K/UL (ref 0–0.2)
BASOPHILS NFR BLD AUTO: 0.2 % (ref 0–1)
BILIRUB SERPL-MCNC: 0.3 MG/DL (ref ?–1.2)
BUN SERPL-MCNC: 32 MG/DL (ref 7–18)
BUN/CREAT SERPL: 17 (ref 6–20)
CALCIUM SERPL-MCNC: 9.1 MG/DL (ref 8.5–10.1)
CHLORIDE SERPL-SCNC: 106 MMOL/L (ref 98–107)
CO2 SERPL-SCNC: 26 MMOL/L (ref 21–32)
CREAT SERPL-MCNC: 1.85 MG/DL (ref 0.7–1.3)
DIFFERENTIAL METHOD BLD: ABNORMAL
EGFR (NO RACE VARIABLE) (RUSH/TITUS): 38 ML/MIN/1.73M²
EOSINOPHIL # BLD AUTO: 0.09 K/UL (ref 0–0.5)
EOSINOPHIL NFR BLD AUTO: 1.1 % (ref 1–4)
ERYTHROCYTE [DISTWIDTH] IN BLOOD BY AUTOMATED COUNT: 13.2 % (ref 11.5–14.5)
EST. AVERAGE GLUCOSE BLD GHB EST-MCNC: 314 MG/DL
GLOBULIN SER-MCNC: 3.2 G/DL (ref 2–4)
GLUCOSE SERPL-MCNC: 293 MG/DL (ref 74–106)
HBA1C MFR BLD HPLC: 12 % (ref 4.5–6.6)
HCT VFR BLD AUTO: 42.1 % (ref 40–54)
HGB BLD-MCNC: 13 G/DL (ref 13.5–18)
IMM GRANULOCYTES # BLD AUTO: 0.03 K/UL (ref 0–0.04)
IMM GRANULOCYTES NFR BLD: 0.4 % (ref 0–0.4)
LYMPHOCYTES # BLD AUTO: 2.22 K/UL (ref 1–4.8)
LYMPHOCYTES NFR BLD AUTO: 26.2 % (ref 27–41)
MCH RBC QN AUTO: 30 PG (ref 27–31)
MCHC RBC AUTO-ENTMCNC: 30.9 G/DL (ref 32–36)
MCV RBC AUTO: 97 FL (ref 80–96)
MONOCYTES # BLD AUTO: 0.86 K/UL (ref 0–0.8)
MONOCYTES NFR BLD AUTO: 10.2 % (ref 2–6)
MPC BLD CALC-MCNC: 11.1 FL (ref 9.4–12.4)
NEUTROPHILS # BLD AUTO: 5.25 K/UL (ref 1.8–7.7)
NEUTROPHILS NFR BLD AUTO: 61.9 % (ref 53–65)
NRBC # BLD AUTO: 0 X10E3/UL
NRBC, AUTO (.00): 0 %
PLATELET # BLD AUTO: 212 K/UL (ref 150–400)
POTASSIUM SERPL-SCNC: 5 MMOL/L (ref 3.5–5.1)
PROT SERPL-MCNC: 6.8 G/DL (ref 6.4–8.2)
RBC # BLD AUTO: 4.34 M/UL (ref 4.6–6.2)
SODIUM SERPL-SCNC: 137 MMOL/L (ref 136–145)
VIT B12 SERPL-MCNC: 414 PG/ML (ref 193–986)
WBC # BLD AUTO: 8.47 K/UL (ref 4.5–11)

## 2023-02-24 ENCOUNTER — TELEPHONE (OUTPATIENT)
Dept: FAMILY MEDICINE | Facility: CLINIC | Age: 73
End: 2023-02-24
Payer: MEDICARE

## 2023-02-24 DIAGNOSIS — D64.9 ANEMIA, UNSPECIFIED TYPE: Primary | ICD-10-CM

## 2023-02-24 NOTE — LETTER
February 24, 2023    Davon Barrios  [address] [address] [facility] [place] [address]             [facility]  [address] [place] [address] [place] [address]  Phone: [phone]  Fax: [phone] Dear Mr. Barrios:    Please contact the clinic to discuss your recent lab results.           Sincerely,      Debbie Florian lpn

## 2023-02-24 NOTE — TELEPHONE ENCOUNTER
----- Message from Oc Song MD sent at 2/23/2023  7:52 AM CST -----  Please add iron studies.  Office visit for diabetes and renal function.

## 2023-02-24 NOTE — TELEPHONE ENCOUNTER
Ms Munroe attempted, no answer, left voicemail to return call, will send letter. Iron studies added. Pt has follow up scheduled.

## 2023-03-20 DIAGNOSIS — R80.9 TYPE 2 DIABETES MELLITUS WITH MICROALBUMINURIA, WITH LONG-TERM CURRENT USE OF INSULIN: ICD-10-CM

## 2023-03-20 DIAGNOSIS — Z79.4 TYPE 2 DIABETES MELLITUS WITH MICROALBUMINURIA, WITH LONG-TERM CURRENT USE OF INSULIN: ICD-10-CM

## 2023-03-20 DIAGNOSIS — E11.29 TYPE 2 DIABETES MELLITUS WITH MICROALBUMINURIA, WITH LONG-TERM CURRENT USE OF INSULIN: ICD-10-CM

## 2023-03-20 RX ORDER — DAPAGLIFLOZIN 10 MG/1
10 TABLET, FILM COATED ORAL DAILY
Qty: 30 TABLET | Refills: 0 | Status: SHIPPED | OUTPATIENT
Start: 2023-03-20 | End: 2023-04-04 | Stop reason: SDUPTHER

## 2023-03-20 NOTE — TELEPHONE ENCOUNTER
----- Message from Radha Eisenberg MA sent at 3/20/2023  2:17 PM CDT -----  Please call in farxiga 30 day supply to [facility] in [place].

## 2023-03-30 ENCOUNTER — PATIENT OUTREACH (OUTPATIENT)
Dept: FAMILY MEDICINE | Facility: CLINIC | Age: 73
End: 2023-03-30
Payer: MEDICARE

## 2023-04-04 ENCOUNTER — OFFICE VISIT (OUTPATIENT)
Dept: FAMILY MEDICINE | Facility: CLINIC | Age: 73
End: 2023-04-04
Payer: MEDICARE

## 2023-04-04 VITALS
OXYGEN SATURATION: 96 % | HEIGHT: 70 IN | SYSTOLIC BLOOD PRESSURE: 136 MMHG | RESPIRATION RATE: 18 BRPM | DIASTOLIC BLOOD PRESSURE: 64 MMHG | TEMPERATURE: 98 F | HEART RATE: 74 BPM | WEIGHT: 262 LBS | BODY MASS INDEX: 37.51 KG/M2

## 2023-04-04 DIAGNOSIS — D64.9 ANEMIA, UNSPECIFIED TYPE: Primary | ICD-10-CM

## 2023-04-04 DIAGNOSIS — Z79.4 TYPE 2 DIABETES MELLITUS WITH MICROALBUMINURIA, WITH LONG-TERM CURRENT USE OF INSULIN: ICD-10-CM

## 2023-04-04 DIAGNOSIS — R80.9 TYPE 2 DIABETES MELLITUS WITH MICROALBUMINURIA, WITH LONG-TERM CURRENT USE OF INSULIN: ICD-10-CM

## 2023-04-04 DIAGNOSIS — E11.29 TYPE 2 DIABETES MELLITUS WITH MICROALBUMINURIA, WITH LONG-TERM CURRENT USE OF INSULIN: ICD-10-CM

## 2023-04-04 DIAGNOSIS — F51.5 NIGHTMARES: ICD-10-CM

## 2023-04-04 LAB
FERRITIN SERPL-MCNC: 59 NG/ML (ref 26–388)
IRON SATN MFR SERPL: 20 % (ref 14–50)
IRON SERPL-MCNC: 65 ΜG/DL (ref 65–175)
TIBC SERPL-MCNC: 324 ΜG/DL (ref 250–450)

## 2023-04-04 PROCEDURE — 1126F AMNT PAIN NOTED NONE PRSNT: CPT | Mod: ,,, | Performed by: FAMILY MEDICINE

## 2023-04-04 PROCEDURE — 1101F PT FALLS ASSESS-DOCD LE1/YR: CPT | Mod: ,,, | Performed by: FAMILY MEDICINE

## 2023-04-04 PROCEDURE — 3046F HEMOGLOBIN A1C LEVEL >9.0%: CPT | Mod: ,,, | Performed by: FAMILY MEDICINE

## 2023-04-04 PROCEDURE — 3288F PR FALLS RISK ASSESSMENT DOCUMENTED: ICD-10-PCS | Mod: ,,, | Performed by: FAMILY MEDICINE

## 2023-04-04 PROCEDURE — 1159F PR MEDICATION LIST DOCUMENTED IN MEDICAL RECORD: ICD-10-PCS | Mod: ,,, | Performed by: FAMILY MEDICINE

## 2023-04-04 PROCEDURE — 3288F FALL RISK ASSESSMENT DOCD: CPT | Mod: ,,, | Performed by: FAMILY MEDICINE

## 2023-04-04 PROCEDURE — 99213 OFFICE O/P EST LOW 20 MIN: CPT | Mod: ,,, | Performed by: FAMILY MEDICINE

## 2023-04-04 PROCEDURE — 3078F DIAST BP <80 MM HG: CPT | Mod: ,,, | Performed by: FAMILY MEDICINE

## 2023-04-04 PROCEDURE — 83550 IRON BINDING TEST: CPT | Mod: ,,, | Performed by: CLINICAL MEDICAL LABORATORY

## 2023-04-04 PROCEDURE — 3075F PR MOST RECENT SYSTOLIC BLOOD PRESS GE 130-139MM HG: ICD-10-PCS | Mod: ,,, | Performed by: FAMILY MEDICINE

## 2023-04-04 PROCEDURE — 1160F PR REVIEW ALL MEDS BY PRESCRIBER/CLIN PHARMACIST DOCUMENTED: ICD-10-PCS | Mod: ,,, | Performed by: FAMILY MEDICINE

## 2023-04-04 PROCEDURE — 1101F PR PT FALLS ASSESS DOC 0-1 FALLS W/OUT INJ PAST YR: ICD-10-PCS | Mod: ,,, | Performed by: FAMILY MEDICINE

## 2023-04-04 PROCEDURE — 3046F PR MOST RECENT HEMOGLOBIN A1C LEVEL > 9.0%: ICD-10-PCS | Mod: ,,, | Performed by: FAMILY MEDICINE

## 2023-04-04 PROCEDURE — 1159F MED LIST DOCD IN RCRD: CPT | Mod: ,,, | Performed by: FAMILY MEDICINE

## 2023-04-04 PROCEDURE — 82728 FERRITIN: ICD-10-PCS | Mod: ,,, | Performed by: CLINICAL MEDICAL LABORATORY

## 2023-04-04 PROCEDURE — 3075F SYST BP GE 130 - 139MM HG: CPT | Mod: ,,, | Performed by: FAMILY MEDICINE

## 2023-04-04 PROCEDURE — 82728 ASSAY OF FERRITIN: CPT | Mod: ,,, | Performed by: CLINICAL MEDICAL LABORATORY

## 2023-04-04 PROCEDURE — 1160F RVW MEDS BY RX/DR IN RCRD: CPT | Mod: ,,, | Performed by: FAMILY MEDICINE

## 2023-04-04 PROCEDURE — 83540 IRON AND TIBC: ICD-10-PCS | Mod: ,,, | Performed by: CLINICAL MEDICAL LABORATORY

## 2023-04-04 PROCEDURE — 3078F PR MOST RECENT DIASTOLIC BLOOD PRESSURE < 80 MM HG: ICD-10-PCS | Mod: ,,, | Performed by: FAMILY MEDICINE

## 2023-04-04 PROCEDURE — 83540 ASSAY OF IRON: CPT | Mod: ,,, | Performed by: CLINICAL MEDICAL LABORATORY

## 2023-04-04 PROCEDURE — 4010F ACE/ARB THERAPY RXD/TAKEN: CPT | Mod: ,,, | Performed by: FAMILY MEDICINE

## 2023-04-04 PROCEDURE — 3008F PR BODY MASS INDEX (BMI) DOCUMENTED: ICD-10-PCS | Mod: ,,, | Performed by: FAMILY MEDICINE

## 2023-04-04 PROCEDURE — 99213 PR OFFICE/OUTPT VISIT, EST, LEVL III, 20-29 MIN: ICD-10-PCS | Mod: ,,, | Performed by: FAMILY MEDICINE

## 2023-04-04 PROCEDURE — 3008F BODY MASS INDEX DOCD: CPT | Mod: ,,, | Performed by: FAMILY MEDICINE

## 2023-04-04 PROCEDURE — 1126F PR PAIN SEVERITY QUANTIFIED, NO PAIN PRESENT: ICD-10-PCS | Mod: ,,, | Performed by: FAMILY MEDICINE

## 2023-04-04 PROCEDURE — 83550 IRON AND TIBC: ICD-10-PCS | Mod: ,,, | Performed by: CLINICAL MEDICAL LABORATORY

## 2023-04-04 PROCEDURE — 4010F PR ACE/ARB THEARPY RXD/TAKEN: ICD-10-PCS | Mod: ,,, | Performed by: FAMILY MEDICINE

## 2023-04-04 RX ORDER — BACLOFEN 5 MG/1
5 TABLET ORAL NIGHTLY
Qty: 90 TABLET | Refills: 1 | Status: SHIPPED | OUTPATIENT
Start: 2023-04-04 | End: 2023-05-10 | Stop reason: SDUPTHER

## 2023-04-04 RX ORDER — DAPAGLIFLOZIN 10 MG/1
10 TABLET, FILM COATED ORAL DAILY
Qty: 30 TABLET | Refills: 5 | Status: SHIPPED | OUTPATIENT
Start: 2023-04-04 | End: 2023-04-25 | Stop reason: SDUPTHER

## 2023-04-04 NOTE — PROGRESS NOTES
Davon Barrios is a 72 y.o. male seen today for follow-up on his diabetes.  Unfortunately his A1c was 12 although his wife reports that when he eats appropriately his blood sugars well controlled.  We discussed a strict ADA diet and hopefully this will be enough to keep his A1c to goal.  Patient also has had a bump in his creatinine and since he only has 1 kidney I reinforced with him the critical nature of controlling his diabetes.  Patient reports the baclofen is working well with his nightmares.      Past Medical History:   Diagnosis Date    Carcinoma of prostate     Hypertension     Sleep apnea     Type 2 diabetes mellitus     Vitamin B 12 deficiency(non anemic)      Family History   Problem Relation Age of Onset    Diabetes Mother     Rectal cancer Father     Diabetes Father     Hypertension Father     Lung cancer Father     Hypertension Sister     Hypertension Brother      Current Outpatient Medications on File Prior to Visit   Medication Sig Dispense Refill    ADULT LOW DOSE ASPIRIN ORAL Take 81 mg by mouth once daily.      ascorbic Acid (VITAMIN C) 500 mg CpSR Take 500 mg by mouth once daily.      atorvastatin (LIPITOR) 10 MG tablet TAKE 1 TABLET BY MOUTH ONCE A DAY AS DIRECTED 90 tablet 1    carvediloL (COREG) 3.125 MG tablet TAKE 1 TABLET BY MOUTH TWICE A DAY AS DIRECTED 90 tablet 1    citalopram (CELEXA) 20 MG tablet TAKE 1 TABLET (20 MG TOTAL) BY MOUTH ONCE DAILY. 90 tablet 1    dapagliflozin (FARXIGA) 10 mg tablet Take 1 tablet (10 mg total) by mouth once daily. 30 tablet 0    enalapril (VASOTEC) 20 MG tablet TAKE 1 TABLET BY MOUTH ONCE A DAY AS DIRECTED 90 tablet 0    gabapentin (NEURONTIN) 300 MG capsule Take 1 capsule (300 mg total) by mouth 3 (three) times daily. (Patient taking differently: Take 3 capsules by mouth every morning and take 2 capsules by mouth every evening) 270 capsule 1    insulin NPH-insulin regular, 70/30, (NOVOLIN 70/30) 100 unit/mL (70-30) injection Inject 42 Units into the  "skin 2 (two) times daily.      insulin syringe-needle U-100 (BD INSULIN SYRINGE) 1 mL 25 gauge x 5/8" Syrg BD Insulin Syringe 1 mL 25 gauge x 5/8"      latanoprost 0.005 % ophthalmic solution latanoprost 0.005 % eye drops      vitamin E 100 UNIT capsule Take 100 capsules by mouth once daily.      [DISCONTINUED] baclofen (LIORESAL) 5 mg Tab tablet Take 1 tablet (5 mg total) by mouth every evening. 30 tablet 1     No current facility-administered medications on file prior to visit.     Immunization History   Administered Date(s) Administered    COVID-19, MRNA, LN-S, PF (MODERNA FULL 0.5 ML DOSE) 03/05/2021, 04/06/2021, 11/19/2021    Influenza - High Dose - PF (65 years and older) 03/01/2021    Influenza - Quadrivalent - PF *Preferred* (6 months and older) 09/16/2021    Influenza Split 01/01/2015    Pneumococcal Conjugate - 13 Valent 03/01/2021    Pneumococcal Polysaccharide - 23 Valent 09/03/2014       Review of Systems   Constitutional:  Negative for fever, malaise/fatigue and weight loss.   Respiratory:  Negative for shortness of breath.    Cardiovascular:  Negative for chest pain and palpitations.   Gastrointestinal:  Negative for nausea and vomiting.   Psychiatric/Behavioral:  Negative for depression.       Vitals:    04/04/23 1438   BP: 136/64   Pulse: 74   Resp: 18   Temp: 98.2 °F (36.8 °C)       Physical Exam  Vitals reviewed.   Constitutional:       Appearance: Normal appearance.   HENT:      Head: Normocephalic.   Eyes:      Extraocular Movements: Extraocular movements intact.      Conjunctiva/sclera: Conjunctivae normal.      Pupils: Pupils are equal, round, and reactive to light.   Neck:      Thyroid: No thyroid mass or thyromegaly.   Cardiovascular:      Rate and Rhythm: Normal rate and regular rhythm.      Heart sounds: Normal heart sounds. No murmur heard.    No gallop.   Pulmonary:      Effort: Pulmonary effort is normal. No respiratory distress.      Breath sounds: Normal breath sounds. No wheezing or " rales.   Feet:      Right foot:      Protective Sensation: 10 sites tested.  10 sites sensed.      Skin integrity: Callus present. No ulcer or blister.      Left foot:      Protective Sensation: 10 sites tested.  10 sites sensed.      Skin integrity: Callus present. No ulcer or blister.   Skin:     General: Skin is warm and dry.      Coloration: Skin is not jaundiced or pale.   Neurological:      Mental Status: He is alert.   Psychiatric:         Mood and Affect: Mood normal.         Behavior: Behavior normal.         Thought Content: Thought content normal.         Judgment: Judgment normal.        Assessment and Plan  Anemia, unspecified type  -     Iron and TIBC; Future; Expected date: 04/04/2023  -     Ferritin; Future; Expected date: 04/04/2023  -     Iron and TIBC  -     Ferritin    Nightmares  -     baclofen (LIORESAL) 5 mg Tab tablet; Take 1 tablet (5 mg total) by mouth every evening.  Dispense: 90 tablet; Refill: 1            Return to clinic in 1 month with home glucose log book.    Health Maintenance Topics with due status: Not Due       Topic Last Completion Date    Colorectal Cancer Screening 01/19/2022    Eye Exam 08/09/2022    Diabetes Urine Screening 08/16/2022    Foot Exam 08/16/2022    Hemoglobin A1c 02/21/2023    Low Dose Statin 04/04/2023

## 2023-04-05 ENCOUNTER — TELEPHONE (OUTPATIENT)
Dept: FAMILY MEDICINE | Facility: CLINIC | Age: 73
End: 2023-04-05
Payer: MEDICARE

## 2023-04-05 NOTE — LETTER
April 5, 2023    Davon Barrios  [address] [facility] [place] [address]             [facility]  [address] [place] [address] [place] [address]  Phone: [phone]  Fax: [phone] Dear Shireen Denis:    Your recent iron labs were resulted as normal. Dr Song suggest anemia of chronic diease or low values resulting from your declining kidney function. No supplementation is needed for this.       If you have any questions or concerns, please don't hesitate to call.    Sincerely,      Debbie Florian lpn

## 2023-04-05 NOTE — TELEPHONE ENCOUNTER
----- Message from Oc Song MD sent at 4/5/2023  7:53 AM CDT -----  Iron studies are normal so his anemia is likely of chronic disease or possibly related to declining renal function.

## 2023-04-06 ENCOUNTER — TELEPHONE (OUTPATIENT)
Dept: FAMILY MEDICINE | Facility: CLINIC | Age: 73
End: 2023-04-06
Payer: MEDICARE

## 2023-04-06 NOTE — TELEPHONE ENCOUNTER
Ms Munroe contacted clinic regarding patients blood glucose level. Reports glucose of 58 late in the night, reports the freestyle sol notification awakes them. Reports patient had salmon and broccoli for supper with a p3 packet prior. Reports patient had sandwich, small tomatoes and ritz peanut butter crackers for snack for lunch yesterday and his blood glucose was within the low 100's about 2 hours after eating. Report for breakfast this morning patient had a bowl of cheerios with small amount of blueberries with glucose level of 200 2 hours after eating. Reports no s\s of hypoglycemia. Dr Song consulted. Advised to decrease night time insulin dosage to 38 units with reports of glucose levels. Reports if sugar continues to drop throughout the night he will keep decreasing nighttime insulin dosage. Ms Munroe notified and verbalized understanding. Insulin sig changed on current medication list.

## 2023-04-25 DIAGNOSIS — E11.29 TYPE 2 DIABETES MELLITUS WITH MICROALBUMINURIA, WITH LONG-TERM CURRENT USE OF INSULIN: ICD-10-CM

## 2023-04-25 DIAGNOSIS — R80.9 TYPE 2 DIABETES MELLITUS WITH MICROALBUMINURIA, WITH LONG-TERM CURRENT USE OF INSULIN: ICD-10-CM

## 2023-04-25 DIAGNOSIS — Z79.4 TYPE 2 DIABETES MELLITUS WITH MICROALBUMINURIA, WITH LONG-TERM CURRENT USE OF INSULIN: ICD-10-CM

## 2023-04-25 RX ORDER — DAPAGLIFLOZIN 10 MG/1
10 TABLET, FILM COATED ORAL DAILY
Qty: 30 TABLET | Refills: 5 | Status: SHIPPED | OUTPATIENT
Start: 2023-04-25 | End: 2023-09-27 | Stop reason: SDUPTHER

## 2023-05-09 DIAGNOSIS — Z71.89 COMPLEX CARE COORDINATION: ICD-10-CM

## 2023-05-10 ENCOUNTER — OFFICE VISIT (OUTPATIENT)
Dept: FAMILY MEDICINE | Facility: CLINIC | Age: 73
End: 2023-05-10
Payer: MEDICARE

## 2023-05-10 VITALS
SYSTOLIC BLOOD PRESSURE: 118 MMHG | BODY MASS INDEX: 37.56 KG/M2 | OXYGEN SATURATION: 99 % | TEMPERATURE: 98 F | HEART RATE: 66 BPM | DIASTOLIC BLOOD PRESSURE: 60 MMHG | HEIGHT: 70 IN | RESPIRATION RATE: 18 BRPM | WEIGHT: 262.38 LBS

## 2023-05-10 DIAGNOSIS — I10 HYPERTENSION, UNSPECIFIED TYPE: ICD-10-CM

## 2023-05-10 DIAGNOSIS — F51.5 NIGHTMARES: Primary | ICD-10-CM

## 2023-05-10 PROCEDURE — 4010F ACE/ARB THERAPY RXD/TAKEN: CPT | Mod: ,,, | Performed by: FAMILY MEDICINE

## 2023-05-10 PROCEDURE — 3074F PR MOST RECENT SYSTOLIC BLOOD PRESSURE < 130 MM HG: ICD-10-PCS | Mod: ,,, | Performed by: FAMILY MEDICINE

## 2023-05-10 PROCEDURE — 3288F PR FALLS RISK ASSESSMENT DOCUMENTED: ICD-10-PCS | Mod: ,,, | Performed by: FAMILY MEDICINE

## 2023-05-10 PROCEDURE — 1160F RVW MEDS BY RX/DR IN RCRD: CPT | Mod: ,,, | Performed by: FAMILY MEDICINE

## 2023-05-10 PROCEDURE — 3046F PR MOST RECENT HEMOGLOBIN A1C LEVEL > 9.0%: ICD-10-PCS | Mod: ,,, | Performed by: FAMILY MEDICINE

## 2023-05-10 PROCEDURE — 1160F PR REVIEW ALL MEDS BY PRESCRIBER/CLIN PHARMACIST DOCUMENTED: ICD-10-PCS | Mod: ,,, | Performed by: FAMILY MEDICINE

## 2023-05-10 PROCEDURE — 3078F PR MOST RECENT DIASTOLIC BLOOD PRESSURE < 80 MM HG: ICD-10-PCS | Mod: ,,, | Performed by: FAMILY MEDICINE

## 2023-05-10 PROCEDURE — 1126F AMNT PAIN NOTED NONE PRSNT: CPT | Mod: ,,, | Performed by: FAMILY MEDICINE

## 2023-05-10 PROCEDURE — 3078F DIAST BP <80 MM HG: CPT | Mod: ,,, | Performed by: FAMILY MEDICINE

## 2023-05-10 PROCEDURE — 3008F PR BODY MASS INDEX (BMI) DOCUMENTED: ICD-10-PCS | Mod: ,,, | Performed by: FAMILY MEDICINE

## 2023-05-10 PROCEDURE — 99213 OFFICE O/P EST LOW 20 MIN: CPT | Mod: ,,, | Performed by: FAMILY MEDICINE

## 2023-05-10 PROCEDURE — 3288F FALL RISK ASSESSMENT DOCD: CPT | Mod: ,,, | Performed by: FAMILY MEDICINE

## 2023-05-10 PROCEDURE — 1101F PR PT FALLS ASSESS DOC 0-1 FALLS W/OUT INJ PAST YR: ICD-10-PCS | Mod: ,,, | Performed by: FAMILY MEDICINE

## 2023-05-10 PROCEDURE — 3008F BODY MASS INDEX DOCD: CPT | Mod: ,,, | Performed by: FAMILY MEDICINE

## 2023-05-10 PROCEDURE — 3046F HEMOGLOBIN A1C LEVEL >9.0%: CPT | Mod: ,,, | Performed by: FAMILY MEDICINE

## 2023-05-10 PROCEDURE — 99213 PR OFFICE/OUTPT VISIT, EST, LEVL III, 20-29 MIN: ICD-10-PCS | Mod: ,,, | Performed by: FAMILY MEDICINE

## 2023-05-10 PROCEDURE — 1126F PR PAIN SEVERITY QUANTIFIED, NO PAIN PRESENT: ICD-10-PCS | Mod: ,,, | Performed by: FAMILY MEDICINE

## 2023-05-10 PROCEDURE — 4010F PR ACE/ARB THEARPY RXD/TAKEN: ICD-10-PCS | Mod: ,,, | Performed by: FAMILY MEDICINE

## 2023-05-10 PROCEDURE — 3074F SYST BP LT 130 MM HG: CPT | Mod: ,,, | Performed by: FAMILY MEDICINE

## 2023-05-10 PROCEDURE — 1159F PR MEDICATION LIST DOCUMENTED IN MEDICAL RECORD: ICD-10-PCS | Mod: ,,, | Performed by: FAMILY MEDICINE

## 2023-05-10 PROCEDURE — 1159F MED LIST DOCD IN RCRD: CPT | Mod: ,,, | Performed by: FAMILY MEDICINE

## 2023-05-10 PROCEDURE — 1101F PT FALLS ASSESS-DOCD LE1/YR: CPT | Mod: ,,, | Performed by: FAMILY MEDICINE

## 2023-05-10 RX ORDER — ENALAPRIL MALEATE 20 MG/1
20 TABLET ORAL DAILY
Qty: 90 TABLET | Refills: 1 | Status: SHIPPED | OUTPATIENT
Start: 2023-05-10 | End: 2023-11-27 | Stop reason: SDUPTHER

## 2023-05-10 RX ORDER — BACLOFEN 5 MG/1
5 TABLET ORAL NIGHTLY
Qty: 90 TABLET | Refills: 1 | Status: SHIPPED | OUTPATIENT
Start: 2023-05-10 | End: 2024-01-05 | Stop reason: SDUPTHER

## 2023-05-10 NOTE — PROGRESS NOTES
Davon Barrios is a 72 y.o. male seen today for follow-up on his diabetes.  Patient's blood sugars are markedly improved but unfortunately continues to have low blood sugars at night into the 60s at times.  We discussed decreasing his nighttime insulin to 30 units and perhaps a snack before bed.  Patient will continue to monitor his glucose through his continual monitoring device.  Patient is due for lipids a CBC CMP and A1c and the family would appreciate home health once again.    Past Medical History:   Diagnosis Date    Carcinoma of prostate     Hypertension     Sleep apnea     Type 2 diabetes mellitus     Vitamin B 12 deficiency(non anemic)      Family History   Problem Relation Age of Onset    Diabetes Mother     Rectal cancer Father     Diabetes Father     Hypertension Father     Lung cancer Father     Hypertension Sister     Hypertension Brother      Current Outpatient Medications on File Prior to Visit   Medication Sig Dispense Refill    ADULT LOW DOSE ASPIRIN ORAL Take 81 mg by mouth once daily.      ascorbic Acid (VITAMIN C) 500 mg CpSR Take 500 mg by mouth once daily.      atorvastatin (LIPITOR) 10 MG tablet TAKE 1 TABLET BY MOUTH ONCE A DAY AS DIRECTED 90 tablet 1    carvediloL (COREG) 3.125 MG tablet TAKE 1 TABLET BY MOUTH TWICE A DAY AS DIRECTED 90 tablet 1    citalopram (CELEXA) 20 MG tablet TAKE 1 TABLET (20 MG TOTAL) BY MOUTH ONCE DAILY. 90 tablet 1    dapagliflozin (FARXIGA) 10 mg tablet Take 1 tablet (10 mg total) by mouth once daily. 30 tablet 5    gabapentin (NEURONTIN) 300 MG capsule Take 1 capsule (300 mg total) by mouth 3 (three) times daily. (Patient taking differently: Take 3 capsules by mouth every morning and take 2 capsules by mouth every evening) 270 capsule 1    insulin NPH-insulin regular, 70/30, (NOVOLIN 70/30) 100 unit/mL (70-30) injection Inject 42 units subcutaneously every morning and inject 38 units subcutaneously every evening      insulin syringe-needle U-100 (BD INSULIN  "SYRINGE) 1 mL 25 gauge x 5/8" Syrg BD Insulin Syringe 1 mL 25 gauge x 5/8"      latanoprost 0.005 % ophthalmic solution latanoprost 0.005 % eye drops      vitamin E 100 UNIT capsule Take 100 capsules by mouth once daily.      [DISCONTINUED] enalapril (VASOTEC) 20 MG tablet TAKE 1 TABLET BY MOUTH ONCE A DAY AS DIRECTED 90 tablet 0    [DISCONTINUED] baclofen (LIORESAL) 5 mg Tab tablet Take 1 tablet (5 mg total) by mouth every evening. 90 tablet 1     No current facility-administered medications on file prior to visit.     Immunization History   Administered Date(s) Administered    COVID-19, MRNA, LN-S, PF (MODERNA FULL 0.5 ML DOSE) 03/05/2021, 04/06/2021, 11/19/2021    Influenza - High Dose - PF (65 years and older) 03/01/2021    Influenza - Quadrivalent - PF *Preferred* (6 months and older) 09/16/2021    Influenza Split 01/01/2015    Pneumococcal Conjugate - 13 Valent 03/01/2021    Pneumococcal Polysaccharide - 23 Valent 09/03/2014       Review of Systems   Constitutional:  Negative for fever, malaise/fatigue and weight loss.   Respiratory:  Negative for shortness of breath.    Cardiovascular:  Negative for chest pain and palpitations.   Gastrointestinal:  Negative for nausea and vomiting.   Neurological:  Positive for weakness.   Psychiatric/Behavioral:  Negative for depression.       Vitals:    05/10/23 0847   BP: 118/60   Pulse: 66   Resp: 18   Temp: 97.5 °F (36.4 °C)       Physical Exam  Vitals reviewed.   Eyes:      Conjunctiva/sclera: Conjunctivae normal.   Pulmonary:      Effort: Pulmonary effort is normal.   Neurological:      General: No focal deficit present.      Mental Status: He is alert.   Psychiatric:         Mood and Affect: Mood normal.        Assessment and Plan  Nightmares  -     baclofen (LIORESAL) 5 mg Tab tablet; Take 1 tablet (5 mg total) by mouth every evening.  Dispense: 90 tablet; Refill: 1    Hypertension, unspecified type  -     enalapril (VASOTEC) 20 MG tablet; Take 1 tablet (20 mg total) " by mouth once daily. as directed  Dispense: 90 tablet; Refill: 1            Return to clinic in 1 month for follow-up.  Patient will have home health restarted and I have asked him to draw a CBC CMP lipid and A1c level on the patient.  He is also candidate for PT and OT as well as nursing care.    Health Maintenance Topics with due status: Not Due       Topic Last Completion Date    Influenza Vaccine 09/16/2021    Colorectal Cancer Screening 01/19/2022    Eye Exam 08/09/2022    Diabetes Urine Screening 08/16/2022    Foot Exam 08/16/2022    Hemoglobin A1c 02/21/2023    Low Dose Statin 05/10/2023

## 2023-06-05 ENCOUNTER — DOCUMENT SCAN (OUTPATIENT)
Dept: HOME HEALTH SERVICES | Facility: HOSPITAL | Age: 73
End: 2023-06-05
Payer: MEDICARE

## 2023-06-06 ENCOUNTER — EXTERNAL HOME HEALTH (OUTPATIENT)
Dept: HOME HEALTH SERVICES | Facility: HOSPITAL | Age: 73
End: 2023-06-06
Payer: MEDICARE

## 2023-06-06 ENCOUNTER — LAB REQUISITION (OUTPATIENT)
Dept: LAB | Facility: HOSPITAL | Age: 73
End: 2023-06-06
Payer: MEDICARE

## 2023-06-06 DIAGNOSIS — E11.9 TYPE 2 DIABETES MELLITUS WITHOUT COMPLICATIONS: ICD-10-CM

## 2023-06-06 LAB
EST. AVERAGE GLUCOSE BLD GHB EST-MCNC: 167 MG/DL
HBA1C MFR BLD HPLC: 7.6 % (ref 4.5–6.6)

## 2023-06-06 PROCEDURE — 83036 HEMOGLOBIN GLYCOSYLATED A1C: CPT

## 2023-06-12 ENCOUNTER — DOCUMENT SCAN (OUTPATIENT)
Dept: HOME HEALTH SERVICES | Facility: HOSPITAL | Age: 73
End: 2023-06-12
Payer: MEDICARE

## 2023-06-30 ENCOUNTER — EXTERNAL CHRONIC CARE MANAGEMENT (OUTPATIENT)
Dept: FAMILY MEDICINE | Facility: CLINIC | Age: 73
End: 2023-06-30
Payer: MEDICARE

## 2023-06-30 PROCEDURE — G0511 CCM/BHI BY RHC/FQHC 20MIN MO: HCPCS | Mod: ,,, | Performed by: FAMILY MEDICINE

## 2023-06-30 PROCEDURE — G0511 PR CHRONIC CARE MGMT, RHC OR FQHC ONLY, 20 MINS OR MORE: ICD-10-PCS | Mod: ,,, | Performed by: FAMILY MEDICINE

## 2023-07-31 ENCOUNTER — EXTERNAL CHRONIC CARE MANAGEMENT (OUTPATIENT)
Dept: FAMILY MEDICINE | Facility: CLINIC | Age: 73
End: 2023-07-31
Payer: MEDICARE

## 2023-07-31 PROCEDURE — G0511 PR CHRONIC CARE MGMT, RHC OR FQHC ONLY, 20 MINS OR MORE: ICD-10-PCS | Mod: ,,, | Performed by: FAMILY MEDICINE

## 2023-07-31 PROCEDURE — G0511 CCM/BHI BY RHC/FQHC 20MIN MO: HCPCS | Mod: ,,, | Performed by: FAMILY MEDICINE

## 2023-08-10 ENCOUNTER — CLINICAL SUPPORT (OUTPATIENT)
Dept: REHABILITATION | Facility: HOSPITAL | Age: 73
End: 2023-08-10
Payer: MEDICARE

## 2023-08-10 DIAGNOSIS — M25.562 LEFT KNEE PAIN: ICD-10-CM

## 2023-08-10 DIAGNOSIS — Z96.652 PRESENCE OF LEFT ARTIFICIAL KNEE JOINT: ICD-10-CM

## 2023-08-10 DIAGNOSIS — M24.662 ANKYLOSIS OF LEFT KNEE: ICD-10-CM

## 2023-08-10 DIAGNOSIS — M24.662 ANKYLOSIS OF LEFT KNEE: Primary | ICD-10-CM

## 2023-08-10 DIAGNOSIS — M25.562 LEFT KNEE PAIN: Primary | ICD-10-CM

## 2023-08-10 PROCEDURE — 97161 PT EVAL LOW COMPLEX 20 MIN: CPT

## 2023-08-10 PROCEDURE — 97110 THERAPEUTIC EXERCISES: CPT

## 2023-08-10 NOTE — PLAN OF CARE
[facility]   Physical Therapy Initial Evaluation      Name: Davon Barrios  Clinic Number: [id number]    Therapy Diagnosis:   Encounter Diagnoses   Name Primary?    Left knee pain     Presence of left artificial knee joint     Ankylosis of left knee Yes        Physician: Erick Almendarez MD    Physician Orders: PROM/active range of motion; progressive resistive exercise.  Daily x 2 weeks  Medical Diagnosis from Referral: Pain left knee; presence of left artificial knee joint; Ankylosis, left knee and s/p left knee arthroscopy with lysis of adhesions and manipulation under anesthesia.  Evaluation Date: 8/10/2023  Authorization Period Expiration: 8/24/23  Plan of Care Expiration: 8/24/23  Visit # / Visits authorized: 10   FOTO: 29/100    Precautions: Standard     Time In: 1054  Time Out: 1025  Total Appointment Time (timed & untimed codes): 28 minutes    Subjective     Date of onset: 8/8/23    History of current condition - Davon is s/p left knee arthroscopy with lysis of adhesions and manipulation under anesthesia. He is now referred to therapy for knee rehab. He had previous history of Left TKR which failed and he developed significant scar tissue adhesions in which left him with ROM and functional deficits.    Falls: none    Imaging: none:     Prior Therapy: yes  Social History:  lives with their spouse  Occupation: retired  Prior Level of Function: independent  Current Level of Function: independent    Pain:  Current 0/10, worst 7/10, best 0/10   Location: left knee    Description: sore  Aggravating Factors: Bending  Easing Factors: pain medication, rest, and elevation    Patients goals: to be able to walk straight without a cane     Medical History:   Past Medical History:   Diagnosis Date    Carcinoma of prostate     Hypertension     Sleep apnea     Type 2 diabetes mellitus     Vitamin B 12 deficiency(non anemic)        Surgical History:   Davon Barrios  has no past surgical  history on file.    Medications:   Davon has a current medication list which includes the following prescription(s): aspirin, ascorbic acid, atorvastatin, baclofen, carvedilol, citalopram, dapagliflozin propanediol, enalapril, gabapentin, insulin nph-insulin regular (70/30), bd insulin syringe, latanoprost, and vitamin e.    Allergies:   Review of patient's allergies indicates:  No Known Allergies     Objective      Incisions: ORTHOSCOPIC left knee  Girth Measurements: Right 43.5 Left 48.5 CM  Comments:      Range of motion:  Motion Left AROM Left PROM   Hip flexion  WITHIN FUNCTIONAL LIMITS  WITHIN FUNCTIONAL LIMITS   Hip extension  WITHIN FUNCTIONAL LIMITS  WITHIN FUNCTIONAL LIMITS   Hip abduction  WITHIN FUNCTIONAL LIMITS  WITHIN FUNCTIONAL LIMITS   Hip adduction  WITHIN FUNCTIONAL LIMITS  WITHIN FUNCTIONAL LIMITS   Internal rotation  WITHIN FUNCTIONAL LIMITS  WITHIN FUNCTIONAL LIMITS   External rotation  WITHIN FUNCTIONAL LIMITS  WITHIN FUNCTIONAL LIMITS   Knee extension   -20   -10   Knee flexion   60   70   Ankle DF  WITHIN FUNCTIONAL LIMITS  WITHIN FUNCTIONAL LIMITS   Ankle PF  WITHIN FUNCTIONAL LIMITS  WITHIN FUNCTIONAL LIMITS   Ankle Inversion  WITHIN FUNCTIONAL LIMITS  WITHIN FUNCTIONAL LIMITS   Ankle Eversion  WITHIN FUNCTIONAL LIMITS  WITHIN FUNCTIONAL LIMITS       Manual muscle test   Muscle Left    Hip flexion  MMT strength: 3+/5   Hip extension  MMT strength: 3-/5   Hip abduction  MMT strength: 3+/5   Hip adduction  MMT strength: 4/5   Hip internal rotation  MMT strength: 3-/5   Hip external rotation  MMT strength: 3-/5   Knee extension  MMT strength: 2+/5   Knee flexion  MMT strength: 2+/5   Ankle DF  MMT strength: 4/5   Ankle PF  MMT strength: 4/5   Ankle inversion  MMT strength: 4/5   Ankle eversion  MMT strength: 4/5       Gait:  Weight bearing precautions: WBAT  Assistive device: straight cane  Ambulation distance and deviations: limited knee flexion and extension; decreased stance phase left  knee.  Stairs: n/a  Comments:          Limitation/Restriction for FOTO Knee Survey    Therapist reviewed FOTO scores for Davon Barrios on 8/10/2023.   FOTO documents entered into [employer] - see Media section.    Limitation Score: 29%       Treatment:  Davon performed Scifit stepper x 8 minutes to facilitate ROM and strength.        Patient Education and Home Exercises     Education provided:   - plan of care discussed with patient.      Assessment     aDvon is a 73 y.o. male referred to outpatient Physical Therapy with a medical diagnosis of Pain left knee; presence of left artificial knee joint; Ankylosis, left knee and s/p left knee arthroscopy with lysis of adhesions and manipulation under anesthesia. Patient presents with decreased ROM, muscle weakness, and impaired ambulation.    Patient prognosis is Fair.   Patient will benefit from skilled outpatient Physical Therapy to address the deficits stated above and in the chart below, provide patient /family education, and to maximize patientt's level of independence.     Plan of care discussed with patient: Yes  Patient's spiritual, cultural and educational needs considered and patient is agreeable to the plan of care and goals as stated below:     Anticipated Barriers for therapy: none    Medical Necessity is demonstrated by the following  History  Co-morbidities and personal factors that may impact the plan of care [] LOW: no personal factors / co-morbidities  [x] MODERATE: 1-2 personal factors / co-morbidities  [] HIGH: 3+ personal factors / co-morbidities    Moderate / High Support Documentation:   Co-morbidities affecting plan of care:   Past Medical History:   Diagnosis Date    Carcinoma of prostate     Hypertension     Sleep apnea     Type 2 diabetes mellitus     Vitamin B 12 deficiency(non anemic)        Personal Factors:   no deficits     Examination  Body Structures and Functions, activity limitations and participation restrictions that may impact the  plan of care [x] LOW: addressing 1-2 elements  [x] MODERATE: 3+ elements  [] HIGH: 4+ elements (please support below)    Moderate / High Support Documentation: ROM, strength, ambulation, balance     Clinical Presentation [x] LOW: stable  [] MODERATE: Evolving  [] HIGH: Unstable     Decision Making/ Complexity Score: low       Goals:  Short Term Goals: 1 week    Pt will increase left knee AROM flexion to 70 degrees, knee extension to -15 degrees, and quad lag to -15 degrees to allow patient normal gait pattern  Pt will report decrease in pain to 0/10 to improve quality of life.    Long Term Goals: 2 weeks   1. Pt will increase left knee flexion to 90 degrees, knee extension to -10 degrees, and quad lag to -10 degrees to allow patient normal gait mechanics for community mobility.  2. Pt will increase left knee strength to 3-/5 to allow patient to safely return to prior level of function.      Plan     Plan of care Certification: 8/10/2023 to 8/24/23.    Outpatient Physical Therapy 5 times weekly for 2 weeks to include the following interventions: Electrical Stimulation IFC, Manual Therapy, Moist Heat/ Ice, Neuromuscular Re-ed, Patient Education, and Therapeutic Exercise.     Adin Kaminski, PT       Physician Signature:________________________________________________      Date:____________________________________________________________

## 2023-08-11 ENCOUNTER — CLINICAL SUPPORT (OUTPATIENT)
Dept: REHABILITATION | Facility: HOSPITAL | Age: 73
End: 2023-08-11
Payer: MEDICARE

## 2023-08-11 DIAGNOSIS — M25.662 DECREASED RANGE OF MOTION (ROM) OF LEFT KNEE: Primary | ICD-10-CM

## 2023-08-11 DIAGNOSIS — M24.662 ANKYLOSIS OF LEFT KNEE: ICD-10-CM

## 2023-08-11 PROCEDURE — 97110 THERAPEUTIC EXERCISES: CPT

## 2023-08-11 NOTE — PROGRESS NOTES
[facility]   Physical Therapy Treatment Note      Name: Davon STAPLES [facility] Number: [id number]    Therapy Diagnosis:   Encounter Diagnoses   Name Primary?    Ankylosis of left knee     Decreased range of motion (ROM) of left knee Yes     Physician: Erick Almendarez MD    Visit Date: 8/11/2023  Visit #: 2/10  PTA Visit #: 0/5     Time In: 1245  Time Out: 1325  Total Billable Time: 40 minutes    Subjective     Pt reports: his left knee hurts a little but not too bad.  He  will be instructed in  home exercise program.  Response to previous treatment: good  Functional change: none    Pain: 3/10  Location: left knee      Objective      Objective Measures updated at progress report unless specified.     Treatment     Davon received the treatments listed below:      therapeutic exercises to left LE develop strength, endurance, and ROM for 40 minutes including:  SciFit 6 minutes level 1.5  Heel slides with 3 s/h 3 x 10  Seated hamstring curls 3 x 10 red tband  Quad sets 2 x 1 minute  SLR, side raises 3 x 10   Supine knee flexion stretch with strap 10 x 10 s/h  Standing hip flexion, hamstring curls, heel raises 3 x 10        Patient Education and Home Exercises       Education provided:   -    received verbal and tactile cues to facilitate proper execution of exercises and body mechanics.     Written Home Exercises Provided: yes. Exercises were reviewed and Davon was able to demonstrate them prior to the end of the session.  Davon demonstrated good  understanding of the education provided. He received a printed handout of home exercises.    Assessment     Davon tolerated therapy well with only mild discomfort during exercises.    Davon Is progressing well towards his goals.   Pt prognosis is Good.     Pt will continue to benefit from skilled outpatient physical therapy to address the deficits listed in the problem list box on initial evaluation, provide pt/family education and to  maximize pt's level of independence in the home and community environment.     Pt's spiritual, cultural and educational needs considered and pt agreeable to plan of care and goals.     Anticipated barriers to physical therapy: none    Goals:   Goals:  Short Term Goals: 1 week    Pt will increase left knee AROM flexion to 70 degrees, knee extension to -15 degrees, and quad lag to -15 degrees to allow patient normal gait pattern  Pt will report decrease in pain to 0/10 to improve quality of life.    Long Term Goals: 2 weeks   1. Pt will increase left knee flexion to 90 degrees, knee extension to -10 degrees, and quad lag to -10 degrees to allow patient normal gait mechanics for community mobility.  2. Pt will increase left knee strength to 3-/5 to allow patient to safely return to prior level of function.    Plan     Continue with present plan of care.    Adin Kaminski, PT

## 2023-08-14 ENCOUNTER — CLINICAL SUPPORT (OUTPATIENT)
Dept: REHABILITATION | Facility: HOSPITAL | Age: 73
End: 2023-08-14
Payer: MEDICARE

## 2023-08-14 DIAGNOSIS — M24.662 ANKYLOSIS OF LEFT KNEE: ICD-10-CM

## 2023-08-14 DIAGNOSIS — M25.662 DECREASED RANGE OF MOTION (ROM) OF LEFT KNEE: Primary | ICD-10-CM

## 2023-08-14 PROCEDURE — 97110 THERAPEUTIC EXERCISES: CPT

## 2023-08-14 NOTE — PROGRESS NOTES
[facility]   Physical Therapy Treatment Note      Name: Davon STAPLES [facility] Number: [id number]    Therapy Diagnosis:   Encounter Diagnoses   Name Primary?    Decreased range of motion (ROM) of left knee Yes    Ankylosis of left knee      Physician: Erick Almendarez MD    Visit Date: 8/14/2023  Visit #: 3/10  PTA Visit #: 0/5     Time In: 758  Time Out: 842  Total Billable Time:  minutes    Subjective     Pt reports: no complaints of pain.  He was instructed in home exercise program.  Response to previous treatment: good  Functional change: none    Pain: 0/10  Location: left knee      Objective      Objective Measures updated at progress report unless specified.     Treatment     Davon received the treatments listed below:      therapeutic exercises to left LE develop strength, endurance, and ROM for 44 minutes including:  SciFit 6 minutes level 1.5  Heel slides with 3 s/h 3 x 10  Seated hamstring curls 3 x 10 red tband  Quad sets 2 x 1 minute  SLR, side raises 3 x 10   Supine knee flexion stretch with strap 10 x 10 s/h  Standing hip flexion, hamstring curls, heel raises 3 x 10      Patient Education and Home Exercises       Education provided:   -    received verbal and tactile cues to facilitate proper execution of exercises and body mechanics.     Written Home Exercises Provided: Patient instructed to cont prior HEP.     Assessment     Davon tolerated therapy well and had no difficulties with treatment today.    Davon Is progressing well towards his goals.   Pt prognosis is Good.     Pt will continue to benefit from skilled outpatient physical therapy to address the deficits listed in the problem list box on initial evaluation, provide pt/family education and to maximize pt's level of independence in the home and community environment.     Pt's spiritual, cultural and educational needs considered and pt agreeable to plan of care and goals.     Anticipated barriers to  physical therapy: none    Goals:   Short Term Goals: 1 week    Pt will increase left knee AROM flexion to 70 degrees, knee extension to -15 degrees, and quad lag to -15 degrees to allow patient normal gait pattern  Pt will report decrease in pain to 0/10 to improve quality of life.    Long Term Goals: 2 weeks   1. Pt will increase left knee flexion to 90 degrees, knee extension to -10 degrees, and quad lag to -10 degrees to allow patient normal gait mechanics for community mobility.  2. Pt will increase left knee strength to 3-/5 to allow patient to safely return to prior level of function.    Plan     Continue with present plan of care.    Adin Kaminski, PT

## 2023-08-15 ENCOUNTER — CLINICAL SUPPORT (OUTPATIENT)
Dept: REHABILITATION | Facility: HOSPITAL | Age: 73
End: 2023-08-15
Payer: MEDICARE

## 2023-08-15 DIAGNOSIS — M24.662 ANKYLOSIS OF LEFT KNEE: Primary | ICD-10-CM

## 2023-08-15 PROCEDURE — 97110 THERAPEUTIC EXERCISES: CPT | Mod: CQ

## 2023-08-15 NOTE — PROGRESS NOTES
[facility]   Physical Therapy Treatment Note      Name: Davon STAPLES [facility] Number: [id number]    Therapy Diagnosis:   Encounter Diagnosis   Name Primary?    Ankylosis of left knee Yes     Physician: Erick Almendarez MD    Visit Date: 8/15/2023  Visit #: 4/10  PTA Visit #: 1/5     Time In: 0848  Time Out: 0930  Total Billable Time: 42 minutes    Subjective     Pt reports: no complaints of pain.  He was instructed in home exercise program.  Response to previous treatment: good  Functional change: none    Pain: 03/10  Location: left knee      Objective    8/15/23  ROM  Left Knee Flexion 0-75   Left Knee Extension -15     Objective Measures updated at progress report unless specified.     Treatment     Davon received the treatments listed below:      therapeutic exercises to left LE develop strength, endurance, and ROM for 44 minutes including:  SciFit 6 minutes level 1.5 - not today  Seated Heel slides with 3 s/h 3 x 10  Seated hamstring curls 3 x 10 red tband  Seated LAQ 3 x 10  Supine Quad sets 2 x 10  Supine SLR, side raises 3 x 10   Supine knee flexion stretch with strap 10 x 10 s/h- not today  Standing hip flexion, hamstring curls 3 x 10   Standing heel raises 3 x 10 - not today because of fatigue and SOB      Patient Education and Home Exercises       Education provided:   -    received verbal and tactile cues to facilitate proper execution of exercises and body mechanics.     Written Home Exercises Provided: Patient instructed to cont prior HEP.     Assessment     Davon tolerated therapy well without extreme difficulties with treatment today. Pt complained of lightheadedness and vitals were taken measuring: /49  and O2 Sat 95%. Patient was advised to breathe through exercises and not hold his breath which he said he was doing prior to feeling lightheaded. After breathing. There was some bruising on L LE anteriorly as well as posteriorly. The area is not warm but  there is purple and yellowing around the incision site. Patient was very SOB and fatigue after standing exercises.     Davon Is progressing well towards his goals.   Pt prognosis is Good.     Pt will continue to benefit from skilled outpatient physical therapy to address the deficits listed in the problem list box on initial evaluation, provide pt/family education and to maximize pt's level of independence in the home and community environment.     Pt's spiritual, cultural and educational needs considered and pt agreeable to plan of care and goals.     Anticipated barriers to physical therapy: none    Goals:   Short Term Goals: 1 week    Pt will increase left knee AROM flexion to 70 degrees, knee extension to -15 degrees, and quad lag to -15 degrees to allow patient normal gait pattern GOAL MET  Pt will report decrease in pain to 0/10 to improve quality of life.    Long Term Goals: 2 weeks   1. Pt will increase left knee flexion to 90 degrees, knee extension to -10 degrees, and quad lag to -10 degrees to allow patient normal gait mechanics for community mobility.  2. Pt will increase left knee strength to 3-/5 to allow patient to safely return to prior level of function.    Plan     Continue with present plan of care.    Flo Bean, PTA

## 2023-08-16 ENCOUNTER — CLINICAL SUPPORT (OUTPATIENT)
Dept: REHABILITATION | Facility: HOSPITAL | Age: 73
End: 2023-08-16
Payer: MEDICARE

## 2023-08-16 DIAGNOSIS — M24.662 ANKYLOSIS OF LEFT KNEE: ICD-10-CM

## 2023-08-16 DIAGNOSIS — M25.662 DECREASED RANGE OF MOTION (ROM) OF LEFT KNEE: Primary | ICD-10-CM

## 2023-08-16 PROCEDURE — 97110 THERAPEUTIC EXERCISES: CPT | Mod: CQ

## 2023-08-16 NOTE — PROGRESS NOTES
"[facility]   Physical Therapy Treatment Note      Name: Davon STAPLES [facility] Number: [id number]    Therapy Diagnosis:   Encounter Diagnoses   Name Primary?    Decreased range of motion (ROM) of left knee Yes    Ankylosis of left knee      Physician: Erick Almendarez MD    Visit Date: 8/16/2023  Visit #: 5/10  PTA Visit #: 2/5     Time In: 1056  Time Out: 1140  Total Billable Time: 44 minutes    Subjective     Pt reports: no complaints of pain. "Its just tight when I bend it"  He was instructed in home exercise program.  Response to previous treatment: good  Functional change: none    Pain: 0/10  Location: left knee      Objective    8/15/23  ROM  Left Knee Flexion 0-75   Left Knee Extension -15     Objective Measures updated at progress report unless specified.     Treatment     Davon received the treatments listed below:      therapeutic exercises to left LE develop strength, endurance, and ROM for ** minutes including:  SciFit 6 minutes level 1.5   Seated Heel slides with 3 s/h 3 x 10  Seated hamstring curls 3 x 10 red tband  Seated LAQ 3 x 10  Supine Quad sets 2 x 1 min  Supine SLR, side raises 3 x 10   Supine knee flexion stretch with strap 10 x 3 s/h  Standing hip flexion 3 x 10  hamstring curls 3 x 10   Standing heel raises 3 x 10   Hamstring stretch x 10 10s/h      Patient Education and Home Exercises       Education provided:   -    received verbal and tactile cues to facilitate proper execution of exercises and body mechanics.     Written Home Exercises Provided: Patient instructed to cont prior HEP.     Assessment     Davon tolerated therapy well without extreme difficulties with treatment today.     There is still some bruising on L LE anteriorly as well as posteriorly.  Suture site is red with eschar The area is not warm but there is purple and yellowing around the incision site. A band aid was placed on incision site until he returns home where he will place a new " lyly strip on it. Patient was SOB and fatigue after standing exercises but better than yesterday.     Davon Is progressing well towards his goals.   Pt prognosis is Good.     Pt will continue to benefit from skilled outpatient physical therapy to address the deficits listed in the problem list box on initial evaluation, provide pt/family education and to maximize pt's level of independence in the home and community environment.     Pt's spiritual, cultural and educational needs considered and pt agreeable to plan of care and goals.     Anticipated barriers to physical therapy: none    Goals:   Short Term Goals: 1 week    Pt will increase left knee AROM flexion to 70 degrees, knee extension to -15 degrees, and quad lag to -15 degrees to allow patient normal gait pattern GOAL MET  Pt will report decrease in pain to 0/10 to improve quality of life.     Long Term Goals: 2 weeks   1. Pt will increase left knee flexion to 90 degrees, knee extension to -10 degrees, and quad lag to -10 degrees to allow patient normal gait mechanics for community mobility.  2. Pt will increase left knee strength to 3-/5 to allow patient to safely return to prior level of function.    Plan     Continue with present plan of care.    Flo Bean, PTA

## 2023-08-17 ENCOUNTER — CLINICAL SUPPORT (OUTPATIENT)
Dept: REHABILITATION | Facility: HOSPITAL | Age: 73
End: 2023-08-17
Payer: MEDICARE

## 2023-08-17 DIAGNOSIS — M25.662 DECREASED RANGE OF MOTION (ROM) OF LEFT KNEE: Primary | ICD-10-CM

## 2023-08-17 PROCEDURE — 97110 THERAPEUTIC EXERCISES: CPT | Mod: CQ

## 2023-08-17 NOTE — PROGRESS NOTES
[facility]   Physical Therapy Treatment Note      Name: Davon STAPLES [facility] Number: [id number]    Therapy Diagnosis:   Encounter Diagnosis   Name Primary?    Decreased range of motion (ROM) of left knee Yes     Physician: Erick Almendarez MD    Visit Date: 8/17/2023  Visit #: 6/10  PTA Visit #: 3/5     Time In: 1104  Time Out: 1148  Total Billable Time: 44 minutes    Subjective     Pt reports: The bruising and swelling I think has gotten worse I worked really hard yesterday in here. I used ice to help with the swelling , too.   He was instructed in home exercise program.  Response to previous treatment: good  Functional change: none    Pain: 0/10  Location: left knee      Objective      8/15/23  Range of Motion:  Left Knee Flexion 75   Left Knee Extension -15       Treatment     Davon received the treatments listed below:      therapeutic exercises to left LE develop strength, endurance, and ROM for 44 minutes including:  SciFit 6 minutes level 2.3  Standing hip flexion, Straight leg raises, hip abduction, Hamstring curls, Heel raises 2 x 10   Seated Heel slides with towel 10 x 10 s/h   Seated hamstring curls red tband 3 x 10   Seated LAQ 3 x 10  Seated hip adduction with medium ball 2 x 10   Supine Quad sets 2 x 1 min  Supine Straight leg raises , hip abduction 2 x 10   Supine knee flexion stretch with strap 10 x 10 s/h     Patient Education and Home Exercises       Education provided:   - received verbal and tactile cues to facilitate proper execution of exercises and body mechanics.     Written Home Exercises Provided: Patient instructed to cont prior HEP.     Assessment     Davon tolerated his knee rehab fair to well with freq rest breaks due to left hip muscle fatigue per patient.    Davon Is progressing well towards his goals.   Pt prognosis is Good.     Pt will continue to benefit from skilled outpatient physical therapy to address the deficits listed in the problem  list box on initial evaluation, provide pt/family education and to maximize pt's level of independence in the home and community environment.     Pt's spiritual, cultural and educational needs considered and pt agreeable to plan of care and goals.     Anticipated barriers to physical therapy: none    Goals:   Short Term Goals: 1 week    Pt will increase left knee AROM flexion to 70 degrees, knee extension to -15 degrees, and quad lag to -15 degrees to allow patient normal gait pattern GOAL MET  Pt will report decrease in pain to 0/10 to improve quality of life.     Long Term Goals: 2 weeks   1. Pt will increase left knee flexion to 90 degrees, knee extension to -10 degrees, and quad lag to -10 degrees to allow patient normal gait mechanics for community mobility.  2. Pt will increase left knee strength to 3-/5 to allow patient to safely return to prior level of function.    Plan     Continue with current plan of care.    Janine Aquino, PTA

## 2023-08-21 ENCOUNTER — CLINICAL SUPPORT (OUTPATIENT)
Dept: REHABILITATION | Facility: HOSPITAL | Age: 73
End: 2023-08-21
Payer: MEDICARE

## 2023-08-21 DIAGNOSIS — M24.662 ANKYLOSIS OF LEFT KNEE: Primary | ICD-10-CM

## 2023-08-21 PROCEDURE — 97110 THERAPEUTIC EXERCISES: CPT | Mod: CQ

## 2023-08-21 NOTE — PROGRESS NOTES
"[facility]   Physical Therapy Treatment Note      Name: Davon STAPLES [facility] Number: [id number]    Therapy Diagnosis:   Encounter Diagnosis   Name Primary?    Ankylosis of left knee Yes     Physician: Erick Almendarez MD    Visit Date: 8/21/2023  Visit #: 7/10  PTA Visit #: 4/5     Time In: 0917  Time Out: 1004  Total Billable Time: 47 minutes    Subjective     Pt reports:  "It is a little tight"  He was instructed in home exercise program.  Response to previous treatment: good  Functional change: none    Pain: 3/10  Location: left knee      Objective      8/15/23  Range of Motion:  Left Knee Flexion 75 (seated)   Left Knee Extension -15     8/21/23  Range of Motion   Left Knee Flexion  82 (seated) 78 (supine)   Left Knee Extension  -13        Treatment     Davon received the treatments listed below:      therapeutic exercises to left LE develop strength, endurance, and ROM for 47 minutes including:  SciFit 10 minutes level 2.3  Standing hip flexion, Straight leg raises, hip abduction, Hamstring curls, Heel raises, step up lunges to 6" step 2 x 10   Seated Heel slides with towel 10 x 10 s/h   Seated hamstring curls red tband 3 x 10   Seated LAQ 3 x 10   Seated hip adduction with medium ball 3 x 10   Supine Quad sets 2 x 1 min 3-5 s/h  Supine Straight leg raises , hip abduction 2 x 10   Supine knee flexion stretch with strap 10 x 10 s/h     Patient Education and Home Exercises       Education provided:   - received verbal and tactile cues to facilitate proper execution of exercises and body mechanics.     Written Home Exercises Provided: Patient instructed to cont prior HEP.     Assessment     Patient tolerated treatment well with no extreme difficulty or pain. He did have some muscle fatigue and SOB when performing standing and supine hip and knee exercises today. Patient was cued on proper mechanics of knee lunges to not circumduct L LE and to use hip flexors and knee flexors to " brennan FLORENCIA Mays Is progressing well towards his goals.   Pt prognosis is Good.     Pt will continue to benefit from skilled outpatient physical therapy to address the deficits listed in the problem list box on initial evaluation, provide pt/family education and to maximize pt's level of independence in the home and community environment.     Pt's spiritual, cultural and educational needs considered and pt agreeable to plan of care and goals.     Anticipated barriers to physical therapy: none    Goals:   Short Term Goals: 1 week    Pt will increase left knee AROM flexion to 70 degrees, knee extension to -15 degrees, and quad lag to -15 degrees to allow patient normal gait pattern GOAL MET  Pt will report decrease in pain to 0/10 to improve quality of life.     Long Term Goals: 2 weeks   1. Pt will increase left knee flexion to 90 degrees, knee extension to -10 degrees, and quad lag to -10 degrees to allow patient normal gait mechanics for community mobility.  2. Pt will increase left knee strength to 3-/5 to allow patient to safely return to prior level of function.    Plan     Continue with current plan of care.    Flo Bean, PTA

## 2023-08-22 ENCOUNTER — CLINICAL SUPPORT (OUTPATIENT)
Dept: REHABILITATION | Facility: HOSPITAL | Age: 73
End: 2023-08-22
Payer: MEDICARE

## 2023-08-22 DIAGNOSIS — M24.662 ANKYLOSIS OF LEFT KNEE: ICD-10-CM

## 2023-08-22 DIAGNOSIS — M25.662 DECREASED RANGE OF MOTION (ROM) OF LEFT KNEE: Primary | ICD-10-CM

## 2023-08-22 DIAGNOSIS — I10 HYPERTENSION, UNSPECIFIED TYPE: Primary | ICD-10-CM

## 2023-08-22 PROCEDURE — 97110 THERAPEUTIC EXERCISES: CPT | Mod: CQ

## 2023-08-22 RX ORDER — CARVEDILOL 3.12 MG/1
3.12 TABLET ORAL 2 TIMES DAILY
Qty: 90 TABLET | Refills: 1 | Status: SHIPPED | OUTPATIENT
Start: 2023-08-22 | End: 2023-11-27 | Stop reason: SDUPTHER

## 2023-08-22 NOTE — PROGRESS NOTES
"[facility]   Physical Therapy Treatment Note      Name: Davon STAPLES [facility] Number: [id number]    Therapy Diagnosis:   Encounter Diagnoses   Name Primary?    Decreased range of motion (ROM) of left knee Yes    Ankylosis of left knee      Physician: Erick Almendarez MD    Visit Date: 8/22/2023  Visit #: 8/10  PTA Visit #: 5/5     Time In: 0821  Time Out:0900  Total Billable Time: 39 minutes    Subjective     Pt reports:  "its no pain but its tight when I bend it"  He was instructed in home exercise program.  Response to previous treatment: good  Functional change: none    Pain: 0/10  Location: left knee      Objective      8/15/23  Range of Motion:  Left Knee Flexion 75 (seated)   Left Knee Extension -15     8/21/23  Range of Motion   Left Knee Flexion  82 (seated) 78 (supine)   Left Knee Extension  -13 (supine)       Treatment     Davon received the treatments listed below:      therapeutic exercises to left LE develop strength, endurance, and ROM for 39 minutes including:  SciFit 6 minutes level 2.5  Standing hip flexion, Straight leg raises, hip abduction, Hamstring curls, Heel raises   step up lunges to 6" step 2 x 10 - not today   Seated Heel slides with towel 3 x 10 with overpressure from PTA   Seated hamstring curls red tband 3 x 10   Seated LAQ 3 x 10   Seated Hamstring stretch on stool x 2 min  Seated hip adduction with medium ball 3 x 10 3 s/h  Supine Quad sets 2 x 1 min 3-5 s/  Supine SAQ with bolster 3 x 10 with emphasis on pulling toes back to stretch hamstrings  Supine Straight leg raises , side lying hip abduction 3 x 10   Supine knee flexion stretch with strap 10 x 10 s/h   Hamstring stretch with strap x 10 with 10 s/h    Patient Education and Home Exercises       Education provided:   - received verbal and tactile cues to facilitate proper execution of exercises and body mechanics. He was educated on how important it is to elevate and apply ice for decreased edema " and joint effusion.    Written Home Exercises Provided: Patient instructed to cont prior HEP.     Assessment     Patient tolerated treatment well with no extreme difficulty or pain. He did have some muscle fatigue and SOB when performing standing and supine hip and knee exercises today.Davon did have some hamstring tightness and decreased knee flexion because of apprehension and pain. Overpressure heel slides in sitting were performed. Patient is very motivated and is compliant with HEP.    Davon Is progressing well towards his goals.   Pt prognosis is Good.     Pt will continue to benefit from skilled outpatient physical therapy to address the deficits listed in the problem list box on initial evaluation, provide pt/family education and to maximize pt's level of independence in the home and community environment.     Pt's spiritual, cultural and educational needs considered and pt agreeable to plan of care and goals.     Anticipated barriers to physical therapy: none    Goals:   Short Term Goals: 1 week    Pt will increase left knee AROM flexion to 70 degrees, knee extension to -15 degrees, and quad lag to -15 degrees to allow patient normal gait pattern GOAL MET  Pt will report decrease in pain to 0/10 to improve quality of life.     Long Term Goals: 2 weeks   1. Pt will increase left knee flexion to 90 degrees, knee extension to -10 degrees, and quad lag to -10 degrees to allow patient normal gait mechanics for community mobility.  2. Pt will increase left knee strength to 3-/5 to allow patient to safely return to prior level of function.    Plan     Continue with current plan of care.    Flo Bean, PTA

## 2023-08-23 ENCOUNTER — CLINICAL SUPPORT (OUTPATIENT)
Dept: REHABILITATION | Facility: HOSPITAL | Age: 73
End: 2023-08-23
Payer: MEDICARE

## 2023-08-23 DIAGNOSIS — M24.662 ANKYLOSIS OF LEFT KNEE: ICD-10-CM

## 2023-08-23 DIAGNOSIS — M25.662 DECREASED RANGE OF MOTION (ROM) OF LEFT KNEE: Primary | ICD-10-CM

## 2023-08-23 PROCEDURE — 97110 THERAPEUTIC EXERCISES: CPT | Mod: CQ

## 2023-08-23 NOTE — PROGRESS NOTES
"[facility]   Physical Therapy Treatment Note      Name: Davon STAPLES [facility] Number: [id number]    Therapy Diagnosis:   Encounter Diagnoses   Name Primary?    Decreased range of motion (ROM) of left knee Yes    Ankylosis of left knee      Physician: Erick Almendarez MD    Visit Date: 8/23/2023  Visit #: 9/10  PTA Visit #: 6/5     Time In: 0912  Time Out:0955  Total Billable Time: 43 minutes    Subjective     Pt reports:  no complaints of pain  He was instructed in home exercise program.  Response to previous treatment: good  Functional change: none    Pain: 0/10  Location: left knee      Objective      8/15/23  Range of Motion:  Left Knee Flexion 75 (seated)   Left Knee Extension -15     8/21/23  Range of Motion   Left Knee Flexion  82 (seated) 78 (supine)   Left Knee Extension  -13 (supine)       Treatment     Davon received the treatments listed below:      therapeutic exercises to left LE develop strength, endurance, and ROM for ** minutes including:  SciFit 6 minutes level 2.5  Standing hip flexion, Straight leg raises- not today, hip abduction, Hamstring curls, Heel raises  Step up lunges to 6" step 2 x 10 - not today   Seated Heel slides with towel 3 x 10   Seated hamstring curls red tband 3 x 10   Seated LAQ 3 x 10   Seated Hamstring stretch on stool x 2 min  Seated hip adduction with medium ball 3 x 10 3 s/h  Supine Quad sets 2 x 1 min 3-5 s/h  Supine SAQ with bolster 3 x 10 with emphasis on pulling toes back to stretch hamstrings  Supine Straight leg raises , side lying hip abduction 3 x 10   Supine knee flexion stretch with strap 10 x 10 s/h   Hamstring stretch with strap x 10 with 10 s/h  Supine flexion stretch with strap x 10 with 10 s/h    Patient Education and Home Exercises       Education provided:   - received verbal and tactile cues to facilitate proper execution of exercises and body mechanics. He was educated on how important it is to elevate and apply ice for " decreased edema and joint effusion.    Written Home Exercises Provided: Patient instructed to cont prior HEP.     Assessment     Patient tolerated treatment well with no extreme difficulty or pain. He did have some muscle fatigue and SOB when performing standing and supine hip and knee exercises today. Patient expressed concern for bruising on L knee. There was some redness around incision site, but no redness or radiating numbness or pain into lower leg. Patient stated there was a foul odor coming from incision site due to him not cleaning it. Mr Barrios was educated on the importance of keeping the site clean and noticing signs of infection. PTA explained that if he was still concerned that he should contact his doctor. Patient is very motivated and is compliant with HEP.    Davon Is progressing well towards his goals.   Pt prognosis is Good.     Pt will continue to benefit from skilled outpatient physical therapy to address the deficits listed in the problem list box on initial evaluation, provide pt/family education and to maximize pt's level of independence in the home and community environment.     Pt's spiritual, cultural and educational needs considered and pt agreeable to plan of care and goals.     Anticipated barriers to physical therapy: none    Goals:   Short Term Goals: 1 week    Pt will increase left knee AROM flexion to 70 degrees, knee extension to -15 degrees, and quad lag to -15 degrees to allow patient normal gait pattern GOAL MET  Pt will report decrease in pain to 0/10 to improve quality of life.     Long Term Goals: 2 weeks   1. Pt will increase left knee flexion to 90 degrees, knee extension to -10 degrees, and quad lag to -10 degrees to allow patient normal gait mechanics for community mobility.  2. Pt will increase left knee strength to 3-/5 to allow patient to safely return to prior level of function.    Plan     Continue with current plan of care.    Flo Bean, LOUISE

## 2023-08-25 ENCOUNTER — CLINICAL SUPPORT (OUTPATIENT)
Dept: REHABILITATION | Facility: HOSPITAL | Age: 73
End: 2023-08-25
Payer: MEDICARE

## 2023-08-25 DIAGNOSIS — M24.662 ANKYLOSIS OF LEFT KNEE: ICD-10-CM

## 2023-08-25 DIAGNOSIS — M25.662 DECREASED RANGE OF MOTION (ROM) OF LEFT KNEE: Primary | ICD-10-CM

## 2023-08-25 PROCEDURE — 97110 THERAPEUTIC EXERCISES: CPT

## 2023-08-25 NOTE — PLAN OF CARE
[facility]  Physical Therapy Plan of Care Note     Name: Davon STAPLES [facility] Number: [id number]    Therapy Diagnosis:   Encounter Diagnoses   Name Primary?    Decreased range of motion (ROM) of left knee Yes    Ankylosis of left knee      Physician: Erick Almendarez MD    Visit Date: 8/25/2023    Physician Orders: Eval and Treat 3 x per week for 4 weeks  Medical Diagnosis from Referral: Pain left knee; presence of left artificial knee joint;   Ankylosis, left knee and s/p left knee arthroscopy with lysis of adhesions and manipulation under anesthesia.  Evaluation Date: 8/10/23  Authorization Period Expiration: 9/22/23   Plan of Care Expiration: 9/22/23   Visit # / Visits authorized: 10/ 22    Precautions: Standard  Functional Level Prior to Evaluation:  independent    Subjective     Update: Davon is participating well and therapy and receiving protocol treatment for ROM and strengthening of left knee.    Objective      Update:  8/21/23  Range of Motion   Left Knee Flexion  82 (seated) 78 (supine)   Left Knee Extension  -13 (supine)        Assessment     Update: Davon will continue with physical therapy per MD orders and progress towards established goals.    Previous Short Term Goals Status:   progressing  New Short Term Goals Status:   Goals: Updated on 8/25/23  Short Term Goals: 2 week    Pt will increase left knee AROM flexion to 90 degrees, knee extension to -10 degrees, and quad lag to -10 degrees to allow patient normal gait pattern GOAL MET  Pt will increase left knee strength to 3-/5 to improve functional mobility with all ADL's.    Long Term Goal Status: modified:  see below  Reasons for Recertification of Therapy:   per MD orders.    GOALS  Goals: Updated on 8/25/23  Short Term Goals: 2 week    Pt will increase left knee AROM flexion to 90 degrees, knee extension to -10 degrees, and quad lag to -10 degrees to allow patient normal gait pattern.  Pt will increase left  knee strength to 3-/5 to improve functional mobility with all ADL's.    Long Term Goals: 2 weeks   1. Pt will increase left knee flexion to 100 degrees, knee extension to -5 degrees, and quad lag to -5 degrees to allow patient normal gait mechanics for community mobility.  2. Patient will increase left knee strength to 3+/5 to allow patient to safely return to prior level of function.    Plan     Updated Certification Period: 8/25/23 to 9/22/23   Recommended Treatment Plan: 3 times per week for 4 weeks:  Electrical Stimulation IFC, Manual Therapy, Moist Heat/ Ice, Neuromuscular Re-ed, and Therapeutic Exercise  Other Recommendations: none    Adin Kaminski, PT       I CERTIFY THE NEED FOR THESE SERVICES FURNISHED UNDER THIS PLAN OF TREATMENT AND WHILE UNDER MY CARE   Physician's comments:     Physician's Signature: ___________________________________________________

## 2023-08-25 NOTE — PROGRESS NOTES
"[facility]   Physical Therapy Treatment Note      Name: Davon STAPLES [facility] Number: [id number]    Therapy Diagnosis:   Encounter Diagnoses   Name Primary?    Decreased range of motion (ROM) of left knee Yes    Ankylosis of left knee      Physician: Erick Almendarez MD    Visit Date: 8/25/2023  Visit #: 10/10  PTA Visit #: 0/5     Time In: 0934  Time Out:1016  Total Billable Time: 42 minutes    Subjective     Pt reports:  no complaints of pain  He was instructed in home exercise program.  Response to previous treatment: good  Functional change: none    Pain: 0/10  Location: left knee      Objective      8/15/23  Range of Motion:  Left Knee Flexion 75 (seated)   Left Knee Extension -15     8/21/23  Range of Motion   Left Knee Flexion  82 (seated) 78 (supine)   Left Knee Extension  -13 (supine)       Treatment     Davon received the treatments listed below:      therapeutic exercises to left LE develop strength, endurance, and ROM for 42 minutes including:  SciFit 6 minutes level 2.8  Standing hip flexion, Straight leg raises, hip abduction, Hamstring curls, Heel raises 2# 2 x 10  Step up lunges to 6" step 2 x 10 - not today   Seated Heel slides with towel 3 x 10   Seated hamstring curls green theraband tband 3 x 10   Seated LAQ 3 x 10 2#  Seated Hamstring stretch on stool x 2 min  Seated hip adduction with medium ball 3 x 10 3 s/h  Supine Quad sets 2 x 1 min 3-5 s/h  NOT TODAY-Supine SAQ with bolster 3 x 10 with emphasis on pulling toes back to stretch hamstrings  Supine Straight leg raises , side lying hip abduction 3 x 10   Supine knee flexion stretch with SHEET 10 x 10 s/h   NOT TODAY-Hamstring stretch with strap x 10 with 10 s/h      Patient Education and Home Exercises       Education provided:   - received verbal and tactile cues to facilitate proper execution of exercises and body mechanics. He was educated on how important it is to elevate and apply ice for decreased edema " and joint effusion.    Written Home Exercises Provided: Patient instructed to cont prior HEP.     Assessment     Davon required several rest breaks between exercises due to fatigue today but he was able to complete therapy session. He was also able to progress to weights on left LE.    Davon Is progressing well towards his goals.   Pt prognosis is Good.     Pt will continue to benefit from skilled outpatient physical therapy to address the deficits listed in the problem list box on initial evaluation, provide pt/family education and to maximize pt's level of independence in the home and community environment.     Pt's spiritual, cultural and educational needs considered and pt agreeable to plan of care and goals.     Anticipated barriers to physical therapy: none    Goals: Updated on 8/25/23  Short Term Goals: 2 week    Pt will increase left knee AROM flexion to 90 degrees, knee extension to -10 degrees, and quad lag to -10 degrees to allow patient normal gait pattern.  Pt will increase left knee strength to 3-/5 to improve functional mobility with all ADL's.    Long Term Goals: 2 weeks   1. Pt will increase left knee flexion to 100 degrees, knee extension to -5 degrees, and quad lag to -5 degrees to allow patient normal gait mechanics for community mobility.  2. Patient will increase left knee strength to 3+/5 to allow patient to safely return to prior level of function.    Plan     Continue with current plan of care.    Adin Kaminski, PT

## 2023-08-28 ENCOUNTER — CLINICAL SUPPORT (OUTPATIENT)
Dept: REHABILITATION | Facility: HOSPITAL | Age: 73
End: 2023-08-28
Payer: MEDICARE

## 2023-08-28 DIAGNOSIS — M25.662 DECREASED RANGE OF MOTION (ROM) OF LEFT KNEE: Primary | ICD-10-CM

## 2023-08-28 PROCEDURE — 97110 THERAPEUTIC EXERCISES: CPT

## 2023-08-28 NOTE — PROGRESS NOTES
"[facility]   Physical Therapy Treatment Note      Name: Davon STAPLES [facility] Number: [id number]    Therapy Diagnosis:   Encounter Diagnosis   Name Primary?    Decreased range of motion (ROM) of left knee Yes     Physician: Erick Almendarez MD    Visit Date: 8/28/2023  Visit #: 11/22  PTA Visit #: 0/5     Time In: 0933  Time Out:1014  Total Billable Time: 41 minutes    Subjective     Pt reports: no complaints today.  He was instructed in home exercise program.  Response to previous treatment: good  Functional change: none    Pain: 0/10  Location: left knee      Objective      8/15/23  Range of Motion:  Left Knee Flexion 75 (seated)   Left Knee Extension -15     8/28/23  Range of Motion supine   AROM   Left Knee Flexion  84   Left Knee Extension  -11       Treatment     Davon received the treatments listed below:      therapeutic exercises to left LE develop strength, endurance, and ROM for  minutes including:  SciFit 6 minutes level 3.0  NOT TODAY-Standing hip flexion, Straight leg raises, hip abduction, Hamstring curls, Heel raises 2# 2 x 10  Lunge stretch 6" box 2 x 10   Seated hamstring curls green theraband tband 3 x 10   Seated LAQ 3 x 10 2#  NOT TODAY-Seated Hamstring stretch on stool x 2 min  Seated hip adduction with medium ball 3 x 10 3 s/h  Supine Quad sets 2 x 1 min 3-5 s/h  Supine SAQ with bolster 3 x 10 with emphasis on pulling toes back to stretch hamstrings 2#  Supine Straight leg raises , side lying hip abduction 3 x 10 2#  Supine knee flexion stretch with SHEET 10 x 10 s/h   NOT TODAY-Hamstring stretch with strap x 10 with 10 s/h      Patient Education and Home Exercises       Education provided:   - received verbal and tactile cues to facilitate proper execution of exercises and body mechanics. He was educated on how important it is to elevate and apply ice for decreased edema and joint effusion.    Written Home Exercises Provided: Patient instructed to cont prior " HEP.     Assessment     Davon tolerated therapy well and was able to demonstrate improved ROM of left knee per objective measurements.    Davon Is progressing well towards his goals.   Pt prognosis is Good.     Pt will continue to benefit from skilled outpatient physical therapy to address the deficits listed in the problem list box on initial evaluation, provide pt/family education and to maximize pt's level of independence in the home and community environment.     Pt's spiritual, cultural and educational needs considered and pt agreeable to plan of care and goals.     Anticipated barriers to physical therapy: none    Goals: Updated on 8/25/23  Short Term Goals: 2 week    Pt will increase left knee AROM flexion to 90 degrees, knee extension to -10 degrees, and quad lag to -10 degrees to allow patient normal gait pattern.  Pt will increase left knee strength to 3-/5 to improve functional mobility with all ADL's.    Long Term Goals: 2 weeks   1. Pt will increase left knee flexion to 100 degrees, knee extension to -5 degrees, and quad lag to -5 degrees to allow patient normal gait mechanics for community mobility.  2. Patient will increase left knee strength to 3+/5 to allow patient to safely return to prior level of function.    Plan     Continue with current plan of care.    Adin Kaminski, PT

## 2023-08-30 ENCOUNTER — CLINICAL SUPPORT (OUTPATIENT)
Dept: REHABILITATION | Facility: HOSPITAL | Age: 73
End: 2023-08-30
Payer: MEDICARE

## 2023-08-30 DIAGNOSIS — M25.662 DECREASED RANGE OF MOTION (ROM) OF LEFT KNEE: Primary | ICD-10-CM

## 2023-08-30 PROCEDURE — 97110 THERAPEUTIC EXERCISES: CPT | Mod: CQ

## 2023-08-30 NOTE — PROGRESS NOTES
"[facility]   Physical Therapy Treatment Note      Name: Davon STAPLES [facility] Number: [id number]    Therapy Diagnosis:   Encounter Diagnosis   Name Primary?    Decreased range of motion (ROM) of left knee Yes     Physician: Erick Almendarez MD    Visit Date: 8/30/2023  Visit #: 12/22  PTA Visit #: 1/5     Time In: 0936  Time Out:1023  Total Billable Time: 47 minutes    Subjective     Pt reports: no complaints today.  He was instructed in home exercise program.  Response to previous treatment: good  Functional change: none    Pain: 0/10  Location: left knee      Objective      8/28/23  Range of Motion: supine   AROM   Left Knee Flexion  84   Left Knee Extension  -11       Treatment     Davon received the treatments listed below:      Therapeutic exercises to left LE develop strength, endurance, and ROM for 47 minutes including:  Lunge stretch 6" box 10 x 10 s/h   Standing hip flexion, Straight leg raises, hip abduction 2 x 10  Seated hamstring curls green theraband tband 3 x 10   Seated LAQ 3 x 10 2#  Seated hip adduction with medium ball 3 x 10 3 s/h  Supine Quad sets 2 x 1 min with 10 s/h  Supine SAQ with bolster 3 x 10 with emphasis on pulling toes back to stretch hamstrings 2#  Supine Straight leg raises, side lying hip abduction 3 x 10 2#  Supine knee flexion stretch with sheet 10 x 10 s/h      NOT TODAY-Hamstring stretch with strap x 10 with 10 s/h  NOT TODAY: SciFit 6 minutes level 3.0  NOT TODAY: Standing Hamstring curls and Heel raises    Patient Education and Home Exercises       Education provided:   - received verbal and tactile cues to facilitate proper execution of exercises and body mechanics. He was educated on how important it is to elevate and apply ice for decreased edema and joint effusion.    Written Home Exercises Provided: Patient instructed to cont prior HEP.     Assessment     Davon tolerated his left knee rehab fair to well today with c/o tightness " throughout session anterior area of patella. Pt required increased time to complete tasks.     Davon Is progressing well towards his goals.   Pt prognosis is Good.     Pt will continue to benefit from skilled outpatient physical therapy to address the deficits listed in the problem list box on initial evaluation, provide pt/family education and to maximize pt's level of independence in the home and community environment.     Pt's spiritual, cultural and educational needs considered and pt agreeable to plan of care and goals.     Anticipated barriers to physical therapy: none    Goals: Updated on 8/25/23  Short Term Goals: 2 week    Pt will increase left knee AROM flexion to 90 degrees, knee extension to -10 degrees, and quad lag to -10 degrees to allow patient normal gait pattern.  Pt will increase left knee strength to 3-/5 to improve functional mobility with all ADL's.    Long Term Goals: 2 weeks   1. Pt will increase left knee flexion to 100 degrees, knee extension to -5 degrees, and quad lag to -5 degrees to allow patient normal gait mechanics for community mobility.  2. Patient will increase left knee strength to 3+/5 to allow patient to safely return to prior level of function.    Plan     Continue with current plan of care.    Janine Aquino, PTA

## 2023-08-31 ENCOUNTER — EXTERNAL CHRONIC CARE MANAGEMENT (OUTPATIENT)
Dept: FAMILY MEDICINE | Facility: CLINIC | Age: 73
End: 2023-08-31
Payer: MEDICARE

## 2023-08-31 PROCEDURE — G0511 CCM/BHI BY RHC/FQHC 20MIN MO: HCPCS | Mod: ,,, | Performed by: FAMILY MEDICINE

## 2023-08-31 PROCEDURE — G0511 PR CHRONIC CARE MGMT, RHC OR FQHC ONLY, 20 MINS OR MORE: ICD-10-PCS | Mod: ,,, | Performed by: FAMILY MEDICINE

## 2023-09-01 ENCOUNTER — CLINICAL SUPPORT (OUTPATIENT)
Dept: REHABILITATION | Facility: HOSPITAL | Age: 73
End: 2023-09-01
Payer: MEDICARE

## 2023-09-01 DIAGNOSIS — M25.662 DECREASED RANGE OF MOTION (ROM) OF LEFT KNEE: Primary | ICD-10-CM

## 2023-09-01 PROCEDURE — 97110 THERAPEUTIC EXERCISES: CPT | Mod: CQ

## 2023-09-01 NOTE — PROGRESS NOTES
"[facility]   Physical Therapy Treatment Note      Name: Davon STAPLES [facility] Number: [id number]    Therapy Diagnosis:   Encounter Diagnosis   Name Primary?    Decreased range of motion (ROM) of left knee Yes     Physician: Erick Almendarez MD    Visit Date: 9/1/2023  Visit #: 13/22  PTA Visit #: 2/5     Time In: 0939  Time Out:1020  Total Billable Time: 41 minutes    Subjective     Pt reports: I am tired today. I feel like I overworked myself last time.   He was instructed in home exercise program.  Response to previous treatment: good  Functional change: none    Pain: 0/10  Location: left knee      Objective      8/28/23  Range of Motion: supine   AROM   Left Knee Flexion  84   Left Knee Extension  -11       Treatment     Davon received the treatments listed below:      Therapeutic exercises to left LE develop strength, endurance, and ROM for 41 minutes including:  Scitfit lvl 2.6 x 6 min   Lunge stretch 6" box 10 x 10 s/h   Standing hip flexion, Straight leg raises, hip abduction, Hamstring curls, and Heel raises 2 x 10  Seated hamstring curls gr theraband tband 3 x 10   Seated LAQ 3 x 10 2#  Seated hip adduction with medium ball 3 x 10 3 s/h  Supine Quad sets 2 x 1 min with 10 s/h  Supine Straight leg raises, side lying hip abduction 2 x 10 2#  Supine knee flexion stretch with sheet 10 x 10 s/h    NOT TODAY-Hamstring stretch with strap x 10 with 10 s/h  NOT TODAY-Supine SAQ with bolster 3 x 10 with emphasis on pulling toes back to stretch hamstrings 2#    Patient Education and Home Exercises       Education provided:   -received verbal and tactile cues to facilitate proper execution of exercises and body mechanics. He was educated on how important it is to elevate and apply ice for decreased edema and joint effusion.    Written Home Exercises Provided: Patient instructed to cont prior HEP.     Assessment     AT end of his PT session, Davon asked PTA what he could do between " now and Monday for his exercises. PTA instructed him to perform his HEP that was previously given to him. Pt reports he walks far distances without his cane. PTA informed pt he needed to use his cane when walking out in the community or long distances until he builds his tolerance up and strength for his knee but pt insisted his son followed him with his walker and he just holds it walking 100 yards at a time several times a day. Pt able to perform all exercises listed above today with freq rest breaks reporting he is fatigued today.     Davon Is progressing well towards his goals.   Pt prognosis is Good.     Pt will continue to benefit from skilled outpatient physical therapy to address the deficits listed in the problem list box on initial evaluation, provide pt/family education and to maximize pt's level of independence in the home and community environment.     Pt's spiritual, cultural and educational needs considered and pt agreeable to plan of care and goals.     Anticipated barriers to physical therapy: none    Goals: Updated on 8/25/23  Short Term Goals: 2 week    Pt will increase left knee AROM flexion to 90 degrees, knee extension to -10 degrees, and quad lag to -10 degrees to allow patient normal gait pattern.  Pt will increase left knee strength to 3-/5 to improve functional mobility with all ADL's.    Long Term Goals: 2 weeks   1. Pt will increase left knee flexion to 100 degrees, knee extension to -5 degrees, and quad lag to -5 degrees to allow patient normal gait mechanics for community mobility.  2. Patient will increase left knee strength to 3+/5 to allow patient to safely return to prior level of function.    Plan     Continue with current plan of care.    Janine Aquino PTA

## 2023-09-06 ENCOUNTER — CLINICAL SUPPORT (OUTPATIENT)
Dept: REHABILITATION | Facility: HOSPITAL | Age: 73
End: 2023-09-06
Payer: MEDICARE

## 2023-09-06 DIAGNOSIS — M25.662 DECREASED RANGE OF MOTION (ROM) OF LEFT KNEE: Primary | ICD-10-CM

## 2023-09-06 PROCEDURE — 97110 THERAPEUTIC EXERCISES: CPT | Mod: CQ

## 2023-09-06 NOTE — PROGRESS NOTES
"[facility]   Physical Therapy Treatment Note      Name: Davon STAPLES [facility] Number: [id number]    Therapy Diagnosis:   Encounter Diagnosis   Name Primary?    Decreased range of motion (ROM) of left knee Yes     Physician: Erick Almendarez MD    Visit Date: 9/6/2023  Visit #: 14/22  PTA Visit #: 3/5     Time In: 0940  Time Out:1025  Total Billable Time: 45 minutes    Subjective     Pt reports: Pt reports he did not do much exercises over the holiday weekend.   He was instructed in home exercise program.  Response to previous treatment: good  Functional change: none    Pain: 0/10  Location: left knee      Objective      9/6/23  Range of Motion: supine   AROM   Left Knee Flexion  86   Left Knee Extension  -10       Treatment     Davon received the treatments listed below:      Therapeutic exercises to left LE develop strength, endurance, and ROM for 45 minutes including:  Scitfit lvl 2.6 x 6 min   Lunge stretch 6" box 10 x 10 s/h   Forward step ups on 6 inch box 2 x 10  Standing hip flexion, Straight leg raises, hip abduction, Hamstring curls, and Heel raises 2 x 10 1.5#  Seated hamstring curls gr theraband tband 3 x 10   Seated LAQ 3 x 10 2#  Seated hip adduction with medium ball 3 x 10 3 s/h  Supine Quad sets 2 x 1 min with 10 s/h  Supine Straight leg raises, side lying hip abduction 2 x 10 2#-- not today   Supine knee flexion stretch with sheet 10 x 10 s/h    NOT TODAY-Hamstring stretch with strap x 10 with 10 s/h  NOT TODAY-Supine SAQ with bolster 3 x 10 with emphasis on pulling toes back to stretch hamstrings 2#    Patient Education and Home Exercises       Education provided:   -received verbal and tactile cues to facilitate proper execution of exercises and body mechanics. He was educated on how important it is to elevate and apply ice for decreased edema and joint effusion.    Written Home Exercises Provided: Patient instructed to cont prior HEP.     Assessment     Pt's left " knee ROM slightly improved today with flexion 86 degrees and extension -11 degrees. Pt also encouraged to still use his SC out in community or for long distances for balance and safety. Pt demo understanding.     Davon Is progressing well towards his goals.   Pt prognosis is Good.     Pt will continue to benefit from skilled outpatient physical therapy to address the deficits listed in the problem list box on initial evaluation, provide pt/family education and to maximize pt's level of independence in the home and community environment.     Pt's spiritual, cultural and educational needs considered and pt agreeable to plan of care and goals.     Anticipated barriers to physical therapy: none    Goals: Updated on 8/25/23  Short Term Goals: 2 week    Pt will increase left knee AROM flexion to 90 degrees, knee extension to -10 degrees, and quad lag to -10 degrees to allow patient normal gait pattern.  Pt will increase left knee strength to 3-/5 to improve functional mobility with all ADL's.    Long Term Goals: 2 weeks   1. Pt will increase left knee flexion to 100 degrees, knee extension to -5 degrees, and quad lag to -5 degrees to allow patient normal gait mechanics for community mobility.  2. Patient will increase left knee strength to 3+/5 to allow patient to safely return to prior level of function.    Plan     Continue with current plan of care.    Janine Aquino, PTA

## 2023-09-07 ENCOUNTER — HOSPITAL ENCOUNTER (EMERGENCY)
Facility: HOSPITAL | Age: 73
Discharge: ANOTHER HOSPITAL | End: 2023-09-07
Payer: MEDICARE

## 2023-09-07 ENCOUNTER — CLINICAL SUPPORT (OUTPATIENT)
Dept: REHABILITATION | Facility: HOSPITAL | Age: 73
End: 2023-09-07
Payer: MEDICARE

## 2023-09-07 VITALS
BODY MASS INDEX: 37.22 KG/M2 | WEIGHT: 260 LBS | RESPIRATION RATE: 20 BRPM | HEART RATE: 60 BPM | DIASTOLIC BLOOD PRESSURE: 56 MMHG | OXYGEN SATURATION: 99 % | SYSTOLIC BLOOD PRESSURE: 133 MMHG | HEIGHT: 70 IN | TEMPERATURE: 98 F

## 2023-09-07 DIAGNOSIS — M25.662 DECREASED RANGE OF MOTION (ROM) OF LEFT KNEE: Primary | ICD-10-CM

## 2023-09-07 DIAGNOSIS — R06.02 SHORTNESS OF BREATH: Primary | ICD-10-CM

## 2023-09-07 DIAGNOSIS — M24.662 ANKYLOSIS OF LEFT KNEE: ICD-10-CM

## 2023-09-07 DIAGNOSIS — R53.1 WEAKNESS: ICD-10-CM

## 2023-09-07 DIAGNOSIS — R79.89 POSITIVE D DIMER: ICD-10-CM

## 2023-09-07 LAB
ALBUMIN SERPL BCP-MCNC: 3.6 G/DL (ref 3.5–5)
ALBUMIN/GLOB SERPL: 1 {RATIO}
ALP SERPL-CCNC: 93 U/L (ref 45–115)
ALT SERPL W P-5'-P-CCNC: 24 U/L (ref 16–61)
ANION GAP SERPL CALCULATED.3IONS-SCNC: 15 MMOL/L (ref 7–16)
APTT PPP: 27.3 SECONDS (ref 25.2–37.3)
AST SERPL W P-5'-P-CCNC: 17 U/L (ref 15–37)
BASOPHILS # BLD AUTO: 0.03 K/UL (ref 0–0.2)
BASOPHILS NFR BLD AUTO: 0.4 % (ref 0–1)
BILIRUB SERPL-MCNC: 0.4 MG/DL (ref ?–1.2)
BUN SERPL-MCNC: 40 MG/DL (ref 7–18)
BUN/CREAT SERPL: 21 (ref 6–20)
CALCIUM SERPL-MCNC: 9.3 MG/DL (ref 8.5–10.1)
CHLORIDE SERPL-SCNC: 106 MMOL/L (ref 98–107)
CO2 SERPL-SCNC: 24 MMOL/L (ref 21–32)
CREAT SERPL-MCNC: 1.89 MG/DL (ref 0.7–1.3)
D DIMER PPP FEU-MCNC: 1.94 ΜG/ML (ref 0–0.47)
DIFFERENTIAL METHOD BLD: ABNORMAL
EGFR (NO RACE VARIABLE) (RUSH/TITUS): 37 ML/MIN/1.73M2
EOSINOPHIL # BLD AUTO: 0.12 K/UL (ref 0–0.5)
EOSINOPHIL NFR BLD AUTO: 1.6 % (ref 1–4)
EOSINOPHIL NFR BLD MANUAL: 1 % (ref 1–4)
ERYTHROCYTE [DISTWIDTH] IN BLOOD BY AUTOMATED COUNT: 14.7 % (ref 11.5–14.5)
GLOBULIN SER-MCNC: 3.6 G/DL (ref 2–4)
GLUCOSE SERPL-MCNC: 155 MG/DL (ref 74–106)
HCT VFR BLD AUTO: 40.9 % (ref 40–54)
HGB BLD-MCNC: 13.3 G/DL (ref 13.5–18)
INR BLD: 0.86
LYMPHOCYTES # BLD AUTO: 2.34 K/UL (ref 1–4.8)
LYMPHOCYTES NFR BLD AUTO: 30.7 % (ref 27–41)
LYMPHOCYTES NFR BLD MANUAL: 31 % (ref 27–41)
MAGNESIUM SERPL-MCNC: 2 MG/DL (ref 1.7–2.3)
MCH RBC QN AUTO: 30.1 PG (ref 27–31)
MCHC RBC AUTO-ENTMCNC: 32.5 G/DL (ref 32–36)
MCV RBC AUTO: 92.5 FL (ref 80–96)
MONOCYTES # BLD AUTO: 0.89 K/UL (ref 0–0.8)
MONOCYTES NFR BLD AUTO: 11.7 % (ref 2–6)
MONOCYTES NFR BLD MANUAL: 12 % (ref 2–6)
MPC BLD CALC-MCNC: 10.5 FL (ref 9.4–12.4)
NEUTROPHILS # BLD AUTO: 4.24 K/UL (ref 1.8–7.7)
NEUTROPHILS NFR BLD AUTO: 55.6 % (ref 53–65)
NEUTS SEG NFR BLD MANUAL: 56 % (ref 50–62)
NRBC BLD MANUAL-RTO: ABNORMAL %
NT-PROBNP SERPL-MCNC: 314 PG/ML (ref 1–450)
PLATELET # BLD AUTO: 218 K/UL (ref 150–400)
PLATELET MORPHOLOGY: NORMAL
POTASSIUM SERPL-SCNC: 4.8 MMOL/L (ref 3.5–5.1)
PROT SERPL-MCNC: 7.2 G/DL (ref 6.4–8.2)
PROTHROMBIN TIME: 11.8 SECONDS (ref 11.7–14.7)
RBC # BLD AUTO: 4.42 M/UL (ref 4.6–6.2)
RBC MORPH BLD: NORMAL
SARS-COV-2 RDRP RESP QL NAA+PROBE: NEGATIVE
SODIUM SERPL-SCNC: 140 MMOL/L (ref 136–145)
TROPONIN I SERPL DL<=0.01 NG/ML-MCNC: 9.9 PG/ML
WBC # BLD AUTO: 7.62 K/UL (ref 4.5–11)

## 2023-09-07 PROCEDURE — 83880 ASSAY OF NATRIURETIC PEPTIDE: CPT

## 2023-09-07 PROCEDURE — 99284 EMERGENCY DEPT VISIT MOD MDM: CPT | Mod: ,,,

## 2023-09-07 PROCEDURE — 84484 ASSAY OF TROPONIN QUANT: CPT

## 2023-09-07 PROCEDURE — 93010 ELECTROCARDIOGRAM REPORT: CPT | Mod: ,,, | Performed by: FAMILY MEDICINE

## 2023-09-07 PROCEDURE — 83735 ASSAY OF MAGNESIUM: CPT

## 2023-09-07 PROCEDURE — 85610 PROTHROMBIN TIME: CPT

## 2023-09-07 PROCEDURE — 85025 COMPLETE CBC W/AUTO DIFF WBC: CPT

## 2023-09-07 PROCEDURE — 97110 THERAPEUTIC EXERCISES: CPT

## 2023-09-07 PROCEDURE — 93005 ELECTROCARDIOGRAM TRACING: CPT

## 2023-09-07 PROCEDURE — 85730 THROMBOPLASTIN TIME PARTIAL: CPT

## 2023-09-07 PROCEDURE — 27000221 HC OXYGEN, UP TO 24 HOURS

## 2023-09-07 PROCEDURE — 93010 PR ELECTROCARDIOGRAM REPORT: ICD-10-PCS | Mod: ,,, | Performed by: FAMILY MEDICINE

## 2023-09-07 PROCEDURE — 85379 FIBRIN DEGRADATION QUANT: CPT

## 2023-09-07 PROCEDURE — 25000003 PHARM REV CODE 250

## 2023-09-07 PROCEDURE — 80053 COMPREHEN METABOLIC PANEL: CPT

## 2023-09-07 PROCEDURE — 99284 PR EMERGENCY DEPT VISIT,LEVEL IV: ICD-10-PCS | Mod: ,,,

## 2023-09-07 PROCEDURE — 99285 EMERGENCY DEPT VISIT HI MDM: CPT | Mod: 25

## 2023-09-07 PROCEDURE — 87635 SARS-COV-2 COVID-19 AMP PRB: CPT

## 2023-09-07 RX ORDER — NAPROXEN SODIUM 220 MG/1
243 TABLET, FILM COATED ORAL
Status: COMPLETED | OUTPATIENT
Start: 2023-09-07 | End: 2023-09-07

## 2023-09-07 RX ADMIN — ASPIRIN 81 MG 243 MG: 81 TABLET ORAL at 11:09

## 2023-09-07 NOTE — ED NOTES
Call back form [facility] and she states they have no beds  
Called [facility] for transport  
FNP Viola in to update patient on plans to transfer to [facility]. Patient voiced understanding  
FNP paged [facility]  
ISADORA Rod on phone with [facility] about possible transfer of this patient  
[facility]  
Report to Yuko maldonado at [facility]  
No

## 2023-09-07 NOTE — PROGRESS NOTES
"[facility]   Physical Therapy Treatment Note      Name: Davon STAPLES [facility] Number: [id number]    Therapy Diagnosis:   Encounter Diagnoses   Name Primary?    Decreased range of motion (ROM) of left knee Yes    Ankylosis of left knee      Physician: Erick Almendarez MD    Visit Date: 9/7/2023  Visit #: 15/22  PTA Visit #: 3/5     Time In: 1055  Time Out:1120  Total Billable Time: 25 minutes    Subjective     Pt reports: No complaints with left knee. He did report he had low blood sugar this morning prior to arriving to therapy but he had a snack and tested his blood sugar prior to treatment.  He was instructed in home exercise program.  Response to previous treatment: good  Functional change: none    Pain: 0/10  Location: left knee      Objective      9/6/23  Range of Motion: supine   AROM   Left Knee Flexion  86   Left Knee Extension  -10       Treatment     Davon received the treatments listed below:      Therapeutic exercises to left LE develop strength, endurance, and ROM for 20 minutes including:  Scitfit lvl 2.8 x 6 min   Lunge stretch 6" box 10 x 10 s/h   Standing hip flexion 2 x 101.5#    Therapist had to terminate therapy due to patient becoming weak and SOB. The exercises listed below were not performed:  Forward step ups on 6 inch box 2 x 10  Straight leg raises, hip abduction, Hamstring curls, and Heel raises 2 x 10 1.5#  Seated hamstring curls gr theraband tband 3 x 10   Seated LAQ 3 x 10 2#  Seated hip adduction with medium ball 3 x 10 3 s/h  Supine Quad sets 2 x 1 min with 10 s/h  Supine Straight leg raises, side lying hip abduction 2 x 10 2#-- not today   Supine knee flexion stretch with sheet 10 x 10 s/h    NOT TODAY-Hamstring stretch with strap x 10 with 10 s/h  NOT TODAY-Supine SAQ with bolster 3 x 10 with emphasis on pulling toes back to stretch hamstrings 2#    Patient Education and Home Exercises       Education provided:   -received verbal and tactile cues " to facilitate proper execution of exercises and body mechanics. He was educated on how important it is to elevate and apply ice for decreased edema and joint effusion.    Written Home Exercises Provided: Patient instructed to cont prior HEP.     Assessment     Davon reported feeling weak and short of breath during treatment and therapist terminated the session for today. Therapist took vital signs which included:  /58  O2 saturation 94%  HR 75 bpm  Therapist recommended that he should see a nurse or doctor and he agreed to go to the ER. Patient was escorted to ER window by therapist and he was checked in.    Davon Is progressing well towards his goals.   Pt prognosis is Good.     Pt will continue to benefit from skilled outpatient physical therapy to address the deficits listed in the problem list box on initial evaluation, provide pt/family education and to maximize pt's level of independence in the home and community environment.     Pt's spiritual, cultural and educational needs considered and pt agreeable to plan of care and goals.     Anticipated barriers to physical therapy: none    Goals: Updated on 8/25/23  Short Term Goals: 2 week    Pt will increase left knee AROM flexion to 90 degrees, knee extension to -10 degrees, and quad lag to -10 degrees to allow patient normal gait pattern.  Pt will increase left knee strength to 3-/5 to improve functional mobility with all ADL's.    Long Term Goals: 2 weeks   1. Pt will increase left knee flexion to 100 degrees, knee extension to -5 degrees, and quad lag to -5 degrees to allow patient normal gait mechanics for community mobility.  2. Patient will increase left knee strength to 3+/5 to allow patient to safely return to prior level of function.    Plan     Continue with current plan of care.    Adin Kaminski, PT

## 2023-09-12 ENCOUNTER — CLINICAL SUPPORT (OUTPATIENT)
Dept: REHABILITATION | Facility: HOSPITAL | Age: 73
End: 2023-09-12
Payer: MEDICARE

## 2023-09-12 DIAGNOSIS — M25.662 DECREASED RANGE OF MOTION (ROM) OF LEFT KNEE: Primary | ICD-10-CM

## 2023-09-12 PROCEDURE — 97110 THERAPEUTIC EXERCISES: CPT | Mod: CQ

## 2023-09-12 NOTE — PROGRESS NOTES
[facility]   Physical Therapy Treatment Note      Name: Davon STAPLES [facility] Number: [id number]    Therapy Diagnosis:   Encounter Diagnosis   Name Primary?    Decreased range of motion (ROM) of left knee Yes     Physician: Erick Almendarez MD    Visit Date: 9/12/2023  Visit #: 16/22  PTA Visit #: 1/5     Time In: 0936  Time Out: 1010  Total Billable Time: 34 minutes    Subjective     Pt reports: I still feel not myself like I don't feel confident on doing my PT today but we will see.   He was instructed in home exercise program.  Response to previous treatment: went [facility] during PT session due to SOB and extreme weakness then transferred out to [facility] in [place].   Functional change: none    Pain: 0/10  Location: left knee      Objective      9/6/23  Range of Motion: supine   AROM   Left Knee Flexion  86   Left Knee Extension  -10       Treatment     Davon received the treatments listed below:      Therapeutic exercises to left LE develop strength, endurance, and ROM for 34 minutes including:  Scitfit lvl 1.6  x 5 min   Seated hamstring curls gr theraband tband 3 x 10   Seated LAQ 3 x 10 2#  Seated hip adduction with medium ball 3 x 10 3 s/h  Supine Quad sets 2 x 1 min with 10 s/h  Supine Straight leg raises, side lying hip abduction 2 x 10   Supine knee flexion stretch with sheet 10 x 10 s/h    Patient Education and Home Exercises       Education provided:   -received verbal and tactile cues to facilitate proper execution of exercises and body mechanics.   Written Home Exercises Provided: Patient instructed to cont prior HEP.     Assessment     Pt experienced no SOB or reports of feeling extremely weak or dizzy. Freq rest breaks throughout session. Pt performed all seated and supine exercises fairly well today.     Davon Is progressing well towards his goals.   Pt prognosis is Good.     Pt will continue to benefit from skilled outpatient physical therapy to  address the deficits listed in the problem list box on initial evaluation, provide pt/family education and to maximize pt's level of independence in the home and community environment.     Pt's spiritual, cultural and educational needs considered and pt agreeable to plan of care and goals.     Anticipated barriers to physical therapy: none    Goals: Updated on 8/25/23  Short Term Goals: 2 week    Pt will increase left knee AROM flexion to 90 degrees, knee extension to -10 degrees, and quad lag to -10 degrees to allow patient normal gait pattern. Partially Met.  Pt will increase left knee strength to 3-/5 to improve functional mobility with all ADL's.    Long Term Goals: 2 weeks   1. Pt will increase left knee flexion to 100 degrees, knee extension to -5 degrees, and quad lag to -5 degrees to allow patient normal gait mechanics for community mobility.  2. Patient will increase left knee strength to 3+/5 to allow patient to safely return to prior level of function.    Plan     Continue with current Plan of Care.    Janine Aquino, PTA

## 2023-09-19 ENCOUNTER — CLINICAL SUPPORT (OUTPATIENT)
Dept: REHABILITATION | Facility: HOSPITAL | Age: 73
End: 2023-09-19
Payer: MEDICARE

## 2023-09-19 DIAGNOSIS — M24.662 ANKYLOSIS OF LEFT KNEE: ICD-10-CM

## 2023-09-19 DIAGNOSIS — M25.662 DECREASED RANGE OF MOTION (ROM) OF LEFT KNEE: Primary | ICD-10-CM

## 2023-09-19 PROCEDURE — 97110 THERAPEUTIC EXERCISES: CPT

## 2023-09-19 NOTE — PROGRESS NOTES
[facility]   Physical Therapy Treatment Note      Name: Davon STAPLES [facility] Number: [id number]    Therapy Diagnosis:   Encounter Diagnoses   Name Primary?    Decreased range of motion (ROM) of left knee Yes    Ankylosis of left knee      Physician: Erick Almendarez MD    Visit Date: 9/19/2023  Visit #: 16/22  PTA Visit #: 1/5     Time In: 1004  Time Out: 1042  Total Billable Time: 38 minutes    Subjective     Pt reports: He feels much better.  He was instructed in home exercise program.  Response to previous treatment: good  Functional change: none    Pain: 0/10  Location: left knee      Objective      9/19/23  Range of Motion: supine   AROM strength   Left Knee Flexion  91 3+   Left Knee Extension  -13 4-       Treatment     Davon received the treatments listed below:      Therapeutic exercises to left LE develop strength, endurance, and ROM for 38 minutes including:  NOT TODAY-Scitfit lvl 1.6  x 5 min   Seated hamstring curls gr theraband  3 x 10   Seated LAQ 3 x 10 2#  Seated hip adduction with medium ball 3 x 10 3 s/h  Supine Quad sets 2 x 1 min with 10 s/h  Supine Straight leg raises, side lying hip abduction 2 x 10 2#  Supine knee flexion stretch with sheet 10 x 10 s/h  Standing SLR, hip abduction, hip flexion and knee flexion 2 x 10      Patient Education and Home Exercises       Education provided:   -received verbal and tactile cues to facilitate proper execution of exercises and body mechanics.   Written Home Exercises Provided: Patient instructed to cont prior HEP and increase his flexion and extension exercises to 3 x per day.    Assessment     Davon was able to demonstrate improved left knee flexion today per objective measurements. Hamstring and quadriceps strength improved as well.    Davon Is progressing well towards his goals.   Pt prognosis is Good.     Pt will continue to benefit from skilled outpatient physical therapy to address the deficits listed in the  problem list box on initial evaluation, provide pt/family education and to maximize pt's level of independence in the home and community environment.     Pt's spiritual, cultural and educational needs considered and pt agreeable to plan of care and goals.     Anticipated barriers to physical therapy: none    Goals: Updated on 8/25/23  Short Term Goals: 2 week    Pt will increase left knee AROM flexion to 90 degrees, knee extension to -10 degrees, and quad lag to -10 degrees to allow patient normal gait pattern. Partially Met for flexion.  Pt will increase left knee strength to 3-/5 to improve functional mobility with all ADL's. Goal Met.    Long Term Goals: 2 weeks   1. Pt will increase left knee flexion to 100 degrees, knee extension to -5 degrees, and quad lag to -5 degrees to allow patient normal gait mechanics for community mobility.  2. Patient will increase left knee strength to 3+/5 to allow patient to safely return to prior level of function.    Plan     Continue with current Plan of Care.    Adin Kaminski, PT

## 2023-09-21 ENCOUNTER — CLINICAL SUPPORT (OUTPATIENT)
Dept: REHABILITATION | Facility: HOSPITAL | Age: 73
End: 2023-09-21
Payer: MEDICARE

## 2023-09-21 DIAGNOSIS — M25.662 DECREASED RANGE OF MOTION (ROM) OF LEFT KNEE: Primary | ICD-10-CM

## 2023-09-21 PROCEDURE — 97110 THERAPEUTIC EXERCISES: CPT | Mod: CQ

## 2023-09-21 NOTE — PROGRESS NOTES
[facility]   Physical Therapy Treatment Note      Name: Davon STAPLES [facility] Number: [id number]    Therapy Diagnosis:   Encounter Diagnosis   Name Primary?    Decreased range of motion (ROM) of left knee Yes     Physician: Erick Almendarez MD    Visit Date: 9/21/2023  Visit #: 18/22  PTA Visit #: 1/5     Time In: 1015  Time Out: 1100  Total Billable Time: 45 minutes    Subjective     Pt reports: No new complaints today   He was instructed in home exercise program.  Response to previous treatment: good  Functional change: none    Pain: 0/10  Location: left knee      Objective      9/19/23  Range of Motion: supine   AROM strength   Left Knee Flexion  91 3+   Left Knee Extension  -13 4-       Treatment     Davon received the treatments listed below:      Therapeutic exercises to left LE develop strength, endurance, and ROM for 45 minutes including:  Scitfit lvl 2.0  x 6 min   Seated hamstring curls gr theraband 3 x 10   Seated Long Arc Quad 3 x 10 2#  Seated hip adduction with medium ball 3 x 10 3 s/h  Standing Straight leg raises, hip abduction, hip flexion, and Hamstring curls 2 x 10  Standing heel raises and mini squats 2 x 10   Supine Quad sets 2 x 1 min with 10 s/h  Supine Straight leg raises, side lying hip abduction 2 x 10 2#  Supine heel slides with sheet 10 x 10 s/h    Patient Education and Home Exercises       Education provided:   -received verbal and tactile cues to facilitate proper execution of exercises and body mechanics.   Written Home Exercises Provided: Patient instructed to cont prior HEP and increase his flexion and extension exercises to 3 x per day.    Assessment     Davon able to tolerate more standing exercises today without extreme difficulty. Freq rest breaks were required due to muscle fatigue.     Davon Is progressing well towards his goals.   Pt prognosis is Good.     Pt will continue to benefit from skilled outpatient physical therapy to address the  deficits listed in the problem list box on initial evaluation, provide pt/family education and to maximize pt's level of independence in the home and community environment.     Pt's spiritual, cultural and educational needs considered and pt agreeable to plan of care and goals.     Anticipated barriers to physical therapy: none    Goals: Updated on 8/25/23  Short Term Goals: 2 week    Pt will increase left knee AROM flexion to 90 degrees, knee extension to -10 degrees, and quad lag to -10 degrees to allow patient normal gait pattern. Partially Met for flexion.  Pt will increase left knee strength to 3-/5 to improve functional mobility with all ADL's. Goal Met.    Long Term Goals: 2 weeks   1. Pt will increase left knee flexion to 100 degrees, knee extension to -5 degrees, and quad lag to -5 degrees to allow patient normal gait mechanics for community mobility.  2. Patient will increase left knee strength to 3+/5 to allow patient to safely return to prior level of function.    Plan     Continue with current Plan of Care.    Janine Aquino, PTA

## 2023-09-25 ENCOUNTER — CLINICAL SUPPORT (OUTPATIENT)
Dept: REHABILITATION | Facility: HOSPITAL | Age: 73
End: 2023-09-25
Payer: MEDICARE

## 2023-09-25 DIAGNOSIS — M24.662 ANKYLOSIS OF LEFT KNEE: ICD-10-CM

## 2023-09-25 DIAGNOSIS — M25.662 DECREASED RANGE OF MOTION (ROM) OF LEFT KNEE: Primary | ICD-10-CM

## 2023-09-25 PROCEDURE — 97110 THERAPEUTIC EXERCISES: CPT

## 2023-09-25 NOTE — PROGRESS NOTES
[facility]   Physical Therapy Treatment Note      Name: Davon STAPLES [facility] Number: [id number]    Therapy Diagnosis:   Encounter Diagnoses   Name Primary?    Decreased range of motion (ROM) of left knee Yes    Ankylosis of left knee      Physician: Erick Almendarez MD    Visit Date: 9/25/2023  Visit #: 19/22  PTA Visit #: 1/5     Time In: 1358  Time Out: 1438  Total Billable Time: 40 minutes    Subjective     Pt reports: My knee is a little sore today.  He was instructed in home exercise program.  Response to previous treatment: good  Functional change: none    Pain: 3/10  Location: left knee      Objective      9/25/23  Range of Motion: supine   AROM PROM Strength   Left Knee Flexion  95 97 3-   Left Knee Extension  -10 -8 4-       Treatment     Davon received the treatments listed below:      Therapeutic exercises to left LE develop strength, endurance, and ROM for 40 minutes including:  Scitfit lvl 2.0  x 6 min   Seated hamstring curls gr theraband 3 x 10   Seated Long Arc Quad 3 x 10 2#  Seated hip adduction with medium ball 3 x 10 3 s/h  Standing Straight leg raises, hip abduction, hip flexion, and Hamstring curls 2 x 10  Standing heel raises and mini squats 2 x 10   Supine Quad sets 2 x 1 min with 10 s/h  Supine Straight leg raises, side lying hip abduction 3 x 10 2#  Supine heel slides with sheet 10 x 10 s/h    Patient Education and Home Exercises       Education provided:   -received verbal and tactile cues to facilitate proper execution of exercises and body mechanics.     Written Home Exercises Provided: Patient instructed to cont prior HEP.    Assessment     Davon was able to demonstrate some improvement in left knee ROM. Frequent rest breaks were required due to muscle fatigue.     Davon Is progressing well towards his goals.   Pt prognosis is Good.     Pt will continue to benefit from skilled outpatient physical therapy to address the deficits listed in the  problem list box on initial evaluation, provide pt/family education and to maximize pt's level of independence in the home and community environment.     Pt's spiritual, cultural and educational needs considered and pt agreeable to plan of care and goals.     Anticipated barriers to physical therapy: none    Goals: Updated on 8/25/23  Short Term Goals: 2 week    Pt will increase left knee AROM flexion to 90 degrees, knee extension to -10 degrees, and quad lag to -10 degrees to allow patient normal gait pattern. Partially Met for flexion.  Pt will increase left knee strength to 3-/5 to improve functional mobility with all ADL's. Goal Met.    Long Term Goals: 2 weeks   1. Pt will increase left knee flexion to 100 degrees, knee extension to -5 degrees, and quad lag to -5 degrees to allow patient normal gait mechanics for community mobility.  2. Patient will increase left knee strength to 3+/5 to allow patient to safely return to prior level of function.    Plan     Continue with current Plan of Care.    Adin Kaminski, PT

## 2023-09-27 DIAGNOSIS — Z79.4 TYPE 2 DIABETES MELLITUS WITH MICROALBUMINURIA, WITH LONG-TERM CURRENT USE OF INSULIN: ICD-10-CM

## 2023-09-27 DIAGNOSIS — R80.9 TYPE 2 DIABETES MELLITUS WITH MICROALBUMINURIA, WITH LONG-TERM CURRENT USE OF INSULIN: ICD-10-CM

## 2023-09-27 DIAGNOSIS — E11.29 TYPE 2 DIABETES MELLITUS WITH MICROALBUMINURIA, WITH LONG-TERM CURRENT USE OF INSULIN: ICD-10-CM

## 2023-09-27 RX ORDER — DAPAGLIFLOZIN 10 MG/1
10 TABLET, FILM COATED ORAL DAILY
Qty: 30 TABLET | Refills: 0 | Status: SHIPPED | OUTPATIENT
Start: 2023-09-27 | End: 2023-11-27 | Stop reason: SDUPTHER

## 2023-09-28 ENCOUNTER — CLINICAL SUPPORT (OUTPATIENT)
Dept: REHABILITATION | Facility: HOSPITAL | Age: 73
End: 2023-09-28
Payer: MEDICARE

## 2023-09-28 DIAGNOSIS — M25.662 DECREASED RANGE OF MOTION (ROM) OF LEFT KNEE: Primary | ICD-10-CM

## 2023-09-28 PROCEDURE — 97110 THERAPEUTIC EXERCISES: CPT | Mod: CQ

## 2023-09-28 NOTE — PROGRESS NOTES
[facility]   Physical Therapy Treatment Note      Name: Davon STAPLES [facility] Number: [id number]    Therapy Diagnosis:   Encounter Diagnosis   Name Primary?    Decreased range of motion (ROM) of left knee Yes     Physician: Erick Almendarez MD    Visit Date: 9/28/2023  Visit #: 20/22  PTA Visit #: 1/5     Time In: 1025  Time Out: 1110  Total Billable Time: 45 minutes    Subjective     Pt reports: I feel like I can do more with my knee then before.   He was instructed in home exercise program.  Response to previous treatment: good  Functional change: none    Pain: 0/10  Location: left knee      Objective      9/25/23  Range of Motion: supine   AROM PROM Strength   Left Knee Flexion  95 97 3-   Left Knee Extension  -10 -8 4-       Treatment     Davon received the treatments listed below:      Therapeutic exercises to left LE develop strength, endurance, and ROM for 45 minutes including:  Scitfit lvl 2.2 x 5 min   Seated hamstring curls gr theraband 3 x 10   Seated Long Arc Quad 3 x 10 and seated hip flexion 2 x 10 2#  Seated hip adduction with medium ball 3 x 10 3 s/h  Supine Quad sets 2 x 1 min with 10 s/h  Supine Straight leg raises, side lying hip abduction 2 x 10 2#  Supine heel slides with sheet 10 x 10 s/h  Standing Straight leg raises, hip abduction, hip flexion, and Hamstring curls 2 x 10  Standing heel raises and mini squats 2 x 10     Patient Education and Home Exercises       Education provided:   -received verbal and tactile cues to facilitate proper execution of exercises and body mechanics.     Written Home Exercises Provided: Patient instructed to cont prior HEP.    Assessment     Davon requires freq seated rest breaks during his standing activities reporting he gets short winded and has poor endurance. No acute distress noted end of session.     Davon Is progressing well towards his goals.   Pt prognosis is Good.     Pt will continue to benefit from skilled  outpatient physical therapy to address the deficits listed in the problem list box on initial evaluation, provide pt/family education and to maximize pt's level of independence in the home and community environment.     Pt's spiritual, cultural and educational needs considered and pt agreeable to plan of care and goals.     Anticipated barriers to physical therapy: none    Goals: Updated on 8/25/23  Short Term Goals: 2 week    Pt will increase left knee AROM flexion to 90 degrees, knee extension to -10 degrees, and quad lag to -10 degrees to allow patient normal gait pattern. Partially Met.  Pt will increase left knee strength to 3-/5 to improve functional mobility with all ADL's. Goal Met.    Long Term Goals: 2 weeks   1. Pt will increase left knee flexion to 100 degrees, knee extension to -5 degrees, and quad lag to -5 degrees to allow patient normal gait mechanics for community mobility.  2. Patient will increase left knee strength to 3+/5 to allow patient to safely return to prior level of function.    Plan     Continue with current Plan of Care.    Janine Aquino, PTA

## 2023-09-30 ENCOUNTER — EXTERNAL CHRONIC CARE MANAGEMENT (OUTPATIENT)
Dept: FAMILY MEDICINE | Facility: CLINIC | Age: 73
End: 2023-09-30
Payer: MEDICARE

## 2023-09-30 PROCEDURE — G0511 CCM/BHI BY RHC/FQHC 20MIN MO: HCPCS | Mod: ,,, | Performed by: FAMILY MEDICINE

## 2023-09-30 PROCEDURE — G0511 PR CHRONIC CARE MGMT, RHC OR FQHC ONLY, 20 MINS OR MORE: ICD-10-PCS | Mod: ,,, | Performed by: FAMILY MEDICINE

## 2023-10-03 ENCOUNTER — CLINICAL SUPPORT (OUTPATIENT)
Dept: REHABILITATION | Facility: HOSPITAL | Age: 73
End: 2023-10-03
Payer: MEDICARE

## 2023-10-03 DIAGNOSIS — M25.662 DECREASED RANGE OF MOTION (ROM) OF LEFT KNEE: Primary | ICD-10-CM

## 2023-10-03 PROCEDURE — 97110 THERAPEUTIC EXERCISES: CPT | Mod: CQ

## 2023-10-03 NOTE — PROGRESS NOTES
[facility]   Physical Therapy Treatment Note      Name: Davon STAPLES [facility] Number: [id number]    Therapy Diagnosis:   Encounter Diagnosis   Name Primary?    Decreased range of motion (ROM) of left knee Yes     Physician: Erick Almendarez MD    Visit Date: 10/3/2023  Visit #: 21/22  PTA Visit #: 2/5     Time In: 1011  Time Out: 1100  Total Billable Time: 49 minutes    Subjective     Pt reports: I think I figured out why I get so fatigued and SOB, I haven't been wearing my CPAP machine at night so I am going to clean it and start back tonight.   He was instructed in home exercise program.  Response to previous treatment: good  Functional change: none    Pain: 0/10  Location: left knee      Objective      9/25/23  Range of Motion: supine   AROM PROM Strength   Left Knee Flexion  95 97 3-   Left Knee Extension  -10 -8 4-       Treatment     Davon received the treatments listed below:      Therapeutic exercises to left LE develop strength, endurance, and ROM for 49 minutes including:  Scitfit lvl 2.5 x 5 min   Seated hamstring curls gr theraband 3 x 10   Seated Long Arc Quad and hip flexion 3 x 10  Seated hip adduction with medium ball 3 x 10 3 s/h  Supine Quad sets 2 x 1 min with 10 s/h  Supine Straight leg raises, side lying hip abduction 2 x 10 2#  Supine heel slides with sheet 10 x 10 s/h  Forward step ups 2 x 10 on 6 inch step   Standing Straight leg raises, hip abduction, and Hamstring curls 2 x 10  Standing heel raises and mini squats 2 x 10     Patient Education and Home Exercises       Education provided:   -received verbal and tactile cues to facilitate proper execution of exercises and body mechanics.     Written Home Exercises Provided: Patient instructed to cont prior HEP.    Assessment     Davon reported 3 out 10 on pain scale while performing heel slides but tolerable. Pt able to tolerate step ups on 6 inch box without extreme difficulty did demo mild SOB but with  seated rest break able to finish out his PT.     Davon Is progressing well towards his goals.   Pt prognosis is Good.     Pt will continue to benefit from skilled outpatient physical therapy to address the deficits listed in the problem list box on initial evaluation, provide pt/family education and to maximize pt's level of independence in the home and community environment.     Pt's spiritual, cultural and educational needs considered and pt agreeable to plan of care and goals.     Anticipated barriers to physical therapy: none    Goals: Updated on 8/25/23  Short Term Goals: 2 week    Pt will increase left knee AROM flexion to 90 degrees, knee extension to -10 degrees, and quad lag to -10 degrees to allow patient normal gait pattern. Partially Met.  Pt will increase left knee strength to 3-/5 to improve functional mobility with all ADL's. Goal Met.    Long Term Goals: 2 weeks   1. Pt will increase left knee flexion to 100 degrees, knee extension to -5 degrees, and quad lag to -5 degrees to allow patient normal gait mechanics for community mobility.  2. Patient will increase left knee strength to 3+/5 to allow patient to safely return to prior level of function.    Plan     Continue with current Plan of Care.    Janine Aquino, PTA

## 2023-10-12 ENCOUNTER — DOCUMENTATION ONLY (OUTPATIENT)
Dept: REHABILITATION | Facility: HOSPITAL | Age: 73
End: 2023-10-12
Payer: MEDICARE

## 2023-10-12 ENCOUNTER — CLINICAL SUPPORT (OUTPATIENT)
Dept: REHABILITATION | Facility: HOSPITAL | Age: 73
End: 2023-10-12
Payer: MEDICARE

## 2023-10-12 DIAGNOSIS — M24.662 ANKYLOSIS OF LEFT KNEE: ICD-10-CM

## 2023-10-12 DIAGNOSIS — M25.662 DECREASED RANGE OF MOTION (ROM) OF LEFT KNEE: Primary | ICD-10-CM

## 2023-10-12 PROCEDURE — 97110 THERAPEUTIC EXERCISES: CPT | Mod: CQ

## 2023-10-12 NOTE — PROGRESS NOTES
[facility]   Physical Therapy Treatment Note      Name: Davon STAPLES [facility] Number: [id number]    Therapy Diagnosis:   Encounter Diagnoses   Name Primary?    Decreased range of motion (ROM) of left knee Yes    Ankylosis of left knee      Physician: Erick Almendarez MD    Visit Date: 10/12/2023  Visit #: 22/22  PTA Visit #: 2/5     Time In: 1100 AM  Time Out: 1145 AM  Total Billable Time: 45 minutes    Subjective     Pt reports: no pain today  He was instructed in home exercise program.  Response to previous treatment: good  Functional change: none    Pain: 0/10  Location: left knee      Objective      9/25/23  Range of Motion: supine   AROM PROM Strength   Left Knee Flexion  95 97 3-   Left Knee Extension  -10 -8 4-       Treatment     Davon received the treatments listed below:      Therapeutic exercises to left LE develop strength, endurance, and ROM for 45 minutes including:  Scitfit lvl 2.5 x 5 min   HS stretch on mat 5x20 sec   Seated hamstring curls gr theraband 3 x 10   Seated Long Arc Quad and hip flexion 3 x 10 with 2#  Seated hip adduction with medium ball 3 x 10 3 s/h (not today)  Supine Quad sets 3x10 3 sec GTB  Supine Straight leg raises, side lying hip abduction 3 x 10 2#  Supine heel slides with sheet 10 x 10 s/h  Forward step ups 2 x 10 on 6 inch step   WC bumps 10x10 sec   Standing lunges 10x10 sec  Standing Straight leg raises, hip abduction, and Hamstring curls 2 x 10  Standing heel raises and mini squats 2 x 10     Patient Education and Home Exercises       Education provided:   -received verbal and tactile cues to facilitate proper execution of exercises and body mechanics.     Written Home Exercises Provided: Patient instructed to cont prior HEP.    Assessment     Davon tolerated Tx well today, PTA provided skilled cues for proper posture and positioning to prevent hip hiking with knee flexion.    Davon Is progressing well towards his goals.   Pt  prognosis is Good.     Pt will continue to benefit from skilled outpatient physical therapy to address the deficits listed in the problem list box on initial evaluation, provide pt/family education and to maximize pt's level of independence in the home and community environment.     Pt's spiritual, cultural and educational needs considered and pt agreeable to plan of care and goals.     Anticipated barriers to physical therapy: none    Goals: Updated on 8/25/23  Short Term Goals: 2 week    Pt will increase left knee AROM flexion to 90 degrees, knee extension to -10 degrees, and quad lag to -10 degrees to allow patient normal gait pattern. Partially Met.  Pt will increase left knee strength to 3-/5 to improve functional mobility with all ADL's. Goal Met.    Long Term Goals: 2 weeks   1. Pt will increase left knee flexion to 100 degrees, knee extension to -5 degrees, and quad lag to -5 degrees to allow patient normal gait mechanics for community mobility.  2. Patient will increase left knee strength to 3+/5 to allow patient to safely return to prior level of function.    Plan     Continue with current Plan of Care.    Elizabeth Dwyer, PTA

## 2023-10-12 NOTE — PROGRESS NOTES
[facility]  Physical Therapy Plan of Care Note     Name: Davon STAPLES [facility] Number: [id number]    Visit Date: 10/12/2023     Decreased range of motion (ROM) of left knee Yes    Ankylosis of left knee        Physician: Erick Almendarez MD     Visit Date: 8/25/2023     Physician Orders: Eval and Treat 3 x per week for 4 weeks  Medical Diagnosis from Referral: Pain left knee; presence of left artificial knee joint;   Ankylosis, left knee and s/p left knee arthroscopy with lysis of adhesions and manipulation under anesthesia.  Evaluation Date: 8/10/23  Authorization Period Expiration: 9/22/23   Plan of Care Expiration: 9/22/23     Precautions: Standard  Functional Level Prior to Evaluation:  independent    Subjective     Update: Davon is participating well in therapy and is reporting 0/10 left knee pain.     Objective      Update:   Range of Motion: supine    AROM PROM Strength   Left Knee Flexion  95 97 3-   Left Knee Extension  -10 -8 4-           Assessment     Update: Davon has met some of his STG's but still having difficulty achieving his LTG of knee ROM and strength.    Previous Short Term Goals Status:   Partially Met.  New Short Term Goals Status:   Continue  Long Term Goal Status: continue per initial plan of care.  Reasons for Recertification of Therapy:   orders to continue with therapy    GOALS  Short Term Goals: 2 week     Pt will increase left knee AROM flexion to 90 degrees, knee extension to -10 degrees, and quad lag to -10 degrees to allow patient normal gait pattern. Partially Met.  Pt will increase left knee strength to 3-/5 to improve functional mobility with all ADL's. Goal Met.     Long Term Goals: 2 weeks   1. Pt will increase left knee flexion to 100 degrees, knee extension to -5 degrees, and quad lag to -5 degrees to allow patient normal gait mechanics for community mobility.  2. Patient will increase left knee strength to 3+/5 to allow patient to safely  return to prior level of function.    Plan     Updated Certification Period: 10/11/23 to 11/10/23   Recommended Treatment Plan: 3 times per week for 4 weeks:  Manual Therapy, Moist Heat/ Ice, Neuromuscular Re-ed, Patient Education, and Therapeutic Exercise  Other Recommendations: none    Adin Kaminski, PT       I CERTIFY THE NEED FOR THESE SERVICES FURNISHED UNDER THIS PLAN OF TREATMENT AND WHILE UNDER MY CARE   Physician's comments:     Physician's Signature: ___________________________________________________

## 2023-10-17 ENCOUNTER — CLINICAL SUPPORT (OUTPATIENT)
Dept: REHABILITATION | Facility: HOSPITAL | Age: 73
End: 2023-10-17
Payer: MEDICARE

## 2023-10-17 DIAGNOSIS — M24.662 ANKYLOSIS OF LEFT KNEE: ICD-10-CM

## 2023-10-17 DIAGNOSIS — M25.662 DECREASED RANGE OF MOTION (ROM) OF LEFT KNEE: Primary | ICD-10-CM

## 2023-10-17 PROCEDURE — 97110 THERAPEUTIC EXERCISES: CPT

## 2023-10-17 NOTE — PROGRESS NOTES
[facility]   Physical Therapy Treatment Note      Name: Davon STAPLES [facility] Number: [id number]    Therapy Diagnosis:   Encounter Diagnoses   Name Primary?    Decreased range of motion (ROM) of left knee Yes    Ankylosis of left knee      Physician: Erick Almendarez MD  Physician Orders: Eval and Treat 3 x per week for 4 weeks  Medical Diagnosis from Referral: Pain left knee; presence of left artificial knee joint;   Ankylosis, left knee and s/p left knee arthroscopy with lysis of adhesions and manipulation under anesthesia.  Evaluation Date: 8/10/23  Authorization Period Expiration: 10/11/23   Plan of Care Expiration: 11/10/23     Visit Date: 10/17/2023  Visit #: 23/34  PTA Visit #: 0/5     Time In: 1015   Time Out: 1105  Total Billable Time: 50 minutes    Subjective     Pt reports: no pain today  He was instructed in home exercise program.  Response to previous treatment: good  Functional change: none    Pain: 0/10  Location: left knee      Objective      Range of Motion: 10/17/23    AROM PROM Strength   Left Knee Flexion  97 100 3-   Left Knee Extension  -8 -5 4-         Treatment     Davon received the treatments listed below:      Therapeutic exercises to left LE develop strength, endurance, and ROM for 45 minutes including:  Scitfit lvl 3.0 x 6 min   Not today-HS stretch on mat 5x20 sec   Seated hamstring curls blue theraband 3 x 15  Seated Long Arc Quad and hip flexion 3 x 15 with 2.5#  Supine Quad sets 3x10 3 sec GTB  Supine Straight leg raises, side lying hip abduction 3 x 10 2.5#  Supine heel slides with sheet 10 x 10 s/h  Forward step ups 2 x 10 on 6 inch step   Standing lunges 10x10 sec  Standing Straight leg raises, hip abduction, and Hamstring curls 2 x 10 2.5#  Standing heel raises and mini squats 2 x 10     Patient Education and Home Exercises       Education provided:   -received verbal and tactile cues to facilitate proper execution of exercises and body  mechanics.     Written Home Exercises Provided: Patient instructed to cont prior HEP.    Assessment     Davon was able to improve slightly on his left knee ROM today. He has met his STG's and will continue with therapy to advance towards LTG's.    Davon Is progressing well towards his goals.   Pt prognosis is Good.     Pt will continue to benefit from skilled outpatient physical therapy to address the deficits listed in the problem list box on initial evaluation, provide pt/family education and to maximize pt's level of independence in the home and community environment.     Pt's spiritual, cultural and educational needs considered and pt agreeable to plan of care and goals.     Anticipated barriers to physical therapy: none    Goals: Updated on 10/11/23  Short Term Goals: 2 week    Pt will increase left knee AROM flexion to 90 degrees, knee extension to -10 degrees, and quad lag to -10 degrees to allow patient normal gait pattern. Goal Met.  Pt will increase left knee strength to 3-/5 to improve functional mobility with all ADL's. Goal Met.    Long Term Goals: 4 weeks   1. Pt will increase left knee flexion to 100 degrees, knee extension to -5 degrees, and quad lag to -5 degrees to allow patient normal gait mechanics for community mobility.  2. Patient will increase left knee strength to 3+/5 to allow patient to safely return to prior level of function.    Plan     Updated Certification Period: 10/11/23 to 11/10/23   Recommended Treatment Plan: 3 times per week for 4 weeks:  Manual Therapy, Moist Heat/ Ice, Neuromuscular Re-ed, Patient Education, and Therapeutic Exercise  Other Recommendations: none    Adin Kaminski, PT

## 2023-10-18 ENCOUNTER — OFFICE VISIT (OUTPATIENT)
Dept: FAMILY MEDICINE | Facility: CLINIC | Age: 73
End: 2023-10-18
Payer: MEDICARE

## 2023-10-18 VITALS
DIASTOLIC BLOOD PRESSURE: 56 MMHG | SYSTOLIC BLOOD PRESSURE: 130 MMHG | HEART RATE: 54 BPM | BODY MASS INDEX: 37.31 KG/M2 | OXYGEN SATURATION: 95 % | HEIGHT: 70 IN | RESPIRATION RATE: 19 BRPM | TEMPERATURE: 98 F

## 2023-10-18 DIAGNOSIS — Z79.4 TYPE 2 DIABETES MELLITUS WITH MICROALBUMINURIA, WITH LONG-TERM CURRENT USE OF INSULIN: ICD-10-CM

## 2023-10-18 DIAGNOSIS — M06.9 RHEUMATOID ARTHRITIS, INVOLVING UNSPECIFIED SITE, UNSPECIFIED WHETHER RHEUMATOID FACTOR PRESENT: ICD-10-CM

## 2023-10-18 DIAGNOSIS — E11.29 TYPE 2 DIABETES MELLITUS WITH MICROALBUMINURIA, WITH LONG-TERM CURRENT USE OF INSULIN: ICD-10-CM

## 2023-10-18 DIAGNOSIS — R80.9 TYPE 2 DIABETES MELLITUS WITH MICROALBUMINURIA, WITH LONG-TERM CURRENT USE OF INSULIN: ICD-10-CM

## 2023-10-18 DIAGNOSIS — E66.01 MORBID (SEVERE) OBESITY DUE TO EXCESS CALORIES: ICD-10-CM

## 2023-10-18 DIAGNOSIS — E78.5 HYPERLIPIDEMIA, UNSPECIFIED HYPERLIPIDEMIA TYPE: ICD-10-CM

## 2023-10-18 DIAGNOSIS — G30.9 ALZHEIMER'S DISEASE, UNSPECIFIED (CODE): ICD-10-CM

## 2023-10-18 DIAGNOSIS — N18.31 STAGE 3A CHRONIC KIDNEY DISEASE: ICD-10-CM

## 2023-10-18 DIAGNOSIS — C64.9 MALIGNANT NEOPLASM OF KIDNEY EXCLUDING RENAL PELVIS, UNSPECIFIED LATERALITY: Primary | ICD-10-CM

## 2023-10-18 LAB
CHOLEST SERPL-MCNC: 155 MG/DL (ref 0–200)
CHOLEST/HDLC SERPL: 3.4 {RATIO}
EST. AVERAGE GLUCOSE BLD GHB EST-MCNC: 174 MG/DL
HBA1C MFR BLD HPLC: 7.8 % (ref 4.5–6.6)
HDLC SERPL-MCNC: 45 MG/DL (ref 40–60)
LDLC SERPL CALC-MCNC: 89 MG/DL
LDLC/HDLC SERPL: 2 {RATIO}
NONHDLC SERPL-MCNC: 110 MG/DL
TRIGL SERPL-MCNC: 107 MG/DL (ref 35–150)
VLDLC SERPL-MCNC: 21 MG/DL

## 2023-10-18 PROCEDURE — 80061 LIPID PANEL: CPT | Mod: ,,, | Performed by: CLINICAL MEDICAL LABORATORY

## 2023-10-18 PROCEDURE — 3075F PR MOST RECENT SYSTOLIC BLOOD PRESS GE 130-139MM HG: ICD-10-PCS | Mod: ,,, | Performed by: FAMILY MEDICINE

## 2023-10-18 PROCEDURE — 1160F PR REVIEW ALL MEDS BY PRESCRIBER/CLIN PHARMACIST DOCUMENTED: ICD-10-PCS | Mod: ,,, | Performed by: FAMILY MEDICINE

## 2023-10-18 PROCEDURE — 83036 HEMOGLOBIN GLYCOSYLATED A1C: CPT | Mod: ,,, | Performed by: CLINICAL MEDICAL LABORATORY

## 2023-10-18 PROCEDURE — 3051F PR MOST RECENT HEMOGLOBIN A1C LEVEL 7.0 - < 8.0%: ICD-10-PCS | Mod: ,,, | Performed by: FAMILY MEDICINE

## 2023-10-18 PROCEDURE — 1160F RVW MEDS BY RX/DR IN RCRD: CPT | Mod: ,,, | Performed by: FAMILY MEDICINE

## 2023-10-18 PROCEDURE — 1101F PT FALLS ASSESS-DOCD LE1/YR: CPT | Mod: ,,, | Performed by: FAMILY MEDICINE

## 2023-10-18 PROCEDURE — 99213 PR OFFICE/OUTPT VISIT, EST, LEVL III, 20-29 MIN: ICD-10-PCS | Mod: ,,, | Performed by: FAMILY MEDICINE

## 2023-10-18 PROCEDURE — 80061 LIPID PANEL: ICD-10-PCS | Mod: ,,, | Performed by: CLINICAL MEDICAL LABORATORY

## 2023-10-18 PROCEDURE — 1126F PR PAIN SEVERITY QUANTIFIED, NO PAIN PRESENT: ICD-10-PCS | Mod: ,,, | Performed by: FAMILY MEDICINE

## 2023-10-18 PROCEDURE — 4010F PR ACE/ARB THEARPY RXD/TAKEN: ICD-10-PCS | Mod: ,,, | Performed by: FAMILY MEDICINE

## 2023-10-18 PROCEDURE — 1159F MED LIST DOCD IN RCRD: CPT | Mod: ,,, | Performed by: FAMILY MEDICINE

## 2023-10-18 PROCEDURE — 3288F PR FALLS RISK ASSESSMENT DOCUMENTED: ICD-10-PCS | Mod: ,,, | Performed by: FAMILY MEDICINE

## 2023-10-18 PROCEDURE — 1159F PR MEDICATION LIST DOCUMENTED IN MEDICAL RECORD: ICD-10-PCS | Mod: ,,, | Performed by: FAMILY MEDICINE

## 2023-10-18 PROCEDURE — 83036 HEMOGLOBIN A1C: ICD-10-PCS | Mod: ,,, | Performed by: CLINICAL MEDICAL LABORATORY

## 2023-10-18 PROCEDURE — 1126F AMNT PAIN NOTED NONE PRSNT: CPT | Mod: ,,, | Performed by: FAMILY MEDICINE

## 2023-10-18 PROCEDURE — 4010F ACE/ARB THERAPY RXD/TAKEN: CPT | Mod: ,,, | Performed by: FAMILY MEDICINE

## 2023-10-18 PROCEDURE — 1101F PR PT FALLS ASSESS DOC 0-1 FALLS W/OUT INJ PAST YR: ICD-10-PCS | Mod: ,,, | Performed by: FAMILY MEDICINE

## 2023-10-18 PROCEDURE — 3288F FALL RISK ASSESSMENT DOCD: CPT | Mod: ,,, | Performed by: FAMILY MEDICINE

## 2023-10-18 PROCEDURE — 3078F PR MOST RECENT DIASTOLIC BLOOD PRESSURE < 80 MM HG: ICD-10-PCS | Mod: ,,, | Performed by: FAMILY MEDICINE

## 2023-10-18 PROCEDURE — 3051F HG A1C>EQUAL 7.0%<8.0%: CPT | Mod: ,,, | Performed by: FAMILY MEDICINE

## 2023-10-18 PROCEDURE — 3078F DIAST BP <80 MM HG: CPT | Mod: ,,, | Performed by: FAMILY MEDICINE

## 2023-10-18 PROCEDURE — 99213 OFFICE O/P EST LOW 20 MIN: CPT | Mod: ,,, | Performed by: FAMILY MEDICINE

## 2023-10-18 PROCEDURE — 3075F SYST BP GE 130 - 139MM HG: CPT | Mod: ,,, | Performed by: FAMILY MEDICINE

## 2023-10-18 RX ORDER — DULAGLUTIDE 0.75 MG/.5ML
INJECTION, SOLUTION SUBCUTANEOUS
COMMUNITY

## 2023-10-18 RX ORDER — INSULIN ASPART 100 [IU]/ML
INJECTION, SUSPENSION SUBCUTANEOUS
COMMUNITY

## 2023-10-18 RX ORDER — BRIMONIDINE TARTRATE 1.5 MG/ML
SOLUTION/ DROPS OPHTHALMIC
COMMUNITY

## 2023-10-18 NOTE — ASSESSMENT & PLAN NOTE
Will continue to monitor renal function and encouraged the patient to avoid all nephrotoxic substances and increased water intake.

## 2023-10-18 NOTE — PROGRESS NOTES
Davon Barrios is a 73 y.o. male seen today for follow-up on his diabetes and hyperlipidemia.  Patient is fasting today for follow-up blood work.  Patient's A1c may be a little elevated since his wife is recovering from surgery and has not been able to cook properly for him lately.  Already his diet is improved over the last couple of weeks.  Unfortunately we do not have the 65+ flu vaccine or the Prevnar 20 available today and I have recommended these as well as RSV and COVID vaccine at his local pharmacy.  Patient does have a history of prostate cancer and renal cell carcinoma and already has a follow-up scheduled with Oncology.       Past Medical History:   Diagnosis Date    Carcinoma of prostate     Hypertension     Sleep apnea     Type 2 diabetes mellitus     Vitamin B 12 deficiency(non anemic)      Family History   Problem Relation Age of Onset    Diabetes Mother     Rectal cancer Father     Diabetes Father     Hypertension Father     Lung cancer Father     Hypertension Sister     Hypertension Brother      Current Outpatient Medications on File Prior to Visit   Medication Sig Dispense Refill    atorvastatin (LIPITOR) 10 MG tablet TAKE 1 TABLET BY MOUTH ONCE A DAY AS DIRECTED 90 tablet 1    baclofen (LIORESAL) 5 mg Tab tablet Take 1 tablet (5 mg total) by mouth every evening. 90 tablet 1    carvediloL (COREG) 3.125 MG tablet Take 1 tablet (3.125 mg total) by mouth 2 (two) times daily. 90 tablet 1    citalopram (CELEXA) 20 MG tablet TAKE 1 TABLET (20 MG TOTAL) BY MOUTH ONCE DAILY. 90 tablet 1    dapagliflozin propanediol (FARXIGA) 10 mg tablet Take 1 tablet (10 mg total) by mouth once daily. 30 tablet 0    dulaglutide (TRULICITY) 0.75 mg/0.5 mL pen injector       enalapril (VASOTEC) 20 MG tablet Take 1 tablet (20 mg total) by mouth once daily. as directed 90 tablet 1    gabapentin (NEURONTIN) 300 MG capsule Take 1 capsule (300 mg total) by mouth 3 (three) times daily. (Patient taking differently: Take 3  "capsules by mouth every morning and take 2 capsules by mouth every evening) 270 capsule 1    ADULT LOW DOSE ASPIRIN ORAL Take 81 mg by mouth once daily.      ascorbic Acid (VITAMIN C) 500 mg CpSR Take 500 mg by mouth once daily.      brimonidine 0.15 % OPTH DROP (ALPHAGAN) 0.15 % ophthalmic solution       insulin asp prt-insulin aspart, NovoLOG 70/30, (NOVOLOG MIX 70-30 U-100 INSULN) 100 unit/mL (70-30) Soln       insulin NPH-insulin regular, 70/30, (NOVOLIN 70/30) 100 unit/mL (70-30) injection Inject 42 units subcutaneously every morning and inject 30 units subcutaneously every evening      insulin syringe-needle U-100 (BD INSULIN SYRINGE) 1 mL 25 gauge x 5/8" Syrg BD Insulin Syringe 1 mL 25 gauge x 5/8"      latanoprost 0.005 % ophthalmic solution latanoprost 0.005 % eye drops      vitamin E 100 UNIT capsule Take 100 capsules by mouth once daily.       No current facility-administered medications on file prior to visit.     Immunization History   Administered Date(s) Administered    COVID-19, MRNA, LN-S, PF (MODERNA FULL 0.5 ML DOSE) 03/05/2021, 04/06/2021, 11/19/2021    Influenza - High Dose - PF (65 years and older) 03/01/2021    Influenza - Quadrivalent - PF *Preferred* (6 months and older) 09/16/2021    Influenza Split 01/01/2015    Pneumococcal Conjugate - 13 Valent 03/01/2021    Pneumococcal Polysaccharide - 23 Valent 09/03/2014       Review of Systems   Constitutional:  Negative for fever, malaise/fatigue and weight loss.   Respiratory:  Negative for shortness of breath.    Cardiovascular:  Negative for chest pain and palpitations.   Gastrointestinal:  Negative for nausea and vomiting.   Musculoskeletal:  Positive for joint pain and myalgias. Negative for falls.   Psychiatric/Behavioral:  Negative for depression.         Vitals:    10/18/23 0939   BP: (!) 130/56   Pulse: (!) 54   Resp: 19   Temp: 97.6 °F (36.4 °C)       Physical Exam  Vitals reviewed.   Constitutional:       Appearance: Normal appearance. "   HENT:      Head: Normocephalic.   Eyes:      Extraocular Movements: Extraocular movements intact.      Conjunctiva/sclera: Conjunctivae normal.      Pupils: Pupils are equal, round, and reactive to light.   Neck:      Thyroid: No thyroid mass or thyromegaly.   Cardiovascular:      Rate and Rhythm: Normal rate and regular rhythm.      Heart sounds: Normal heart sounds. No murmur heard.     No gallop.   Pulmonary:      Effort: Pulmonary effort is normal. No respiratory distress.      Breath sounds: Normal breath sounds. No wheezing or rales.   Musculoskeletal:      Comments: He is slow to rise from a seated position with a cane dependent and antalgic gait.   Skin:     General: Skin is warm and dry.      Coloration: Skin is not jaundiced or pale.   Neurological:      Mental Status: He is alert.   Psychiatric:         Mood and Affect: Mood normal.         Behavior: Behavior normal.         Thought Content: Thought content normal.         Judgment: Judgment normal.          Assessment and Plan  1. Malignant neoplasm of kidney excluding renal pelvis, unspecified laterality  Assessment & Plan:  We will continue to monitor renal function and he will continue to follow-up as needed with Oncology      2. Morbid (severe) obesity due to excess calories  Assessment & Plan:  Will continue to encouraged the patient to cut back on caloric intake and stay as active as possible.      3. Rheumatoid arthritis, involving unspecified site, unspecified whether rheumatoid factor present  Assessment & Plan:  We will continue to monitor for worsening symptoms and will offer a rheumatology evaluation.      4. Alzheimer's disease, unspecified (CODE)  Assessment & Plan:  Will continue to monitor the patient for decline and encouraged him to take his current medications      5. Stage 3a chronic kidney disease  Assessment & Plan:  Will continue to monitor renal function and encouraged the patient to avoid all nephrotoxic substances and  increased water intake.      6. Type 2 diabetes mellitus with microalbuminuria, with long-term current use of insulin  -     Hemoglobin A1C; Future; Expected date: 10/18/2023    7. Hyperlipidemia, unspecified hyperlipidemia type  -     Lipid Panel; Future; Expected date: 10/18/2023             Return to clinic in 6 months or as needed once his lab work is in.  Again we expect his A1c maybe slightly elevated but this should improve.    Health Maintenance Topics with due status: Not Due       Topic Last Completion Date    Colorectal Cancer Screening 01/19/2022    Low Dose Statin 05/10/2023

## 2023-10-18 NOTE — ASSESSMENT & PLAN NOTE
Will continue to monitor the patient for decline and encouraged him to take his current medications

## 2023-10-18 NOTE — ASSESSMENT & PLAN NOTE
Will continue to encouraged the patient to cut back on caloric intake and stay as active as possible.

## 2023-10-18 NOTE — ASSESSMENT & PLAN NOTE
We will continue to monitor renal function and he will continue to follow-up as needed with Oncology

## 2023-10-19 ENCOUNTER — TELEPHONE (OUTPATIENT)
Dept: FAMILY MEDICINE | Facility: CLINIC | Age: 73
End: 2023-10-19
Payer: MEDICARE

## 2023-10-19 ENCOUNTER — CLINICAL SUPPORT (OUTPATIENT)
Dept: REHABILITATION | Facility: HOSPITAL | Age: 73
End: 2023-10-19
Payer: MEDICARE

## 2023-10-19 DIAGNOSIS — M25.662 DECREASED RANGE OF MOTION (ROM) OF LEFT KNEE: Primary | ICD-10-CM

## 2023-10-19 DIAGNOSIS — M24.662 ANKYLOSIS OF LEFT KNEE: ICD-10-CM

## 2023-10-19 PROCEDURE — 97110 THERAPEUTIC EXERCISES: CPT | Mod: KX

## 2023-10-19 NOTE — PROGRESS NOTES
[facility]   Physical Therapy Treatment Note      Name: Davon STAPLES [facility] Number: [id number]    Therapy Diagnosis:   Encounter Diagnoses   Name Primary?    Decreased range of motion (ROM) of left knee Yes    Ankylosis of left knee      Physician: Erick Almendarez MD  Physician Orders: Eval and Treat 3 x per week for 4 weeks  Medical Diagnosis from Referral: Pain left knee; presence of left artificial knee joint;   Ankylosis, left knee and s/p left knee arthroscopy with lysis of adhesions and manipulation under anesthesia.  Evaluation Date: 8/10/23  Authorization Period Expiration: 10/11/23   Plan of Care Expiration: 11/10/23     Visit Date: 10/19/2023  Visit #: 24/34  PTA Visit #: 0/5     Time In: 1022  Time Out: 1100  Total Billable Time: 38 minutes    Subjective     Pt reports: no complaints.  He was compliant with home exercise program.  Response to previous treatment: good  Functional change: none    Pain: 0/10  Location: left knee      Objective      Range of Motion: 10/17/23    AROM PROM Strength   Left Knee Flexion  97 100 3-   Left Knee Extension  -8 -5 4-         Treatment     Davon received the treatments listed below:      Therapeutic exercises to left LE develop strength, endurance, and ROM for 38 minutes including:  Not today-Scitfit lvl 3.0 x 6 min   Not today-HS stretch on mat 5x20 sec   Seated hamstring curls blue theraband 2 x 15  Seated Long Arc Quad and hip flexion 3 x 15 with 2.5#  Supine Quad sets 2 x10 3 sec GTB  Supine Straight leg raises, side lying hip abduction 3 x 10 2.5#  Supine heel slides with sheet 10 x 10 s/h  Forward step ups 2 x 10 on 8 inch step   Standing lunge stretch 8 inch 10x10 sec  Standing Straight leg raises, hip abduction, and Hamstring curls 2 x 10 2.5#  Standing heel raises and mini squats 2 x 10     Patient Education and Home Exercises       Education provided:   -received intermittent verbal and tactile cues to facilitate proper  execution of exercises and body mechanics.     Written Home Exercises Provided: Patient instructed to cont prior HEP.    Assessment     Davon continues to tolerate therapy well with progressive ROM and strengthening. His left knee is still stiff with flexion ROM but slowly improving.    Davon Is progressing slowly towards his goals.   Pt prognosis is Good.     Pt will continue to benefit from skilled outpatient physical therapy to address the deficits listed in the problem list box on initial evaluation, provide pt/family education and to maximize pt's level of independence in the home and community environment.     Pt's spiritual, cultural and educational needs considered and pt agreeable to plan of care and goals.     Anticipated barriers to physical therapy: none    Goals: Updated on 10/11/23  Short Term Goals: 2 week    Pt will increase left knee AROM flexion to 90 degrees, knee extension to -10 degrees, and quad lag to -10 degrees to allow patient normal gait pattern. Goal Met.  Pt will increase left knee strength to 3-/5 to improve functional mobility with all ADL's. Goal Met.    Long Term Goals: 4 weeks   1. Pt will increase left knee flexion to 100 degrees, knee extension to -5 degrees, and quad lag to -5 degrees to allow patient normal gait mechanics for community mobility.  2. Patient will increase left knee strength to 3+/5 to allow patient to safely return to prior level of function.    Plan     Updated Certification Period: 10/11/23 to 11/10/23   Recommended Treatment Plan: 3 times per week for 4 weeks:  Manual Therapy, Moist Heat/ Ice, Neuromuscular Re-ed, Patient Education, and Therapeutic Exercise  Other Recommendations: none    Adin Kaminski, PT

## 2023-10-19 NOTE — TELEPHONE ENCOUNTER
----- Message from Oc Song MD sent at 10/19/2023  7:52 AM CDT -----  Good A1c but mildly elevated LDL.  Recommend decreasing his intake of fatty and high carb content foods and recheck his A1c and lipids in 6 months

## 2023-10-20 ENCOUNTER — TELEPHONE (OUTPATIENT)
Dept: FAMILY MEDICINE | Facility: CLINIC | Age: 73
End: 2023-10-20
Payer: MEDICARE

## 2023-10-23 ENCOUNTER — CLINICAL SUPPORT (OUTPATIENT)
Dept: REHABILITATION | Facility: HOSPITAL | Age: 73
End: 2023-10-23
Payer: MEDICARE

## 2023-10-23 DIAGNOSIS — M25.662 DECREASED RANGE OF MOTION (ROM) OF LEFT KNEE: Primary | ICD-10-CM

## 2023-10-23 PROCEDURE — 97110 THERAPEUTIC EXERCISES: CPT | Mod: KX,CQ

## 2023-10-23 NOTE — PROGRESS NOTES
[facility]   Physical Therapy Treatment Note      Name: Davon STAPLES [facility] Number: [id number]    Therapy Diagnosis:   Encounter Diagnosis   Name Primary?    Decreased range of motion (ROM) of left knee Yes     Physician: Erick Almendarez MD  Physician Orders: Eval and Treat 3 x per week for 4 weeks  Medical Diagnosis from Referral: Pain left knee; presence of left artificial knee joint;   Ankylosis, left knee and s/p left knee arthroscopy with lysis of adhesions and manipulation under anesthesia.  Evaluation Date: 8/10/23  Authorization Period Expiration: 10/11/23   Plan of Care Expiration: 11/10/23     Visit Date: 10/23/2023  Visit #: 25/34  PTA Visit #: 1/5     Time In: 0933  Time Out: 1012  Total Billable Time: 39 minutes     Received Plan of Care per Adin Kaminski PT    Subjective     Pt reports: no complaints.  He was compliant with home exercise program.  Response to previous treatment: good  Functional change: ongoing    Pain: 0/10  Location: left knee      Objective      Range of Motion: 10/23/23    AROM PROM Strength   Left Knee Flexion  98 103 3-   Left Knee Extension  -5 -5 4-     Quad lag: 10 degrees with long arc quad    Strength not assessed today    Treatment     Davon received the treatments listed below:      Therapeutic exercises to left LE develop strength, endurance, and ROM for 39 minutes including:  Scitfit lvl 3.0 x 6 minutes    Not today-HS stretch on mat 5x20 sec   Standing bilateral calf stretch on slant board x 2 minutes   Standing hamstring stretch on step, 3x20 second hold   Supine knee flexion stretching, 3x20 second hold   Seated hamstring stretching with heel propped on chair, 3x20 second hold   Not today Seated hamstring curls blue theraband 2 x 15  Seated Long Arc Quad and hip flexion 3 x 15 with 2.5#  Not today Supine Quad sets 2 x10 3 sec GTB  Not today Supine Straight leg raises, side lying hip abduction 3 x 10 2.5#  Seated heel slides with  towel 20 x 10 s/h for knee flexion stretching, and quad/hamstring strengthening  Not today Forward step ups 2 x 10 on 8 inch step   Not today Standing lunge stretch 8 inch 10x10 sec  Not today Standing Straight leg raises, hip abduction, and Hamstring curls 2 x 10 2.5#  Standing heel/toe raises and mini squats 2 x 10   Standing marching and hip abduction x 20 repetitions each  Long arc quads x 30 with blue band   Seated marching x 30 repetitions   Sit to stand x 10 repetitions with no BUE support    Patient Education and Home Exercises       Education provided:   -received intermittent verbal and tactile cues to facilitate proper execution of exercises and body mechanics.     Written Home Exercises Provided: Patient instructed to cont prior HEP.    Assessment     Improved active range of motion flexion and extension, with improved passive range of motion flexion   Pt will benefit from continued work on strength, range of motion and balance  Pt slow with exercises, easily fatigued and requires frequent sitting rest breaks   Davon Is progressing slowly towards his goals.   Pt prognosis is Good.     Pt will continue to benefit from skilled outpatient physical therapy to address the deficits listed in the problem list box on initial evaluation, provide pt/family education and to maximize pt's level of independence in the home and community environment.      Anticipated barriers to physical therapy: none    Goals: Updated on 10/11/23  Short Term Goals: 2 week  Pt will increase left knee AROM flexion to 90 degrees, knee extension to -10 degrees, and quad lag to -10 degrees to allow patient normal gait pattern. Goal Met.  Pt will increase left knee strength to 3-/5 to improve functional mobility with all ADL's. Goal Met.    Long Term Goals: 4 weeks   1. Pt will increase left knee flexion to 100 degrees, knee extension to -5 degrees, and quad lag to -5 degrees to allow patient normal gait mechanics for community  mobility.  2. Patient will increase left knee strength to 3+/5 to allow patient to safely return to prior level of function.    Plan     Updated Certification Period: 10/11/23 to 11/10/23   Recommended Treatment Plan: 3 times per week for 4 weeks:  Manual Therapy, Moist Heat/ Ice, Neuromuscular Re-ed, Patient Education, and Therapeutic Exercise    Continue per Plan of Care and progress as pt able   Alice Nails, PTA    10/23/2023

## 2023-10-25 ENCOUNTER — CLINICAL SUPPORT (OUTPATIENT)
Dept: REHABILITATION | Facility: HOSPITAL | Age: 73
End: 2023-10-25
Payer: MEDICARE

## 2023-10-25 DIAGNOSIS — M25.662 DECREASED RANGE OF MOTION (ROM) OF LEFT KNEE: Primary | ICD-10-CM

## 2023-10-25 DIAGNOSIS — M24.662 ANKYLOSIS OF LEFT KNEE: ICD-10-CM

## 2023-10-25 PROCEDURE — 97110 THERAPEUTIC EXERCISES: CPT

## 2023-10-25 NOTE — PROGRESS NOTES
[facility]   Physical Therapy Treatment Note      Name: Davon STAPLES [facility] Number: [id number]    Therapy Diagnosis:   Encounter Diagnoses   Name Primary?    Decreased range of motion (ROM) of left knee Yes    Ankylosis of left knee      Physician: Erick Almendarez MD  Physician Orders: Eval and Treat 3 x per week for 4 weeks  Medical Diagnosis from Referral: Pain left knee; presence of left artificial knee joint;   Ankylosis, left knee and s/p left knee arthroscopy with lysis of adhesions and manipulation under anesthesia.  Evaluation Date: 8/10/23  Authorization Period Expiration: 10/11/23   Plan of Care Expiration: 11/10/23     Visit Date: 10/25/2023  Visit #: 25/34  PTA Visit #: /5     Time In: 0920  Time Out: 1100  Total Billable Time: 40 minutes     Received Plan of Care per Adin Kaminski, PT    Subjective     Pt reports: My low back is hurting this morning but it doesn't hurt now.  He was compliant with home exercise program.  Response to previous treatment: good  Functional change: ongoing    Pain: 0/10  Location: left knee      Objective      Range of Motion: 10/25/23    AROM PROM Strength   Left Knee Flexion  95 105 4 within available ROM   Left Knee Extension  -5 -5 5 within available ROM     Quad lag: -12 degrees with long arc quad        Treatment     Davon received the treatments listed below:      Therapeutic exercises to left LE develop strength, endurance, and ROM for 40 minutes including:     Seated hamstring stretching with heel propped on chair, 3x20 second hold   Seated hamstring curls blue theraband 2 x 15  Seated Long Arc Quad and hip flexion 3 x 15 with 3#  Standing lunge stretch 8 inch 10x10 sec  Standing calf stretch on slant board 1 x 2 minutes  Supine knee flexion stretching, 3x20 second hold     Not today Supine Quad sets 2 x10 3 sec GTB  Not today Supine Straight leg raises, side lying hip abduction 3 x 10 2.5#  Not today Forward step ups 2 x 10 on 8  inch step   Not today Standing Straight leg raises, hip abduction, and Hamstring curls 2 x 10 2.5#  Not today Standing bilateral calf stretch on slant board 2 x 1 minutes   Not today Standing heel/toe raises and mini squats 2 x 10   Not todayStanding marching and hip abduction x 20 repetitions each  Not todaySeated marching x 30 repetitions   Not today Sit to stand x 10 repetitions with no BUE support    Patient Education and Home Exercises       Education provided:   -received intermittent verbal and tactile cues to facilitate proper execution of exercises and body mechanics.     Written Home Exercises Provided: Patient instructed to cont prior HEP.    Assessment     Davon's left knee was stiff and tight today which resulted in a slight decrease in his AROM measurements. Patient required frequent sitting rest breaks due to fatigue. Therapist will measure again next visit and determine if his left knee will benefit from further therapy sessions. He has met his strength goal for his left knee.    Davon Is progressing slowly towards his goals.   Pt prognosis is Good.     Pt will continue to benefit from skilled outpatient physical therapy to address the deficits listed in the problem list box on initial evaluation, provide pt/family education and to maximize pt's level of independence in the home and community environment.      Anticipated barriers to physical therapy: none    Goals: Updated on 10/11/23  Short Term Goals: 2 week  Pt will increase left knee AROM flexion to 90 degrees, knee extension to -10 degrees, and quad lag to -10 degrees to allow patient normal gait pattern. Goal Met.  Pt will increase left knee strength to 3-/5 to improve functional mobility with all ADL's. Goal Met.    Long Term Goals: 4 weeks   1. Pt will increase left knee flexion to 100 degrees, knee extension to -5 degrees, and quad lag to -5 degrees to allow patient normal gait mechanics for community mobility.  2. Patient will increase  left knee strength to 3+/5 to allow patient to safely return to prior level of function. Goal Met.    Plan     Updated Certification Period: 10/11/23 to 11/10/23   Recommended Treatment Plan: 3 times per week for 4 weeks:  Manual Therapy, Moist Heat/ Ice, Neuromuscular Re-ed, Patient Education, and Therapeutic Exercise    Continue per Plan of Care and progress as pt able   Adin Kaminski, PT    10/23/2023

## 2023-10-26 ENCOUNTER — TELEPHONE (OUTPATIENT)
Dept: FAMILY MEDICINE | Facility: CLINIC | Age: 73
End: 2023-10-26
Payer: MEDICARE

## 2023-10-26 NOTE — TELEPHONE ENCOUNTER
The pt's wife returned call - Shaneka Barrios. (HIPPA verified)  She was informed of pt.'s recent lab results.Good A1c but mildly elevated LDL and Dr. Song recommend's decreasing his intake of fatty/ high carb content foods and recheck his A1c and lipids in 6 months. She voices understanding.

## 2023-10-30 ENCOUNTER — CLINICAL SUPPORT (OUTPATIENT)
Dept: REHABILITATION | Facility: HOSPITAL | Age: 73
End: 2023-10-30
Payer: MEDICARE

## 2023-10-30 ENCOUNTER — DOCUMENTATION ONLY (OUTPATIENT)
Dept: REHABILITATION | Facility: HOSPITAL | Age: 73
End: 2023-10-30
Payer: MEDICARE

## 2023-10-30 DIAGNOSIS — M24.662 ANKYLOSIS OF LEFT KNEE: ICD-10-CM

## 2023-10-30 DIAGNOSIS — M25.662 DECREASED RANGE OF MOTION (ROM) OF LEFT KNEE: Primary | ICD-10-CM

## 2023-10-30 PROCEDURE — 97110 THERAPEUTIC EXERCISES: CPT | Mod: KX

## 2023-10-30 NOTE — PROGRESS NOTES
[facility]  Physical Discharge Note    Therapy Diagnosis:        Encounter Diagnoses   Name Primary?    Decreased range of motion (ROM) of left knee Yes    Ankylosis of left knee        Physician: Erick Almendarez MD  Physician Orders: Eval and Treat 3 x per week for 4 weeks  Medical Diagnosis from Referral: Pain left knee; presence of left artificial knee joint;   Ankylosis, left knee and s/p left knee arthroscopy with lysis of adhesions and manipulation under anesthesia.  Evaluation Date: 8/10/23  Date of Last visit: 10/30/23  Total Visits Received: 27    Objective       Range of Motion: 10/30/23    AROM PROM Strength   Left Knee Flexion  95 100 4 within available ROM   Left Knee Extension  -5 -5 5 within available ROM      Quad lag: -12 degrees with long arc quad       ASSESSMENT      Davon's left knee has achieved maximal ROM and strength at this time. He will discharge from skilled services and continue with his home exercise program.       Discharge reason: Patient has reached the maximum rehab potential for the present time    Discharge FOTO Score: 51    Goals:   Goals: Updated on 10/11/23  Short Term Goals: 2 week  Pt will increase left knee AROM flexion to 90 degrees, knee extension to -10 degrees, and quad lag to -10 degrees to allow patient normal gait pattern. Goal Met.  Pt will increase left knee strength to 3-/5 to improve functional mobility with all ADL's. Goal Met.     Long Term Goals: 4 weeks   1. Pt will increase left knee flexion to 100 degrees, knee extension to -5 degrees, and quad lag to -5 degrees to allow patient normal gait mechanics for community mobility.  2. Patient will increase left knee strength to 3+/5 to allow patient to safely return to prior level of function. Goal Met.       PLAN   This patient is discharged from Physical Therapy      Adin Kaminski, PT

## 2023-10-30 NOTE — PROGRESS NOTES
[facility]   Physical Therapy Treatment Note      Name: Davon STAPLES [facility] Number: [id number]    Therapy Diagnosis:   Encounter Diagnoses   Name Primary?    Decreased range of motion (ROM) of left knee Yes    Ankylosis of left knee      Physician: Erick Almendarez MD  Physician Orders: Eval and Treat 3 x per week for 4 weeks  Medical Diagnosis from Referral: Pain left knee; presence of left artificial knee joint;   Ankylosis, left knee and s/p left knee arthroscopy with lysis of adhesions and manipulation under anesthesia.  Evaluation Date: 8/10/23  Authorization Period Expiration: 10/11/23   Plan of Care Expiration: 11/10/23     Visit Date: 10/30/2023  Visit #: 27/34  PTA Visit #: 0/5     Time In: 0936  Time Out: 1015  Total Billable Time: 39 minutes       Subjective     Pt reports: no complaints.  He was compliant with home exercise program.  Response to previous treatment: good  Functional change: ongoing    Pain: 0/10  Location: left knee      Objective      Range of Motion: 10/30/23    AROM PROM Strength   Left Knee Flexion  95 100 4 within available ROM   Left Knee Extension  -5 -5 5 within available ROM     Quad lag: -12 degrees with long arc quad        Treatment     Davon received the treatments listed below:      Therapeutic exercises to left LE develop strength, endurance, and ROM for 39 minutes including:   SciFit stepper 5 min at level 3  Seated hamstring curls blue theraband 3 x 15  Seated Long Arc Quad and hip flexion 3 x 15 with 3#  Standing lunge stretch 8 inch 10x10 sec  Standing calf stretch on slant board 3 x 30 minutes  Supine knee flexion stretching, 10 x 20 second hold   Supine Straight leg raises, side lying hip abduction 3 x 10 3#      Patient Education and Home Exercises       Education provided:   -received intermittent verbal and tactile cues to facilitate proper execution of exercises and body mechanics.     Written Home Exercises Provided: Patient  instructed to cont prior HEP.    Assessment     Davon's left knee has achieved maximal ROM and strength at this time. He will discharge from skilled services and continue with his home exercise program.       Anticipated barriers to physical therapy: unable to further progress with ROM.    Goals: Updated on 10/11/23  Short Term Goals: 2 week  Pt will increase left knee AROM flexion to 90 degrees, knee extension to -10 degrees, and quad lag to -10 degrees to allow patient normal gait pattern. Goal Met.  Pt will increase left knee strength to 3-/5 to improve functional mobility with all ADL's. Goal Met.    Long Term Goals: 4 weeks   1. Pt will increase left knee flexion to 100 degrees, knee extension to -5 degrees, and quad lag to -5 degrees to allow patient normal gait mechanics for community mobility.  2. Patient will increase left knee strength to 3+/5 to allow patient to safely return to prior level of function. Goal Met.    Plan     Discharge therapy services.    Adin Kaminski, PT    10/23/2023

## 2023-10-31 ENCOUNTER — EXTERNAL CHRONIC CARE MANAGEMENT (OUTPATIENT)
Dept: FAMILY MEDICINE | Facility: CLINIC | Age: 73
End: 2023-10-31
Payer: MEDICARE

## 2023-10-31 PROCEDURE — G0511 PR CHRONIC CARE MGMT, RHC OR FQHC ONLY, 20 MINS OR MORE: ICD-10-PCS | Mod: ,,, | Performed by: FAMILY MEDICINE

## 2023-10-31 PROCEDURE — G0511 CCM/BHI BY RHC/FQHC 20MIN MO: HCPCS | Mod: ,,, | Performed by: FAMILY MEDICINE

## 2023-11-02 DIAGNOSIS — G89.29 CHRONIC LOW BACK PAIN WITHOUT SCIATICA, UNSPECIFIED BACK PAIN LATERALITY: ICD-10-CM

## 2023-11-02 DIAGNOSIS — M54.50 CHRONIC LOW BACK PAIN WITHOUT SCIATICA, UNSPECIFIED BACK PAIN LATERALITY: ICD-10-CM

## 2023-11-02 NOTE — TELEPHONE ENCOUNTER
----- Message from Radha Eisenberg MA sent at 11/2/2023  3:34 PM CDT -----  Please call in gabapentin to [facility] for pt.

## 2023-11-03 RX ORDER — GABAPENTIN 300 MG/1
300 CAPSULE ORAL 3 TIMES DAILY
Qty: 270 CAPSULE | Refills: 1 | Status: SHIPPED | OUTPATIENT
Start: 2023-11-03

## 2023-11-27 DIAGNOSIS — Z79.4 TYPE 2 DIABETES MELLITUS WITH MICROALBUMINURIA, WITH LONG-TERM CURRENT USE OF INSULIN: ICD-10-CM

## 2023-11-27 DIAGNOSIS — E11.29 TYPE 2 DIABETES MELLITUS WITH MICROALBUMINURIA, WITH LONG-TERM CURRENT USE OF INSULIN: ICD-10-CM

## 2023-11-27 DIAGNOSIS — I10 HYPERTENSION, UNSPECIFIED TYPE: ICD-10-CM

## 2023-11-27 DIAGNOSIS — R80.9 TYPE 2 DIABETES MELLITUS WITH MICROALBUMINURIA, WITH LONG-TERM CURRENT USE OF INSULIN: ICD-10-CM

## 2023-11-27 DIAGNOSIS — F32.A DEPRESSION, UNSPECIFIED DEPRESSION TYPE: ICD-10-CM

## 2023-11-27 DIAGNOSIS — E78.5 HYPERLIPIDEMIA, UNSPECIFIED HYPERLIPIDEMIA TYPE: ICD-10-CM

## 2023-11-27 RX ORDER — CITALOPRAM 20 MG/1
20 TABLET, FILM COATED ORAL DAILY
Qty: 90 TABLET | Refills: 1 | Status: SHIPPED | OUTPATIENT
Start: 2023-11-27 | End: 2024-11-26

## 2023-11-27 RX ORDER — DAPAGLIFLOZIN 10 MG/1
10 TABLET, FILM COATED ORAL DAILY
Qty: 90 TABLET | Refills: 1 | Status: SHIPPED | OUTPATIENT
Start: 2023-11-27

## 2023-11-27 RX ORDER — ATORVASTATIN CALCIUM 10 MG/1
10 TABLET, FILM COATED ORAL DAILY
Qty: 90 TABLET | Refills: 1 | Status: SHIPPED | OUTPATIENT
Start: 2023-11-27

## 2023-11-27 RX ORDER — ENALAPRIL MALEATE 20 MG/1
20 TABLET ORAL DAILY
Qty: 90 TABLET | Refills: 1 | Status: SHIPPED | OUTPATIENT
Start: 2023-11-27

## 2023-11-27 RX ORDER — CARVEDILOL 3.12 MG/1
3.12 TABLET ORAL 2 TIMES DAILY
Qty: 180 TABLET | Refills: 1 | Status: SHIPPED | OUTPATIENT
Start: 2023-11-27

## 2023-11-30 ENCOUNTER — EXTERNAL CHRONIC CARE MANAGEMENT (OUTPATIENT)
Dept: FAMILY MEDICINE | Facility: CLINIC | Age: 73
End: 2023-11-30
Payer: MEDICARE

## 2023-11-30 PROCEDURE — G0511 CCM/BHI BY RHC/FQHC 20MIN MO: HCPCS | Mod: ,,, | Performed by: FAMILY MEDICINE

## 2023-11-30 PROCEDURE — G0511 PR CHRONIC CARE MGMT, RHC OR FQHC ONLY, 20 MINS OR MORE: ICD-10-PCS | Mod: ,,, | Performed by: FAMILY MEDICINE

## 2023-12-22 ENCOUNTER — OFFICE VISIT (OUTPATIENT)
Dept: FAMILY MEDICINE | Facility: CLINIC | Age: 73
End: 2023-12-22
Payer: MEDICARE

## 2023-12-22 VITALS
WEIGHT: 263 LBS | SYSTOLIC BLOOD PRESSURE: 120 MMHG | DIASTOLIC BLOOD PRESSURE: 60 MMHG | TEMPERATURE: 99 F | HEART RATE: 68 BPM | OXYGEN SATURATION: 100 % | HEIGHT: 70 IN | BODY MASS INDEX: 37.65 KG/M2 | RESPIRATION RATE: 19 BRPM

## 2023-12-22 DIAGNOSIS — Z11.52 ENCOUNTER FOR SCREENING FOR COVID-19: ICD-10-CM

## 2023-12-22 DIAGNOSIS — Z20.828 EXPOSURE TO VIRAL DISEASE: ICD-10-CM

## 2023-12-22 DIAGNOSIS — J06.9 UPPER RESPIRATORY TRACT INFECTION, UNSPECIFIED TYPE: Primary | ICD-10-CM

## 2023-12-22 LAB
CTP QC/QA: YES
FLUAV AG NPH QL: NEGATIVE
FLUBV AG NPH QL: NEGATIVE
RSV RAPID ANTIGEN: NEGATIVE
SARS-COV-2 RDRP RESP QL NAA+PROBE: NEGATIVE

## 2023-12-22 PROCEDURE — 87635 SARS-COV-2 COVID-19 AMP PRB: CPT | Mod: RHCUB | Performed by: NURSE PRACTITIONER

## 2023-12-22 PROCEDURE — 1159F MED LIST DOCD IN RCRD: CPT | Mod: ,,, | Performed by: NURSE PRACTITIONER

## 2023-12-22 PROCEDURE — 3051F PR MOST RECENT HEMOGLOBIN A1C LEVEL 7.0 - < 8.0%: ICD-10-PCS | Mod: ,,, | Performed by: NURSE PRACTITIONER

## 2023-12-22 PROCEDURE — 3078F PR MOST RECENT DIASTOLIC BLOOD PRESSURE < 80 MM HG: ICD-10-PCS | Mod: ,,, | Performed by: NURSE PRACTITIONER

## 2023-12-22 PROCEDURE — 3288F FALL RISK ASSESSMENT DOCD: CPT | Mod: ,,, | Performed by: NURSE PRACTITIONER

## 2023-12-22 PROCEDURE — 1101F PR PT FALLS ASSESS DOC 0-1 FALLS W/OUT INJ PAST YR: ICD-10-PCS | Mod: ,,, | Performed by: NURSE PRACTITIONER

## 2023-12-22 PROCEDURE — 99213 OFFICE O/P EST LOW 20 MIN: CPT | Mod: ,,, | Performed by: NURSE PRACTITIONER

## 2023-12-22 PROCEDURE — 1159F PR MEDICATION LIST DOCUMENTED IN MEDICAL RECORD: ICD-10-PCS | Mod: ,,, | Performed by: NURSE PRACTITIONER

## 2023-12-22 PROCEDURE — 4010F ACE/ARB THERAPY RXD/TAKEN: CPT | Mod: ,,, | Performed by: NURSE PRACTITIONER

## 2023-12-22 PROCEDURE — 1126F AMNT PAIN NOTED NONE PRSNT: CPT | Mod: ,,, | Performed by: NURSE PRACTITIONER

## 2023-12-22 PROCEDURE — 87807 RSV ASSAY W/OPTIC: CPT | Mod: RHCUB | Performed by: NURSE PRACTITIONER

## 2023-12-22 PROCEDURE — 1160F RVW MEDS BY RX/DR IN RCRD: CPT | Mod: ,,, | Performed by: NURSE PRACTITIONER

## 2023-12-22 PROCEDURE — 87804 INFLUENZA ASSAY W/OPTIC: CPT | Mod: 59,RHCUB | Performed by: NURSE PRACTITIONER

## 2023-12-22 PROCEDURE — 3078F DIAST BP <80 MM HG: CPT | Mod: ,,, | Performed by: NURSE PRACTITIONER

## 2023-12-22 PROCEDURE — 4010F PR ACE/ARB THEARPY RXD/TAKEN: ICD-10-PCS | Mod: ,,, | Performed by: NURSE PRACTITIONER

## 2023-12-22 PROCEDURE — 3074F PR MOST RECENT SYSTOLIC BLOOD PRESSURE < 130 MM HG: ICD-10-PCS | Mod: ,,, | Performed by: NURSE PRACTITIONER

## 2023-12-22 PROCEDURE — 1126F PR PAIN SEVERITY QUANTIFIED, NO PAIN PRESENT: ICD-10-PCS | Mod: ,,, | Performed by: NURSE PRACTITIONER

## 2023-12-22 PROCEDURE — 1101F PT FALLS ASSESS-DOCD LE1/YR: CPT | Mod: ,,, | Performed by: NURSE PRACTITIONER

## 2023-12-22 PROCEDURE — 1160F PR REVIEW ALL MEDS BY PRESCRIBER/CLIN PHARMACIST DOCUMENTED: ICD-10-PCS | Mod: ,,, | Performed by: NURSE PRACTITIONER

## 2023-12-22 PROCEDURE — 3074F SYST BP LT 130 MM HG: CPT | Mod: ,,, | Performed by: NURSE PRACTITIONER

## 2023-12-22 PROCEDURE — 3051F HG A1C>EQUAL 7.0%<8.0%: CPT | Mod: ,,, | Performed by: NURSE PRACTITIONER

## 2023-12-22 PROCEDURE — 99213 PR OFFICE/OUTPT VISIT, EST, LEVL III, 20-29 MIN: ICD-10-PCS | Mod: ,,, | Performed by: NURSE PRACTITIONER

## 2023-12-22 PROCEDURE — 3288F PR FALLS RISK ASSESSMENT DOCUMENTED: ICD-10-PCS | Mod: ,,, | Performed by: NURSE PRACTITIONER

## 2023-12-22 RX ORDER — CETIRIZINE HYDROCHLORIDE 10 MG/1
10 TABLET ORAL DAILY
Qty: 30 TABLET | Refills: 0 | Status: SHIPPED | OUTPATIENT
Start: 2023-12-22

## 2023-12-22 RX ORDER — BENZONATATE 100 MG/1
100 CAPSULE ORAL 3 TIMES DAILY PRN
Qty: 30 CAPSULE | Refills: 0 | Status: SHIPPED | OUTPATIENT
Start: 2023-12-22

## 2023-12-22 RX ORDER — IPRATROPIUM BROMIDE 21 UG/1
2 SPRAY, METERED NASAL 2 TIMES DAILY
Qty: 30 ML | Refills: 0 | Status: SHIPPED | OUTPATIENT
Start: 2023-12-22

## 2023-12-22 NOTE — PROGRESS NOTES
[facility]    Chief Complaint      Chief Complaint   Patient presents with    Cough     All sx onset of approximately 3 days    Nasal Congestion     Sinus pressure    Nausea       History of Present Illness      Davon Barrios is a 73 y.o. male. He  has a past medical history of Carcinoma of prostate, Hypertension, Sleep apnea, Type 2 diabetes mellitus, and Vitamin B 12 deficiency(non anemic)., who presents today for URI type symptoms- cough, congestion, and nausea x3 days.    Past Medical History:  Past Medical History:   Diagnosis Date    Carcinoma of prostate     Hypertension     Sleep apnea     Type 2 diabetes mellitus     Vitamin B 12 deficiency(non anemic)        Past Surgical History:   has a past surgical history that includes Nephrectomy (Right).    Social History:  Social History     Tobacco Use    Smoking status: Never     Passive exposure: Past    Smokeless tobacco: Never   Substance Use Topics    Alcohol use: Never    Drug use: Never       I personally reviewed all past medical, surgical, and social.     Review of Systems   Constitutional:  Negative for chills, fatigue and fever.   HENT:  Positive for congestion. Negative for sore throat.    Respiratory:  Positive for cough. Negative for shortness of breath.    Cardiovascular: Negative.    Gastrointestinal:  Positive for nausea. Negative for diarrhea and vomiting.   Musculoskeletal:  Negative for myalgias.   Skin:  Negative for rash.   Neurological:  Negative for headaches.        Medications:  Outpatient Encounter Medications as of 12/22/2023   Medication Sig Dispense Refill    ADULT LOW DOSE ASPIRIN ORAL Take 81 mg by mouth once daily.      ascorbic Acid (VITAMIN C) 500 mg CpSR Take 500 mg by mouth once daily.      atorvastatin (LIPITOR) 10 MG tablet Take 1 tablet (10 mg total) by mouth once daily. 90 tablet 1    baclofen (LIORESAL) 5 mg Tab tablet Take 1 tablet (5 mg total) by mouth every evening. 90 tablet 1    brimonidine 0.15 % OPTH  "DROP (ALPHAGAN) 0.15 % ophthalmic solution       carvediloL (COREG) 3.125 MG tablet Take 1 tablet (3.125 mg total) by mouth 2 (two) times daily. 180 tablet 1    citalopram (CELEXA) 20 MG tablet Take 1 tablet (20 mg total) by mouth once daily. 90 tablet 1    dapagliflozin propanediol (FARXIGA) 10 mg tablet Take 1 tablet (10 mg total) by mouth once daily. 90 tablet 1    dulaglutide (TRULICITY) 0.75 mg/0.5 mL pen injector       enalapril (VASOTEC) 20 MG tablet Take 1 tablet (20 mg total) by mouth once daily. as directed 90 tablet 1    gabapentin (NEURONTIN) 300 MG capsule Take 1 capsule (300 mg total) by mouth 3 (three) times daily. 270 capsule 1    insulin asp prt-insulin aspart, NovoLOG 70/30, (NOVOLOG MIX 70-30 U-100 INSULN) 100 unit/mL (70-30) Soln       insulin NPH-insulin regular, 70/30, (NOVOLIN 70/30) 100 unit/mL (70-30) injection Inject 42 units subcutaneously every morning and inject 30 units subcutaneously every evening      insulin syringe-needle U-100 (BD INSULIN SYRINGE) 1 mL 25 gauge x 5/8" Syrg BD Insulin Syringe 1 mL 25 gauge x 5/8"      latanoprost 0.005 % ophthalmic solution latanoprost 0.005 % eye drops      vitamin E 100 UNIT capsule Take 100 capsules by mouth once daily.      benzonatate (TESSALON) 100 MG capsule Take 1 capsule (100 mg total) by mouth 3 (three) times daily as needed for Cough. 30 capsule 0    cetirizine (ZYRTEC) 10 MG tablet Take 1 tablet (10 mg total) by mouth once daily. 30 tablet 0    ipratropium (ATROVENT) 21 mcg (0.03 %) nasal spray 2 sprays by Each Nostril route 2 (two) times daily. 30 mL 0     No facility-administered encounter medications on file as of 12/22/2023.       Allergies:  Review of patient's allergies indicates:  No Known Allergies    Health Maintenance:  Immunization History   Administered Date(s) Administered    COVID-19, MRNA, LN-S, PF (MODERNA FULL 0.5 ML DOSE) 03/05/2021, 04/06/2021, 11/19/2021    Influenza - High Dose - PF (65 years and older) 03/01/2021    " "Influenza - Quadrivalent - PF *Preferred* (6 months and older) 09/16/2021    Influenza Split 01/01/2015    Pneumococcal Conjugate - 13 Valent 03/01/2021    Pneumococcal Polysaccharide - 23 Valent 09/03/2014      Health Maintenance   Topic Date Due    Hepatitis C Screening  Never done    Shingles Vaccine (1 of 2) Never done    Eye Exam  08/09/2023    Foot Exam  08/16/2023    Hemoglobin A1c  01/18/2024    TETANUS VACCINE  02/21/2024 (Originally 8/8/1968)    Lipid Panel  10/18/2024    High Dose Statin  12/22/2024    Colorectal Cancer Screening  01/19/2027        Physical Exam      Vital Signs  Temp: 99 °F (37.2 °C)  Temp Source: Oral  Pulse: 68  Resp: 19  SpO2: 100 %  BP: 120/60  BP Location: Right arm  Patient Position: Sitting  Pain Score: 0-No pain  Height and Weight  Height: 5' 10" (177.8 cm)  Weight: 119.3 kg (263 lb)  BSA (Calculated - sq m): 2.43 sq meters  BMI (Calculated): 37.7  Weight in (lb) to have BMI = 25: 173.9]    Physical Exam  Vitals and nursing note reviewed.   Constitutional:       Appearance: Normal appearance.   HENT:      Head: Normocephalic.      Right Ear: Hearing, tympanic membrane, ear canal and external ear normal.      Left Ear: Hearing, ear canal and external ear normal. Tympanic membrane is scarred.      Ears:      Comments: Fluid noted behind level of L TM     Nose: Congestion present.      Right Turbinates: Swollen.      Left Turbinates: Swollen.      Mouth/Throat:      Lips: Pink.      Pharynx: Oropharynx is clear.      Comments: PND  Eyes:      General: Lids are normal.      Conjunctiva/sclera: Conjunctivae normal.   Neck:      Thyroid: No thyromegaly.   Cardiovascular:      Rate and Rhythm: Normal rate and regular rhythm.      Heart sounds: Normal heart sounds.   Pulmonary:      Effort: Pulmonary effort is normal.      Breath sounds: Normal breath sounds.   Musculoskeletal:         General: Normal range of motion.      Cervical back: Normal range of motion and neck supple.   Skin:    "  General: Skin is warm and dry.   Neurological:      General: No focal deficit present.      Mental Status: He is alert and oriented to person, place, and time.      Gait: Gait is intact.          Laboratory:  CBC:  Recent Labs   Lab 08/16/22 1638 02/21/23 1702 09/07/23  1152   WBC 8.37 8.47 7.62   RBC 4.57 L 4.34 L 4.42 L   Hemoglobin 13.9 13.0 L 13.3 L   Hematocrit 41.0 42.1 40.9   Platelet Count 217 212 218   MCV 89.7 97.0 H 92.5   MCH 30.4 30.0 30.1   MCHC 33.9 30.9 L 32.5     CMP:  Recent Labs   Lab 08/16/22  1638 02/21/23 1702 09/07/23  1152   Glucose 208 H 293 H 155 H   Calcium 9.5 9.1 9.3   Albumin 3.7 3.6 3.6   Total Protein 7.6 6.8 7.2   Sodium 138 137 140   Potassium 4.7 5.0 4.8   CO2 22 26 24   Chloride 108 H 106 106   BUN 24 H 32 H 40 H   Alk Phos 87 96 93   ALT 28 21 24   AST 23 16 17   Bilirubin, Total 0.3 0.3 0.4     LIPIDS:  Recent Labs   Lab 06/04/21  1121 09/07/21  1652 01/13/22  0947 08/16/22  1638 10/18/23  1002   TSH  --   --  1.360 1.190  --    HDL Cholesterol 44 40  --   --  45   Cholesterol 167 160  --   --  155   Triglycerides 137 175 H  --   --  107   LDL Calculated 96 85  --   --  89   Cholesterol/HDL Ratio (Risk Factor) 3.8 4.0  --   --  3.4   Non- 120  --   --  110     TSH:  Recent Labs   Lab 01/13/22  0947 08/16/22  1638   TSH 1.360 1.190     A1C:  Recent Labs   Lab 06/04/21  1121 09/07/21  1622 08/16/22  1638 02/21/23  1702 06/06/23  1044 10/18/23  1002   Hemoglobin A1C 9.0 H 9.3 H 11.1 H 12.0 H 7.6 H 7.8 H       Assessment/Plan     Davon Barrios is a 73 y.o.male with:     1. Upper respiratory tract infection, unspecified type  -     ipratropium (ATROVENT) 21 mcg (0.03 %) nasal spray; 2 sprays by Each Nostril route 2 (two) times daily.  Dispense: 30 mL; Refill: 0  -     cetirizine (ZYRTEC) 10 MG tablet; Take 1 tablet (10 mg total) by mouth once daily.  Dispense: 30 tablet; Refill: 0  -     benzonatate (TESSALON) 100 MG capsule; Take 1 capsule (100 mg total) by mouth 3  (three) times daily as needed for Cough.  Dispense: 30 capsule; Refill: 0    2. Exposure to viral disease  -     POCT Influenza A/B  -     POCT respiratory syncytial virus    3. Encounter for screening for COVID-19  -     POCT COVID-19 Rapid Screening       Rapid Covid/Flu/RSV- negative    Total time spent face-to-face and non-face-to-face coordinating care for this encounter was: 20 minutes     Chronic conditions status updated as per HPI.  Other than changes above, cont current medications and maintain follow up with specialists.  Return to clinic prn if symptoms worsen or fail to improve.    Suzanne Lezama, ROSALBAP  [facility]

## 2023-12-24 ENCOUNTER — HOSPITAL ENCOUNTER (EMERGENCY)
Facility: HOSPITAL | Age: 73
Discharge: HOME | End: 2023-12-24
Attending: EMERGENCY MEDICINE
Payer: MEDICARE

## 2023-12-24 VITALS
OXYGEN SATURATION: 96 % | TEMPERATURE: 98 F | HEIGHT: 72 IN | DIASTOLIC BLOOD PRESSURE: 42 MMHG | RESPIRATION RATE: 20 BRPM | HEART RATE: 78 BPM | BODY MASS INDEX: 35.49 KG/M2 | WEIGHT: 262 LBS | SYSTOLIC BLOOD PRESSURE: 153 MMHG

## 2023-12-24 DIAGNOSIS — H66.91 RIGHT OTITIS MEDIA, UNSPECIFIED OTITIS MEDIA TYPE: ICD-10-CM

## 2023-12-24 DIAGNOSIS — J02.9 PHARYNGITIS, UNSPECIFIED ETIOLOGY: ICD-10-CM

## 2023-12-24 DIAGNOSIS — J40 BRONCHITIS: ICD-10-CM

## 2023-12-24 DIAGNOSIS — R05.9 COUGH: ICD-10-CM

## 2023-12-24 DIAGNOSIS — J06.9 UPPER RESPIRATORY TRACT INFECTION, UNSPECIFIED TYPE: Primary | ICD-10-CM

## 2023-12-24 LAB
FLUAV AG UPPER RESP QL IA.RAPID: NEGATIVE
FLUBV AG UPPER RESP QL IA.RAPID: NEGATIVE
RAPID GROUP A STREP: NEGATIVE
SARS-COV+SARS-COV-2 AG RESP QL IA.RAPID: NEGATIVE

## 2023-12-24 PROCEDURE — 63600175 PHARM REV CODE 636 W HCPCS: Performed by: EMERGENCY MEDICINE

## 2023-12-24 PROCEDURE — 96372 THER/PROPH/DIAG INJ SC/IM: CPT | Performed by: EMERGENCY MEDICINE

## 2023-12-24 PROCEDURE — 87880 STREP A ASSAY W/OPTIC: CPT | Performed by: EMERGENCY MEDICINE

## 2023-12-24 PROCEDURE — 87428 SARSCOV & INF VIR A&B AG IA: CPT | Performed by: EMERGENCY MEDICINE

## 2023-12-24 PROCEDURE — 99284 EMERGENCY DEPT VISIT MOD MDM: CPT | Mod: ,,, | Performed by: EMERGENCY MEDICINE

## 2023-12-24 PROCEDURE — 99284 EMERGENCY DEPT VISIT MOD MDM: CPT | Mod: 25

## 2023-12-24 RX ORDER — CEFTRIAXONE 1 G/1
1 INJECTION, POWDER, FOR SOLUTION INTRAMUSCULAR; INTRAVENOUS ONCE
Status: COMPLETED | OUTPATIENT
Start: 2023-12-24 | End: 2023-12-24

## 2023-12-24 RX ORDER — LIDOCAINE HYDROCHLORIDE 10 MG/ML
1 INJECTION INFILTRATION; PERINEURAL ONCE
Status: DISCONTINUED | OUTPATIENT
Start: 2023-12-24 | End: 2023-12-24

## 2023-12-24 RX ORDER — METHYLPREDNISOLONE ACETATE 40 MG/ML
40 INJECTION, SUSPENSION INTRA-ARTICULAR; INTRALESIONAL; INTRAMUSCULAR; SOFT TISSUE
Status: COMPLETED | OUTPATIENT
Start: 2023-12-24 | End: 2023-12-24

## 2023-12-24 RX ORDER — AZITHROMYCIN 250 MG/1
250 TABLET, FILM COATED ORAL DAILY
Qty: 6 TABLET | Refills: 0 | Status: SHIPPED | OUTPATIENT
Start: 2023-12-24 | End: 2023-12-24

## 2023-12-24 RX ORDER — AZITHROMYCIN 250 MG/1
250 TABLET, FILM COATED ORAL DAILY
Qty: 6 TABLET | Refills: 0 | Status: SHIPPED | OUTPATIENT
Start: 2023-12-24 | End: 2024-02-15 | Stop reason: SDUPTHER

## 2023-12-24 RX ORDER — DEXAMETHASONE SODIUM PHOSPHATE 4 MG/ML
4 INJECTION, SOLUTION INTRA-ARTICULAR; INTRALESIONAL; INTRAMUSCULAR; INTRAVENOUS; SOFT TISSUE
Status: COMPLETED | OUTPATIENT
Start: 2023-12-24 | End: 2023-12-24

## 2023-12-24 RX ADMIN — CEFTRIAXONE SODIUM 1 G: 1 INJECTION, POWDER, FOR SOLUTION INTRAMUSCULAR; INTRAVENOUS at 11:12

## 2023-12-24 RX ADMIN — DEXAMETHASONE SODIUM PHOSPHATE 4 MG: 4 INJECTION, SOLUTION INTRAMUSCULAR; INTRAVENOUS at 10:12

## 2023-12-24 RX ADMIN — METHYLPREDNISOLONE ACETATE 40 MG: 40 INJECTION, SUSPENSION INTRA-ARTICULAR; INTRALESIONAL; INTRAMUSCULAR; SOFT TISSUE at 10:12

## 2023-12-24 NOTE — ED PROVIDER NOTES
Encounter Date: 12/24/2023       History     Chief Complaint   Patient presents with    Cough    Headache    Weakness    Sore Throat    Nasal Congestion     For 1 week saw his pcp friday     PT IS A 73 YR OLD  WITH CONGESTION, COUGH PRODUCTIVE, R EAR ACHE AND SORE THROAT ONSET 1 WEEK AGO. PT WAS EVALUATED PER NP MAGDA BECKHAM 2 D AGO WITH NEG COVID, FLU, STREP TESTING        Review of patient's allergies indicates:  No Known Allergies  Past Medical History:   Diagnosis Date    Carcinoma of prostate     Hypertension     Sleep apnea     Type 2 diabetes mellitus     Vitamin B 12 deficiency(non anemic)      Past Surgical History:   Procedure Laterality Date    knee scope Left     NEPHRECTOMY Right     PROSTATE SURGERY       Family History   Problem Relation Age of Onset    Diabetes Mother     Rectal cancer Father     Diabetes Father     Hypertension Father     Lung cancer Father     Hypertension Sister     Hypertension Brother      Social History     Tobacco Use    Smoking status: Never     Passive exposure: Past    Smokeless tobacco: Never   Substance Use Topics    Alcohol use: Never    Drug use: Never     Review of Systems   Constitutional: Negative.    HENT:  Positive for congestion, sneezing and sore throat.    Eyes: Negative.    Respiratory:  Positive for cough.    Cardiovascular: Negative.    Endocrine: Negative.    Genitourinary: Negative.    Musculoskeletal: Negative.    Skin: Negative.    Allergic/Immunologic: Negative.    Neurological: Negative.    Hematological: Negative.    Psychiatric/Behavioral: Negative.     All other systems reviewed and are negative.      Physical Exam     Initial Vitals [12/24/23 0936]   BP Pulse Resp Temp SpO2   (!) 153/42 78 20 98 °F (36.7 °C) 96 %      MAP       --         Physical Exam    Nursing note and vitals reviewed.  Constitutional: He appears well-developed and well-nourished. He is cooperative.   HENT:   Head: Normocephalic and atraumatic.   Right Ear: External ear normal.    Left Ear: External ear normal.   Nose: Nose normal.   PHARYNX ERYTHEMA NOTED   Eyes: Conjunctivae and EOM are normal. Pupils are equal, round, and reactive to light.   Neck: Trachea normal. Neck supple.   Normal range of motion.  Cardiovascular:  Normal rate, regular rhythm, normal heart sounds and intact distal pulses.           Pulses:       Radial pulses are 3+ on the right side and 3+ on the left side.        Dorsalis pedis pulses are 3+ on the right side and 3+ on the left side.   Pulmonary/Chest: Breath sounds normal.   Abdominal: Abdomen is soft. Bowel sounds are normal. He exhibits no distension.   Musculoskeletal:         General: Normal range of motion.      Right shoulder: Normal.      Left shoulder: Normal.      Right upper arm: Normal.      Left upper arm: Normal.      Right elbow: Normal.      Left elbow: Normal.      Right forearm: Normal.      Left forearm: Normal.      Right wrist: Normal.      Left wrist: Normal.      Right hand: Normal.      Left hand: Normal.      Cervical back: Normal range of motion and neck supple.     Lymphadenopathy:     He has cervical adenopathy.     He has no axillary adenopathy.   Neurological: He is alert and oriented to person, place, and time. He has normal strength. No cranial nerve deficit or sensory deficit. He displays a negative Romberg sign. GCS eye subscore is 4. GCS verbal subscore is 5. GCS motor subscore is 6.   Reflex Scores:       Bicep reflexes are 2+ on the right side and 2+ on the left side.       Patellar reflexes are 2+ on the right side and 2+ on the left side.  Skin: Skin is warm and dry. Capillary refill takes less than 2 seconds.   Psychiatric: He has a normal mood and affect. His speech is normal and behavior is normal. Judgment and thought content normal. Cognition and memory are normal.         Medical Screening Exam   See Full Note    ED Course   Procedures  Labs Reviewed   THROAT SCREEN, RAPID STREP - Normal   SARS-COV2 (COVID) W/ FLU  ANTIGEN - Normal    Narrative:     Negative SARS-CoV results should not be used as the sole basis for treatment or patient management decisions; negative results should be considered in the context of a patient's recent exposures, history and the presene of clinical signs and symptoms consistent with COVID-19.  Negative results should be treated as presumptive and confirmed by molecular assay, if necessary for patient management.          Imaging Results              X-Ray Chest AP Portable (Final result)  Result time 12/24/23 10:56:07   Procedure changed from X-Ray Chest PA And Lateral     Final result by Erick Chirinos MD (12/24/23 10:56:07)                   Impression:      No acute cardiopulmonary process      Electronically signed by: Erick Chirinos  Date:    12/24/2023  Time:    10:56               Narrative:    EXAMINATION:  XR CHEST AP PORTABLE    CLINICAL HISTORY:  cough;.  Cough, unspecified    COMPARISON:  September 7, 2023    TECHNIQUE:  Chest x-ray AP portable    FINDINGS:  The cardiomediastinal silhouette and pulmonary vasculature are unremarkable.    There is no acute infiltrate.  There is no gross pleural effusion.    There is a calcified granuloma lateral left mid to lower lung.    Osseous structures are unchanged                                       Medications   cefTRIAXone injection 1 g (1 g Intramuscular Given 12/24/23 1106)   dexAMETHasone injection 4 mg (4 mg Intramuscular Given 12/24/23 1057)   methylPREDNISolone acetate injection 40 mg (40 mg Intramuscular Given 12/24/23 1057)     Medical Decision Making  PT IS A 73 YR OLD  WITH CONGESTION, COUGH PRODUCTIVE, R EAR ACHE AND SORE THROAT ONSET 1 WEEK AGO. PT WAS EVALUATED PER NP MAGDA BECKHAM 2 D AGO WITH NEG COVID, FLU, STREP TESTING    Amount and/or Complexity of Data Reviewed  Labs: ordered.     Details: Viewed and Other Results - Labs      Updated   Order   12/24/23 1019  SARS-CoV2 (COVID) with FLU Antigen  Collected: 12/24/23  0953  Final result  Specimen: Respiratory from Nasopharyngeal      Influenza A Negative  Influenza B Negative  COVID-19 Ag Negative       12/24/23 1013  Rapid strep screen  Collected: 12/24/23 0953  Final result  Specimen: Throat      Rapid Group A Strep Negative            Radiology: ordered and independent interpretation performed.     Details: 12/24/23 1058  X-Ray Chest AP Portable  Performed: 12/24/23 1039  Final         Impression: No acute cardiopulmonary process Electronically signed by: Erick Chirinos Date: 12/24/2023 Time: 10:56        Discussion of management or test interpretation with external provider(s): EXAM  LABS NEG  CXR NEG  DC HOME IN STABLE CONDITION    Risk  Prescription drug management.                                      Clinical Impression:   Final diagnoses:  [R05.9] Cough  [J06.9] Upper respiratory tract infection, unspecified type (Primary)  [J40] Bronchitis  [J02.9] Pharyngitis, unspecified etiology  [H66.91] Right otitis media, unspecified otitis media type        ED Disposition Condition    Discharge Stable          ED Prescriptions       Medication Sig Dispense Start Date End Date Auth. Provider    azithromycin (Z-ANGIE) 250 MG tablet  (Status: Discontinued) Take 1 tablet (250 mg total) by mouth once daily. Take first 2 tablets together, then 1 every day until finished. 6 tablet 12/24/2023 12/24/2023 Sharri Manning MD    azithromycin (Z-ANGIE) 250 MG tablet Take 1 tablet (250 mg total) by mouth once daily. Take first 2 tablets together, then 1 every day until finished. 6 tablet 12/24/2023 -- Sharri Manning MD          Follow-up Information       Follow up With Specialties Details Why Contact Info    Oc Song MD Family Medicine In 1 week  [address].  [facility] [id number] [phone]               Sharri Manning MD  01/23/24 1311

## 2023-12-24 NOTE — DISCHARGE INSTRUCTIONS
INCREASE REST AND FLUIDS  MEDICATIONS AS DIRECTED: Z ANGIE  OVER THE COUNTER BENADRYL FOR CONGESTION, ROBITUSSIN FOR COUGH, TYLENOL FOR FEVER/PAIN, VITAMIN C,D,ZINC,   CHECK OXYGEN LEVEL WITH SAT MONITOR (CAN BUY AT DRUG STORE)  USE INCENTIVE SPIROMETER (CAN BUY AT DRUG STORE)  BOOST/ENSURE  MONITOR BLOOD SUGAR THIS WEEK 4 TIMES A DAY DUE TO STEROID

## 2023-12-31 ENCOUNTER — EXTERNAL CHRONIC CARE MANAGEMENT (OUTPATIENT)
Dept: FAMILY MEDICINE | Facility: CLINIC | Age: 73
End: 2023-12-31
Payer: MEDICARE

## 2023-12-31 PROCEDURE — G0511 CCM/BHI BY RHC/FQHC 20MIN MO: HCPCS | Mod: ,,, | Performed by: FAMILY MEDICINE

## 2024-01-05 DIAGNOSIS — F51.5 NIGHTMARES: ICD-10-CM

## 2024-01-05 RX ORDER — BACLOFEN 5 MG/1
5 TABLET ORAL NIGHTLY
Qty: 90 TABLET | Refills: 1 | Status: SHIPPED | OUTPATIENT
Start: 2024-01-05

## 2024-01-31 ENCOUNTER — EXTERNAL CHRONIC CARE MANAGEMENT (OUTPATIENT)
Dept: FAMILY MEDICINE | Facility: CLINIC | Age: 74
End: 2024-01-31
Payer: MEDICARE

## 2024-01-31 PROCEDURE — G0511 CCM/BHI BY RHC/FQHC 20MIN MO: HCPCS | Mod: ,,, | Performed by: FAMILY MEDICINE

## 2024-02-15 ENCOUNTER — OFFICE VISIT (OUTPATIENT)
Dept: FAMILY MEDICINE | Facility: CLINIC | Age: 74
End: 2024-02-15
Payer: MEDICARE

## 2024-02-15 VITALS
BODY MASS INDEX: 35.49 KG/M2 | TEMPERATURE: 100 F | SYSTOLIC BLOOD PRESSURE: 120 MMHG | HEART RATE: 71 BPM | WEIGHT: 262 LBS | HEIGHT: 72 IN | OXYGEN SATURATION: 95 % | DIASTOLIC BLOOD PRESSURE: 70 MMHG

## 2024-02-15 DIAGNOSIS — R05.1 ACUTE COUGH: Primary | ICD-10-CM

## 2024-02-15 DIAGNOSIS — U07.1 COVID: ICD-10-CM

## 2024-02-15 DIAGNOSIS — R50.9 FEVER, UNSPECIFIED FEVER CAUSE: ICD-10-CM

## 2024-02-15 LAB
CTP QC/QA: YES
FLUAV AG NPH QL: NEGATIVE
FLUBV AG NPH QL: NEGATIVE
POC RSV RAPID ANT MOLECULAR: NEGATIVE
S PYO RRNA THROAT QL PROBE: NEGATIVE
SARS-COV-2 RDRP RESP QL NAA+PROBE: POSITIVE

## 2024-02-15 PROCEDURE — 1101F PT FALLS ASSESS-DOCD LE1/YR: CPT | Mod: ,,, | Performed by: FAMILY MEDICINE

## 2024-02-15 PROCEDURE — 87635 SARS-COV-2 COVID-19 AMP PRB: CPT | Mod: RHCUB | Performed by: FAMILY MEDICINE

## 2024-02-15 PROCEDURE — 1160F RVW MEDS BY RX/DR IN RCRD: CPT | Mod: ,,, | Performed by: FAMILY MEDICINE

## 2024-02-15 PROCEDURE — 87804 INFLUENZA ASSAY W/OPTIC: CPT | Mod: 59,QW,RHCUB | Performed by: FAMILY MEDICINE

## 2024-02-15 PROCEDURE — 99214 OFFICE O/P EST MOD 30 MIN: CPT | Mod: ,,, | Performed by: FAMILY MEDICINE

## 2024-02-15 PROCEDURE — 1126F AMNT PAIN NOTED NONE PRSNT: CPT | Mod: ,,, | Performed by: FAMILY MEDICINE

## 2024-02-15 PROCEDURE — 3288F FALL RISK ASSESSMENT DOCD: CPT | Mod: ,,, | Performed by: FAMILY MEDICINE

## 2024-02-15 PROCEDURE — 3078F DIAST BP <80 MM HG: CPT | Mod: ,,, | Performed by: FAMILY MEDICINE

## 2024-02-15 PROCEDURE — 1159F MED LIST DOCD IN RCRD: CPT | Mod: ,,, | Performed by: FAMILY MEDICINE

## 2024-02-15 PROCEDURE — 87634 RSV DNA/RNA AMP PROBE: CPT | Mod: RHCUB | Performed by: FAMILY MEDICINE

## 2024-02-15 PROCEDURE — 3074F SYST BP LT 130 MM HG: CPT | Mod: ,,, | Performed by: FAMILY MEDICINE

## 2024-02-15 PROCEDURE — 87880 STREP A ASSAY W/OPTIC: CPT | Mod: RHCUB | Performed by: FAMILY MEDICINE

## 2024-02-15 RX ORDER — AZITHROMYCIN 250 MG/1
250 TABLET, FILM COATED ORAL DAILY
Qty: 6 TABLET | Refills: 0 | Status: SHIPPED | OUTPATIENT
Start: 2024-02-15

## 2024-02-15 RX ORDER — PROMETHAZINE HYDROCHLORIDE AND DEXTROMETHORPHAN HYDROBROMIDE 6.25; 15 MG/5ML; MG/5ML
5 SYRUP ORAL NIGHTLY PRN
Qty: 240 ML | Refills: 1 | Status: SHIPPED | OUTPATIENT
Start: 2024-02-15 | End: 2024-02-25

## 2024-02-15 RX ORDER — BICALUTAMIDE 50 MG/1
50 TABLET, FILM COATED ORAL DAILY
COMMUNITY
Start: 2024-01-31

## 2024-02-15 RX ORDER — FLUTICASONE PROPIONATE 50 MCG
2 SPRAY, SUSPENSION (ML) NASAL DAILY
Qty: 16 G | Refills: 2 | Status: SHIPPED | OUTPATIENT
Start: 2024-02-15

## 2024-02-15 NOTE — PROGRESS NOTES
Davon Barrios is a 73 y.o. male seen today for  a 2 day history of nasal congestion postnasal drainage low-grade fever and malaise with dry cough.  Patient's flu testing was negative but unfortunately he was positive for COVID.  Patient does have diminished renal function and is not a candidate for Paxlovid currently.  We discussed covering the patient with a Z-Alexey using Mucinex as well as Flonase daily.     Past Medical History:   Diagnosis Date    Carcinoma of prostate     Hypertension     Sleep apnea     Type 2 diabetes mellitus     Vitamin B 12 deficiency(non anemic)      Family History   Problem Relation Age of Onset    Diabetes Mother     Rectal cancer Father     Diabetes Father     Hypertension Father     Lung cancer Father     Hypertension Sister     Hypertension Brother      Current Outpatient Medications on File Prior to Visit   Medication Sig Dispense Refill    ADULT LOW DOSE ASPIRIN ORAL Take 81 mg by mouth once daily.      ascorbic Acid (VITAMIN C) 500 mg CpSR Take 500 mg by mouth once daily.      atorvastatin (LIPITOR) 10 MG tablet Take 1 tablet (10 mg total) by mouth once daily. 90 tablet 1    baclofen (LIORESAL) 5 mg Tab tablet TAKE 1 TABLET (5 MG TOTAL) BY MOUTH EVERY EVENING. 90 tablet 1    benzonatate (TESSALON) 100 MG capsule Take 1 capsule (100 mg total) by mouth 3 (three) times daily as needed for Cough. 30 capsule 0    bicalutamide (CASODEX) 50 MG Tab Take 50 mg by mouth once daily.      carvediloL (COREG) 3.125 MG tablet Take 1 tablet (3.125 mg total) by mouth 2 (two) times daily. 180 tablet 1    cetirizine (ZYRTEC) 10 MG tablet Take 1 tablet (10 mg total) by mouth once daily. 30 tablet 0    citalopram (CELEXA) 20 MG tablet Take 1 tablet (20 mg total) by mouth once daily. 90 tablet 1    dapagliflozin propanediol (FARXIGA) 10 mg tablet Take 1 tablet (10 mg total) by mouth once daily. 90 tablet 1    dulaglutide (TRULICITY) 0.75 mg/0.5 mL pen injector       enalapril (VASOTEC) 20 MG tablet  "Take 1 tablet (20 mg total) by mouth once daily. as directed 90 tablet 1    insulin asp prt-insulin aspart, NovoLOG 70/30, (NOVOLOG MIX 70-30 U-100 INSULN) 100 unit/mL (70-30) Soln 40 units am and 30 units pm      insulin NPH-insulin regular, 70/30, (NOVOLIN 70/30) 100 unit/mL (70-30) injection Inject 42 units subcutaneously every morning and inject 30 units subcutaneously every evening      insulin syringe-needle U-100 (BD INSULIN SYRINGE) 1 mL 25 gauge x 5/8" Syrg BD Insulin Syringe 1 mL 25 gauge x 5/8"      brimonidine 0.15 % OPTH DROP (ALPHAGAN) 0.15 % ophthalmic solution       gabapentin (NEURONTIN) 300 MG capsule Take 1 capsule (300 mg total) by mouth 3 (three) times daily. (Patient not taking: Reported on 12/24/2023) 270 capsule 1    ipratropium (ATROVENT) 21 mcg (0.03 %) nasal spray 2 sprays by Each Nostril route 2 (two) times daily. 30 mL 0    latanoprost 0.005 % ophthalmic solution latanoprost 0.005 % eye drops      vitamin E 100 UNIT capsule Take 100 capsules by mouth once daily.      [DISCONTINUED] azithromycin (Z-ANGIE) 250 MG tablet Take 1 tablet (250 mg total) by mouth once daily. Take first 2 tablets together, then 1 every day until finished. 6 tablet 0     No current facility-administered medications on file prior to visit.     Immunization History   Administered Date(s) Administered    COVID-19, MRNA, LN-S, PF (MODERNA FULL 0.5 ML DOSE) 03/05/2021, 04/06/2021, 11/19/2021    Influenza - High Dose - PF (65 years and older) 03/01/2021    Influenza - Quadrivalent - PF *Preferred* (6 months and older) 09/16/2021    Influenza Split 01/01/2015    Pneumococcal Conjugate - 13 Valent 03/01/2021    Pneumococcal Polysaccharide - 23 Valent 09/03/2014       Review of Systems   Constitutional:  Positive for fever and malaise/fatigue. Negative for weight loss.   HENT:  Positive for congestion.    Respiratory:  Positive for cough. Negative for shortness of breath.    Cardiovascular:  Negative for chest pain and " palpitations.   Gastrointestinal:  Negative for nausea and vomiting.   Psychiatric/Behavioral:  Negative for depression.         Vitals:    02/15/24 1117   BP: 120/70   Pulse: 71   Temp: 100.1 °F (37.8 °C)       Physical Exam  Vitals reviewed.   Constitutional:       Appearance: He is ill-appearing.   Eyes:      Conjunctiva/sclera: Conjunctivae normal.   Cardiovascular:      Rate and Rhythm: Normal rate and regular rhythm.   Pulmonary:      Effort: Pulmonary effort is normal. No respiratory distress.      Breath sounds: Normal breath sounds. No wheezing or rhonchi.   Neurological:      General: No focal deficit present.      Mental Status: He is alert.          Assessment and Plan  1. Acute cough  -     POCT rapid strep A  -     POCT Influenza A/B  -     POCT COVID-19 Rapid Screening  -     POCT respiratory syncytial virus    2. Fever, unspecified fever cause  -     POCT rapid strep A  -     POCT Influenza A/B  -     POCT COVID-19 Rapid Screening  -     POCT respiratory syncytial virus    3. COVID  -     azithromycin (Z-ANGIE) 250 MG tablet; Take 1 tablet (250 mg total) by mouth once daily. Take first 2 tablets together, then 1 every day until finished.  Dispense: 6 tablet; Refill: 0  -     promethazine-dextromethorphan (PROMETHAZINE-DM) 6.25-15 mg/5 mL Syrp; Take 5 mLs by mouth nightly as needed (cough).  Dispense: 240 mL; Refill: 1  -     fluticasone propionate (FLONASE) 50 mcg/actuation nasal spray; 2 sprays (100 mcg total) by Each Nostril route once daily.  Dispense: 16 g; Refill: 2             Return to clinic as needed or to the ER if symptoms worsen or persist or become severe such as severe shortness a breath or high fever.    Health Maintenance Topics with due status: Not Due       Topic Last Completion Date    Colorectal Cancer Screening 01/19/2022    Lipid Panel 10/18/2023

## 2024-02-29 ENCOUNTER — EXTERNAL CHRONIC CARE MANAGEMENT (OUTPATIENT)
Dept: FAMILY MEDICINE | Facility: CLINIC | Age: 74
End: 2024-02-29
Payer: MEDICARE

## 2024-02-29 PROCEDURE — G0511 CCM/BHI BY RHC/FQHC 20MIN MO: HCPCS | Mod: ,,, | Performed by: FAMILY MEDICINE

## 2024-03-31 ENCOUNTER — EXTERNAL CHRONIC CARE MANAGEMENT (OUTPATIENT)
Dept: FAMILY MEDICINE | Facility: CLINIC | Age: 74
End: 2024-03-31
Payer: MEDICARE

## 2024-03-31 PROCEDURE — G0511 CCM/BHI BY RHC/FQHC 20MIN MO: HCPCS | Mod: ,,, | Performed by: FAMILY MEDICINE

## 2024-04-03 DIAGNOSIS — R80.9 TYPE 2 DIABETES MELLITUS WITH MICROALBUMINURIA, WITH LONG-TERM CURRENT USE OF INSULIN: Primary | ICD-10-CM

## 2024-04-03 DIAGNOSIS — E11.29 TYPE 2 DIABETES MELLITUS WITH MICROALBUMINURIA, WITH LONG-TERM CURRENT USE OF INSULIN: Primary | ICD-10-CM

## 2024-04-03 DIAGNOSIS — Z79.4 TYPE 2 DIABETES MELLITUS WITH MICROALBUMINURIA, WITH LONG-TERM CURRENT USE OF INSULIN: Primary | ICD-10-CM

## 2024-04-03 DIAGNOSIS — R80.9 TYPE 2 DIABETES MELLITUS WITH MICROALBUMINURIA, WITH LONG-TERM CURRENT USE OF INSULIN: ICD-10-CM

## 2024-04-03 DIAGNOSIS — Z79.4 TYPE 2 DIABETES MELLITUS WITH MICROALBUMINURIA, WITH LONG-TERM CURRENT USE OF INSULIN: ICD-10-CM

## 2024-04-03 DIAGNOSIS — E11.29 TYPE 2 DIABETES MELLITUS WITH MICROALBUMINURIA, WITH LONG-TERM CURRENT USE OF INSULIN: ICD-10-CM

## 2024-04-03 RX ORDER — FLASH GLUCOSE SENSOR
KIT MISCELLANEOUS
Qty: 1 KIT | Refills: 5 | Status: SHIPPED | OUTPATIENT
Start: 2024-04-03 | End: 2024-04-03 | Stop reason: SDUPTHER

## 2024-04-03 RX ORDER — FLASH GLUCOSE SENSOR
KIT MISCELLANEOUS
Qty: 1 KIT | Refills: 5 | Status: SHIPPED | OUTPATIENT
Start: 2024-04-03

## 2024-04-03 RX ORDER — FLASH GLUCOSE SENSOR
1 KIT MISCELLANEOUS
COMMUNITY
End: 2024-04-03 | Stop reason: SDUPTHER

## 2024-05-30 DIAGNOSIS — I10 HYPERTENSION, UNSPECIFIED TYPE: ICD-10-CM

## 2024-05-31 ENCOUNTER — EXTERNAL CHRONIC CARE MANAGEMENT (OUTPATIENT)
Dept: FAMILY MEDICINE | Facility: CLINIC | Age: 74
End: 2024-05-31
Payer: MEDICARE

## 2024-05-31 PROCEDURE — G0511 CCM/BHI BY RHC/FQHC 20MIN MO: HCPCS | Mod: ,,, | Performed by: FAMILY MEDICINE

## 2024-05-31 RX ORDER — CARVEDILOL 3.12 MG/1
3.12 TABLET ORAL 2 TIMES DAILY
Qty: 60 TABLET | Refills: 0 | Status: SHIPPED | OUTPATIENT
Start: 2024-05-31

## 2024-06-25 ENCOUNTER — TELEPHONE (OUTPATIENT)
Dept: FAMILY MEDICINE | Facility: CLINIC | Age: 74
End: 2024-06-25
Payer: MEDICARE

## 2024-06-25 ENCOUNTER — OFFICE VISIT (OUTPATIENT)
Dept: FAMILY MEDICINE | Facility: CLINIC | Age: 74
End: 2024-06-25
Payer: MEDICARE

## 2024-06-25 ENCOUNTER — APPOINTMENT (OUTPATIENT)
Dept: RADIOLOGY | Facility: CLINIC | Age: 74
End: 2024-06-25
Attending: FAMILY MEDICINE
Payer: MEDICARE

## 2024-06-25 VITALS
DIASTOLIC BLOOD PRESSURE: 60 MMHG | HEIGHT: 72 IN | HEART RATE: 57 BPM | OXYGEN SATURATION: 95 % | SYSTOLIC BLOOD PRESSURE: 116 MMHG | TEMPERATURE: 98 F | RESPIRATION RATE: 20 BRPM | WEIGHT: 262 LBS | BODY MASS INDEX: 35.49 KG/M2

## 2024-06-25 DIAGNOSIS — I10 HYPERTENSION, UNSPECIFIED TYPE: ICD-10-CM

## 2024-06-25 DIAGNOSIS — R80.9 TYPE 2 DIABETES MELLITUS WITH MICROALBUMINURIA, WITH LONG-TERM CURRENT USE OF INSULIN: ICD-10-CM

## 2024-06-25 DIAGNOSIS — E11.29 TYPE 2 DIABETES MELLITUS WITH MICROALBUMINURIA, WITH LONG-TERM CURRENT USE OF INSULIN: ICD-10-CM

## 2024-06-25 DIAGNOSIS — I77.9 AORTA DISORDER: Primary | ICD-10-CM

## 2024-06-25 DIAGNOSIS — G89.29 CHRONIC LOW BACK PAIN WITHOUT SCIATICA, UNSPECIFIED BACK PAIN LATERALITY: ICD-10-CM

## 2024-06-25 DIAGNOSIS — M06.9 RHEUMATOID ARTHRITIS, INVOLVING UNSPECIFIED SITE, UNSPECIFIED WHETHER RHEUMATOID FACTOR PRESENT: ICD-10-CM

## 2024-06-25 DIAGNOSIS — G89.29 CHRONIC BILATERAL LOW BACK PAIN WITHOUT SCIATICA: Primary | ICD-10-CM

## 2024-06-25 DIAGNOSIS — G30.9 ALZHEIMER'S DISEASE, UNSPECIFIED (CODE): ICD-10-CM

## 2024-06-25 DIAGNOSIS — N18.31 STAGE 3A CHRONIC KIDNEY DISEASE: ICD-10-CM

## 2024-06-25 DIAGNOSIS — F32.A DEPRESSION, UNSPECIFIED DEPRESSION TYPE: ICD-10-CM

## 2024-06-25 DIAGNOSIS — E66.01 MORBID (SEVERE) OBESITY DUE TO EXCESS CALORIES: ICD-10-CM

## 2024-06-25 DIAGNOSIS — M54.50 CHRONIC LOW BACK PAIN WITHOUT SCIATICA, UNSPECIFIED BACK PAIN LATERALITY: ICD-10-CM

## 2024-06-25 DIAGNOSIS — E78.5 HYPERLIPIDEMIA, UNSPECIFIED HYPERLIPIDEMIA TYPE: ICD-10-CM

## 2024-06-25 DIAGNOSIS — M54.50 CHRONIC BILATERAL LOW BACK PAIN WITHOUT SCIATICA: Primary | ICD-10-CM

## 2024-06-25 DIAGNOSIS — C64.9 MALIGNANT NEOPLASM OF KIDNEY EXCLUDING RENAL PELVIS, UNSPECIFIED LATERALITY: ICD-10-CM

## 2024-06-25 DIAGNOSIS — Z79.4 TYPE 2 DIABETES MELLITUS WITH MICROALBUMINURIA, WITH LONG-TERM CURRENT USE OF INSULIN: ICD-10-CM

## 2024-06-25 LAB
ALBUMIN SERPL BCP-MCNC: 3.5 G/DL (ref 3.5–5)
ALBUMIN/GLOB SERPL: 1.3 {RATIO}
ALP SERPL-CCNC: 63 U/L (ref 45–115)
ALT SERPL W P-5'-P-CCNC: 18 U/L (ref 16–61)
ANION GAP SERPL CALCULATED.3IONS-SCNC: 13 MMOL/L (ref 7–16)
AST SERPL W P-5'-P-CCNC: 15 U/L (ref 15–37)
BASOPHILS # BLD AUTO: 0.04 K/UL (ref 0–0.2)
BASOPHILS NFR BLD AUTO: 0.6 % (ref 0–1)
BILIRUB SERPL-MCNC: 0.3 MG/DL (ref ?–1.2)
BUN SERPL-MCNC: 24 MG/DL (ref 7–18)
BUN/CREAT SERPL: 13 (ref 6–20)
CALCIUM SERPL-MCNC: 9 MG/DL (ref 8.5–10.1)
CHLORIDE SERPL-SCNC: 111 MMOL/L (ref 98–107)
CO2 SERPL-SCNC: 22 MMOL/L (ref 21–32)
CREAT SERPL-MCNC: 1.81 MG/DL (ref 0.7–1.3)
DIFFERENTIAL METHOD BLD: ABNORMAL
EGFR (NO RACE VARIABLE) (RUSH/TITUS): 39 ML/MIN/1.73M2
EOSINOPHIL # BLD AUTO: 0.14 K/UL (ref 0–0.5)
EOSINOPHIL NFR BLD AUTO: 1.9 % (ref 1–4)
ERYTHROCYTE [DISTWIDTH] IN BLOOD BY AUTOMATED COUNT: 14 % (ref 11.5–14.5)
EST. AVERAGE GLUCOSE BLD GHB EST-MCNC: 174 MG/DL
GLOBULIN SER-MCNC: 2.8 G/DL (ref 2–4)
GLUCOSE SERPL-MCNC: 78 MG/DL (ref 74–106)
HBA1C MFR BLD HPLC: 7.7 % (ref 4.5–6.6)
HCT VFR BLD AUTO: 41.6 % (ref 40–54)
HGB BLD-MCNC: 13.5 G/DL (ref 13.5–18)
IMM GRANULOCYTES # BLD AUTO: 0.03 K/UL (ref 0–0.04)
IMM GRANULOCYTES NFR BLD: 0.4 % (ref 0–0.4)
LYMPHOCYTES # BLD AUTO: 2.25 K/UL (ref 1–4.8)
LYMPHOCYTES NFR BLD AUTO: 31 % (ref 27–41)
MCH RBC QN AUTO: 29.2 PG (ref 27–31)
MCHC RBC AUTO-ENTMCNC: 32.5 G/DL (ref 32–36)
MCV RBC AUTO: 90 FL (ref 80–96)
MONOCYTES # BLD AUTO: 0.7 K/UL (ref 0–0.8)
MONOCYTES NFR BLD AUTO: 9.7 % (ref 2–6)
MPC BLD CALC-MCNC: 11.6 FL (ref 9.4–12.4)
NEUTROPHILS # BLD AUTO: 4.09 K/UL (ref 1.8–7.7)
NEUTROPHILS NFR BLD AUTO: 56.4 % (ref 53–65)
NRBC # BLD AUTO: 0 X10E3/UL
NRBC, AUTO (.00): 0 %
PLATELET # BLD AUTO: 185 K/UL (ref 150–400)
POTASSIUM SERPL-SCNC: 3.9 MMOL/L (ref 3.5–5.1)
PROT SERPL-MCNC: 6.3 G/DL (ref 6.4–8.2)
RBC # BLD AUTO: 4.62 M/UL (ref 4.6–6.2)
SODIUM SERPL-SCNC: 142 MMOL/L (ref 136–145)
WBC # BLD AUTO: 7.25 K/UL (ref 4.5–11)

## 2024-06-25 PROCEDURE — 80053 COMPREHEN METABOLIC PANEL: CPT | Mod: ,,, | Performed by: CLINICAL MEDICAL LABORATORY

## 2024-06-25 PROCEDURE — 1125F AMNT PAIN NOTED PAIN PRSNT: CPT | Mod: ,,, | Performed by: FAMILY MEDICINE

## 2024-06-25 PROCEDURE — 83036 HEMOGLOBIN GLYCOSYLATED A1C: CPT | Mod: ,,, | Performed by: CLINICAL MEDICAL LABORATORY

## 2024-06-25 PROCEDURE — 3078F DIAST BP <80 MM HG: CPT | Mod: ,,, | Performed by: FAMILY MEDICINE

## 2024-06-25 PROCEDURE — 1159F MED LIST DOCD IN RCRD: CPT | Mod: ,,, | Performed by: FAMILY MEDICINE

## 2024-06-25 PROCEDURE — 3074F SYST BP LT 130 MM HG: CPT | Mod: ,,, | Performed by: FAMILY MEDICINE

## 2024-06-25 PROCEDURE — 72100 X-RAY EXAM L-S SPINE 2/3 VWS: CPT | Mod: 26,,, | Performed by: RADIOLOGY

## 2024-06-25 PROCEDURE — 3288F FALL RISK ASSESSMENT DOCD: CPT | Mod: ,,, | Performed by: FAMILY MEDICINE

## 2024-06-25 PROCEDURE — 85025 COMPLETE CBC W/AUTO DIFF WBC: CPT | Mod: ,,, | Performed by: CLINICAL MEDICAL LABORATORY

## 2024-06-25 PROCEDURE — 99214 OFFICE O/P EST MOD 30 MIN: CPT | Mod: ,,, | Performed by: FAMILY MEDICINE

## 2024-06-25 PROCEDURE — 72100 X-RAY EXAM L-S SPINE 2/3 VWS: CPT | Mod: TC,RHCUB | Performed by: FAMILY MEDICINE

## 2024-06-25 PROCEDURE — 1101F PT FALLS ASSESS-DOCD LE1/YR: CPT | Mod: ,,, | Performed by: FAMILY MEDICINE

## 2024-06-25 PROCEDURE — 1160F RVW MEDS BY RX/DR IN RCRD: CPT | Mod: ,,, | Performed by: FAMILY MEDICINE

## 2024-06-25 RX ORDER — ATORVASTATIN CALCIUM 10 MG/1
10 TABLET, FILM COATED ORAL DAILY
Qty: 90 TABLET | Refills: 1 | Status: SHIPPED | OUTPATIENT
Start: 2024-06-25

## 2024-06-25 RX ORDER — ENALAPRIL MALEATE 20 MG/1
20 TABLET ORAL DAILY
Qty: 90 TABLET | Refills: 1 | Status: SHIPPED | OUTPATIENT
Start: 2024-06-25

## 2024-06-25 RX ORDER — CARVEDILOL 3.12 MG/1
3.12 TABLET ORAL 2 TIMES DAILY
Qty: 180 TABLET | Refills: 1 | Status: SHIPPED | OUTPATIENT
Start: 2024-06-25

## 2024-06-25 RX ORDER — ERGOCALCIFEROL 1.25 MG/1
1 CAPSULE ORAL
COMMUNITY

## 2024-06-25 RX ORDER — CITALOPRAM 20 MG/1
20 TABLET, FILM COATED ORAL DAILY
Qty: 90 TABLET | Refills: 1 | Status: SHIPPED | OUTPATIENT
Start: 2024-06-25 | End: 2024-12-22

## 2024-06-25 RX ORDER — DAPAGLIFLOZIN 10 MG/1
10 TABLET, FILM COATED ORAL DAILY
Qty: 90 TABLET | Refills: 1 | Status: SHIPPED | OUTPATIENT
Start: 2024-06-25

## 2024-06-25 RX ORDER — GABAPENTIN 300 MG/1
300 CAPSULE ORAL 3 TIMES DAILY
Qty: 270 CAPSULE | Refills: 1 | Status: SHIPPED | OUTPATIENT
Start: 2024-06-25

## 2024-06-25 NOTE — TELEPHONE ENCOUNTER
----- Message from Oc Song MD sent at 6/25/2024  1:02 PM CDT -----  Patient has degenerative disc disease of lumbar spine as we discussed with no fractures.  He also has heavy calcifications in the aorta and might benefit her an aortic ultrasound.

## 2024-06-25 NOTE — PROGRESS NOTES
"Davon Barrios is a 73 y.o. male seen today for follow-up on his hypertension and type 2 diabetes he also complains of increased symptoms of low back pain and we discussed an x-rays today and a possible pain management evaluation.  He is up-to-date on his diabetic eye exam and follow-up with Cardiology as well as follow-up with Urology regarding his history of renal cell carcinoma.      Past Medical History:   Diagnosis Date    Carcinoma of prostate     Hypertension     Sleep apnea     Type 2 diabetes mellitus     Vitamin B 12 deficiency(non anemic)      Family History   Problem Relation Name Age of Onset    Diabetes Mother      Rectal cancer Father      Diabetes Father      Hypertension Father      Lung cancer Father      Hypertension Sister      Hypertension Brother       Current Outpatient Medications on File Prior to Visit   Medication Sig Dispense Refill    ADULT LOW DOSE ASPIRIN ORAL Take 81 mg by mouth once daily.      ascorbic Acid (VITAMIN C) 500 mg CpSR Take 500 mg by mouth once daily.      baclofen (LIORESAL) 5 mg Tab tablet TAKE 1 TABLET (5 MG TOTAL) BY MOUTH EVERY EVENING. 90 tablet 1    brimonidine 0.15 % OPTH DROP (ALPHAGAN) 0.15 % ophthalmic solution       flash glucose sensor (FREESTYLE BRISEIDA 2 SENSOR) Kit Place one sensor on back of arm every 14 days to monitor glucose levels. 1 kit 5    fluticasone propionate (FLONASE) 50 mcg/actuation nasal spray 2 sprays (100 mcg total) by Each Nostril route once daily. 16 g 2    insulin NPH-insulin regular, 70/30, (NOVOLIN 70/30) 100 unit/mL (70-30) injection Inject 42 units subcutaneously every morning and inject 30 units subcutaneously every evening      insulin syringe-needle U-100 (BD INSULIN SYRINGE) 1 mL 25 gauge x 5/8" Syrg BD Insulin Syringe 1 mL 25 gauge x 5/8"      latanoprost 0.005 % ophthalmic solution latanoprost 0.005 % eye drops      vitamin E 100 UNIT capsule Take 100 capsules by mouth once daily.      [DISCONTINUED] atorvastatin (LIPITOR) 10 " MG tablet Take 1 tablet (10 mg total) by mouth once daily. 90 tablet 1    [DISCONTINUED] carvediloL (COREG) 3.125 MG tablet TAKE 1 TABLET (3.125 MG TOTAL) BY MOUTH 2 (TWO) TIMES DAILY. 60 tablet 0    [DISCONTINUED] citalopram (CELEXA) 20 MG tablet Take 1 tablet (20 mg total) by mouth once daily. 90 tablet 1    [DISCONTINUED] dapagliflozin propanediol (FARXIGA) 10 mg tablet Take 1 tablet (10 mg total) by mouth once daily. 90 tablet 1    [DISCONTINUED] enalapril (VASOTEC) 20 MG tablet Take 1 tablet (20 mg total) by mouth once daily. as directed 90 tablet 1    [DISCONTINUED] gabapentin (NEURONTIN) 300 MG capsule Take 1 capsule (300 mg total) by mouth 3 (three) times daily. 270 capsule 1    azithromycin (Z-ANGIE) 250 MG tablet Take 1 tablet (250 mg total) by mouth once daily. Take first 2 tablets together, then 1 every day until finished. (Patient not taking: Reported on 6/25/2024) 6 tablet 0    benzonatate (TESSALON) 100 MG capsule Take 1 capsule (100 mg total) by mouth 3 (three) times daily as needed for Cough. (Patient not taking: Reported on 6/25/2024) 30 capsule 0    bicalutamide (CASODEX) 50 MG Tab Take 50 mg by mouth once daily. (Patient not taking: Reported on 6/25/2024.)      cetirizine (ZYRTEC) 10 MG tablet Take 1 tablet (10 mg total) by mouth once daily. (Patient not taking: Reported on 6/25/2024) 30 tablet 0    dulaglutide (TRULICITY) 0.75 mg/0.5 mL pen injector  (Patient not taking: Reported on 6/25/2024)      ergocalciferol (ERGOCALCIFEROL) 50,000 unit Cap 1 capsule.      insulin asp prt-insulin aspart, NovoLOG 70/30, (NOVOLOG MIX 70-30 U-100 INSULN) 100 unit/mL (70-30) Soln 40 units am and 30 units pm (Patient not taking: Reported on 6/25/2024)      ipratropium (ATROVENT) 21 mcg (0.03 %) nasal spray 2 sprays by Each Nostril route 2 (two) times daily. (Patient not taking: Reported on 6/25/2024) 30 mL 0     No current facility-administered medications on file prior to visit.     Immunization History    Administered Date(s) Administered    COVID-19, MRNA, LN-S, PF (MODERNA FULL 0.5 ML DOSE) 03/05/2021, 04/06/2021, 11/19/2021    Influenza - High Dose - PF (65 years and older) 03/01/2021    Influenza - Quadrivalent - PF *Preferred* (6 months and older) 09/16/2021    Influenza Split 01/01/2015    Pneumococcal Conjugate - 13 Valent 03/01/2021    Pneumococcal Polysaccharide - 23 Valent 09/03/2014       Review of Systems   Constitutional:  Negative for fever, malaise/fatigue and weight loss.   Respiratory:  Negative for shortness of breath.    Cardiovascular:  Negative for chest pain and palpitations.   Gastrointestinal:  Negative for nausea and vomiting.   Musculoskeletal:  Positive for back pain and myalgias.   Psychiatric/Behavioral:  Negative for depression.         Vitals:    06/25/24 1109   BP: 116/60   Pulse: (!) 57   Resp: 20   Temp: 97.8 °F (36.6 °C)       Physical Exam  Vitals reviewed.   Constitutional:       Appearance: Normal appearance.   HENT:      Head: Normocephalic.   Eyes:      Extraocular Movements: Extraocular movements intact.      Conjunctiva/sclera: Conjunctivae normal.      Pupils: Pupils are equal, round, and reactive to light.   Neck:      Thyroid: No thyroid mass or thyromegaly.   Cardiovascular:      Rate and Rhythm: Normal rate and regular rhythm.      Heart sounds: Normal heart sounds. No murmur heard.     No gallop.   Pulmonary:      Effort: Pulmonary effort is normal. No respiratory distress.      Breath sounds: Normal breath sounds. No wheezing or rales.   Musculoskeletal:      Lumbar back: Spasms and tenderness present. Decreased range of motion.        Back:    Skin:     General: Skin is warm and dry.      Coloration: Skin is not jaundiced or pale.   Neurological:      Mental Status: He is alert.   Psychiatric:         Mood and Affect: Mood normal.         Behavior: Behavior normal.         Thought Content: Thought content normal.         Judgment: Judgment normal.           Assessment and Plan  1. Hyperlipidemia, unspecified hyperlipidemia type  -     atorvastatin (LIPITOR) 10 MG tablet; Take 1 tablet (10 mg total) by mouth once daily.  Dispense: 90 tablet; Refill: 1    2. Type 2 diabetes mellitus with microalbuminuria, with long-term current use of insulin  -     dapagliflozin propanediol (FARXIGA) 10 mg tablet; Take 1 tablet (10 mg total) by mouth once daily.  Dispense: 90 tablet; Refill: 1  -     CBC Auto Differential; Future; Expected date: 06/25/2024  -     Comprehensive Metabolic Panel; Future; Expected date: 06/25/2024  -     Hemoglobin A1C; Future; Expected date: 06/25/2024    3. Depression, unspecified depression type  -     citalopram (CELEXA) 20 MG tablet; Take 1 tablet (20 mg total) by mouth once daily.  Dispense: 90 tablet; Refill: 1    4. Hypertension, unspecified type  -     enalapril (VASOTEC) 20 MG tablet; Take 1 tablet (20 mg total) by mouth once daily. as directed  Dispense: 90 tablet; Refill: 1  -     carvediloL (COREG) 3.125 MG tablet; Take 1 tablet (3.125 mg total) by mouth 2 (two) times daily.  Dispense: 180 tablet; Refill: 1    5. Chronic low back pain without sciatica, unspecified back pain laterality  -     gabapentin (NEURONTIN) 300 MG capsule; Take 1 capsule (300 mg total) by mouth 3 (three) times daily.  Dispense: 270 capsule; Refill: 1    6. Morbid (severe) obesity due to excess calories    7. Malignant neoplasm of kidney excluding renal pelvis, unspecified laterality    8. Rheumatoid arthritis, involving unspecified site, unspecified whether rheumatoid factor present  Assessment & Plan:  We will continue current plan and monitor for exacerbations      9. Alzheimer's disease, unspecified (CODE)  Assessment & Plan:  We will continue current plan and monitor for decline      10. Stage 3a chronic kidney disease  Assessment & Plan:  We will continue current plan and monitor renal function.                    X-rays lumbar spine seem to show degenerative  changes        Return to clinic in 3 months or as needed once his x-rays are in.    Health Maintenance Topics with due status: Not Due       Topic Last Completion Date    Influenza Vaccine 09/16/2021    Colorectal Cancer Screening 01/19/2022    Lipid Panel 10/18/2023    Low Dose Statin 02/15/2024

## 2024-06-26 ENCOUNTER — TELEPHONE (OUTPATIENT)
Dept: FAMILY MEDICINE | Facility: CLINIC | Age: 74
End: 2024-06-26
Payer: MEDICARE

## 2024-06-26 RX ORDER — MIRTAZAPINE 7.5 MG/1
7.5 TABLET, FILM COATED ORAL NIGHTLY
COMMUNITY
End: 2024-06-26 | Stop reason: SDUPTHER

## 2024-06-26 RX ORDER — MIRTAZAPINE 7.5 MG/1
7.5 TABLET, FILM COATED ORAL NIGHTLY
Qty: 30 TABLET | Refills: 3 | Status: SHIPPED | OUTPATIENT
Start: 2024-06-26

## 2024-06-26 NOTE — TELEPHONE ENCOUNTER
----- Message from Leticia Rosas sent at 6/26/2024 10:48 AM CDT -----  Regarding: Medication not sent for sleep.  Contact: Spouse  Pt needs: medication not sent for sleep that doc said he would send in for him.    Pharmacy:[facility] - [place] - [id number]    Phone #: [phone]

## 2024-06-26 NOTE — TELEPHONE ENCOUNTER
----- Message from Oc Song MD sent at 6/26/2024  7:48 AM CDT -----  Good A1c with stable if not mildly improved renal function.

## 2024-06-30 ENCOUNTER — EXTERNAL CHRONIC CARE MANAGEMENT (OUTPATIENT)
Dept: FAMILY MEDICINE | Facility: CLINIC | Age: 74
End: 2024-06-30
Payer: MEDICARE

## 2024-06-30 PROCEDURE — G0511 CCM/BHI BY RHC/FQHC 20MIN MO: HCPCS | Mod: ,,, | Performed by: FAMILY MEDICINE

## 2024-07-10 ENCOUNTER — HOSPITAL ENCOUNTER (OUTPATIENT)
Dept: RADIOLOGY | Facility: HOSPITAL | Age: 74
Discharge: HOME | End: 2024-07-10
Attending: FAMILY MEDICINE
Payer: MEDICARE

## 2024-07-10 ENCOUNTER — TELEPHONE (OUTPATIENT)
Dept: FAMILY MEDICINE | Facility: CLINIC | Age: 74
End: 2024-07-10
Payer: MEDICARE

## 2024-07-10 DIAGNOSIS — I77.9 AORTA DISORDER: ICD-10-CM

## 2024-07-10 PROCEDURE — 76775 US EXAM ABDO BACK WALL LIM: CPT | Mod: TC

## 2024-07-10 PROCEDURE — 76775 US EXAM ABDO BACK WALL LIM: CPT | Mod: 26,,, | Performed by: RADIOLOGY

## 2024-07-10 NOTE — TELEPHONE ENCOUNTER
Shireen Shaneka notified that US showed no sonographic evidence of aortic aneurysm and she voiced understanding.

## 2024-07-10 NOTE — TELEPHONE ENCOUNTER
----- Message from Oc Song MD sent at 7/10/2024 11:57 AM CDT -----  No sonographic evidence of an aortic aneurysm

## 2024-07-12 DIAGNOSIS — M54.50 CHRONIC LOW BACK PAIN WITHOUT SCIATICA, UNSPECIFIED BACK PAIN LATERALITY: Primary | ICD-10-CM

## 2024-07-12 DIAGNOSIS — G89.29 CHRONIC LOW BACK PAIN WITHOUT SCIATICA, UNSPECIFIED BACK PAIN LATERALITY: Primary | ICD-10-CM

## 2024-07-31 ENCOUNTER — EXTERNAL CHRONIC CARE MANAGEMENT (OUTPATIENT)
Dept: FAMILY MEDICINE | Facility: CLINIC | Age: 74
End: 2024-07-31
Payer: MEDICARE

## 2024-07-31 PROCEDURE — G0511 CCM/BHI BY RHC/FQHC 20MIN MO: HCPCS | Mod: ,,, | Performed by: FAMILY MEDICINE

## 2024-08-31 ENCOUNTER — EXTERNAL CHRONIC CARE MANAGEMENT (OUTPATIENT)
Dept: FAMILY MEDICINE | Facility: CLINIC | Age: 74
End: 2024-08-31
Payer: MEDICARE

## 2024-08-31 PROCEDURE — G0511 CCM/BHI BY RHC/FQHC 20MIN MO: HCPCS | Mod: ,,, | Performed by: FAMILY MEDICINE

## 2024-09-30 ENCOUNTER — EXTERNAL CHRONIC CARE MANAGEMENT (OUTPATIENT)
Dept: FAMILY MEDICINE | Facility: CLINIC | Age: 74
End: 2024-09-30
Payer: MEDICARE

## 2024-09-30 PROCEDURE — G0511 CCM/BHI BY RHC/FQHC 20MIN MO: HCPCS | Mod: ,,, | Performed by: FAMILY MEDICINE

## 2024-10-21 ENCOUNTER — OFFICE VISIT (OUTPATIENT)
Dept: FAMILY MEDICINE | Facility: CLINIC | Age: 74
End: 2024-10-21
Payer: MEDICARE

## 2024-10-21 VITALS
OXYGEN SATURATION: 96 % | HEART RATE: 64 BPM | RESPIRATION RATE: 19 BRPM | BODY MASS INDEX: 35.89 KG/M2 | TEMPERATURE: 98 F | WEIGHT: 265 LBS | SYSTOLIC BLOOD PRESSURE: 136 MMHG | HEIGHT: 72 IN | DIASTOLIC BLOOD PRESSURE: 86 MMHG

## 2024-10-21 DIAGNOSIS — R80.9 TYPE 2 DIABETES MELLITUS WITH MICROALBUMINURIA, WITH LONG-TERM CURRENT USE OF INSULIN: Primary | ICD-10-CM

## 2024-10-21 DIAGNOSIS — F32.A DEPRESSION, UNSPECIFIED DEPRESSION TYPE: ICD-10-CM

## 2024-10-21 DIAGNOSIS — Z79.4 TYPE 2 DIABETES MELLITUS WITH MICROALBUMINURIA, WITH LONG-TERM CURRENT USE OF INSULIN: Primary | ICD-10-CM

## 2024-10-21 DIAGNOSIS — M54.50 CHRONIC LOW BACK PAIN WITHOUT SCIATICA, UNSPECIFIED BACK PAIN LATERALITY: ICD-10-CM

## 2024-10-21 DIAGNOSIS — I10 HYPERTENSION, UNSPECIFIED TYPE: ICD-10-CM

## 2024-10-21 DIAGNOSIS — E78.5 HYPERLIPIDEMIA, UNSPECIFIED HYPERLIPIDEMIA TYPE: ICD-10-CM

## 2024-10-21 DIAGNOSIS — G89.29 CHRONIC LOW BACK PAIN WITHOUT SCIATICA, UNSPECIFIED BACK PAIN LATERALITY: ICD-10-CM

## 2024-10-21 DIAGNOSIS — Z23 NEED FOR IMMUNIZATION AGAINST INFLUENZA: ICD-10-CM

## 2024-10-21 DIAGNOSIS — E11.29 TYPE 2 DIABETES MELLITUS WITH MICROALBUMINURIA, WITH LONG-TERM CURRENT USE OF INSULIN: Primary | ICD-10-CM

## 2024-10-21 LAB
ALBUMIN SERPL BCP-MCNC: 3.8 G/DL (ref 3.5–5)
ALBUMIN/GLOB SERPL: 1.2 {RATIO}
ALP SERPL-CCNC: 77 U/L (ref 45–115)
ALT SERPL W P-5'-P-CCNC: 17 U/L (ref 16–61)
ANION GAP SERPL CALCULATED.3IONS-SCNC: 9 MMOL/L (ref 7–16)
AST SERPL W P-5'-P-CCNC: 16 U/L (ref 15–37)
BASOPHILS # BLD AUTO: 0.03 K/UL (ref 0–0.2)
BASOPHILS NFR BLD AUTO: 0.4 % (ref 0–1)
BILIRUB SERPL-MCNC: 0.5 MG/DL (ref ?–1.2)
BUN SERPL-MCNC: 26 MG/DL (ref 7–18)
BUN/CREAT SERPL: 14 (ref 6–20)
CALCIUM SERPL-MCNC: 9.7 MG/DL (ref 8.5–10.1)
CHLORIDE SERPL-SCNC: 106 MMOL/L (ref 98–107)
CHOLEST SERPL-MCNC: 160 MG/DL (ref 0–200)
CHOLEST/HDLC SERPL: 3.3 {RATIO}
CO2 SERPL-SCNC: 30 MMOL/L (ref 21–32)
CREAT SERPL-MCNC: 1.91 MG/DL (ref 0.7–1.3)
CREAT UR-MCNC: 133 MG/DL (ref 39–259)
DIFFERENTIAL METHOD BLD: ABNORMAL
EGFR (NO RACE VARIABLE) (RUSH/TITUS): 36 ML/MIN/1.73M2
EOSINOPHIL # BLD AUTO: 0.1 K/UL (ref 0–0.5)
EOSINOPHIL NFR BLD AUTO: 1.2 % (ref 1–4)
ERYTHROCYTE [DISTWIDTH] IN BLOOD BY AUTOMATED COUNT: 13.4 % (ref 11.5–14.5)
GLOBULIN SER-MCNC: 3.2 G/DL (ref 2–4)
GLUCOSE SERPL-MCNC: 53 MG/DL (ref 74–106)
HCT VFR BLD AUTO: 45.4 % (ref 40–54)
HDLC SERPL-MCNC: 48 MG/DL (ref 40–60)
HGB BLD-MCNC: 14.6 G/DL (ref 13.5–18)
IMM GRANULOCYTES # BLD AUTO: 0.03 K/UL (ref 0–0.04)
IMM GRANULOCYTES NFR BLD: 0.4 % (ref 0–0.4)
LDLC SERPL CALC-MCNC: 101 MG/DL
LDLC/HDLC SERPL: 2.1 {RATIO}
LYMPHOCYTES # BLD AUTO: 2.83 K/UL (ref 1–4.8)
LYMPHOCYTES NFR BLD AUTO: 33.9 % (ref 27–41)
MCH RBC QN AUTO: 30 PG (ref 27–31)
MCHC RBC AUTO-ENTMCNC: 32.2 G/DL (ref 32–36)
MCV RBC AUTO: 93.2 FL (ref 80–96)
MICROALBUMIN UR-MCNC: 48.6 MG/DL (ref 0–2.8)
MICROALBUMIN/CREAT RATIO PNL UR: 365.4 MG/G (ref 0–30)
MONOCYTES # BLD AUTO: 0.63 K/UL (ref 0–0.8)
MONOCYTES NFR BLD AUTO: 7.6 % (ref 2–6)
MPC BLD CALC-MCNC: 11.8 FL (ref 9.4–12.4)
NEUTROPHILS # BLD AUTO: 4.72 K/UL (ref 1.8–7.7)
NEUTROPHILS NFR BLD AUTO: 56.5 % (ref 53–65)
NONHDLC SERPL-MCNC: 112 MG/DL
NRBC # BLD AUTO: 0 X10E3/UL
NRBC, AUTO (.00): 0 %
PLATELET # BLD AUTO: 203 K/UL (ref 150–400)
POTASSIUM SERPL-SCNC: 4.2 MMOL/L (ref 3.5–5.1)
PROT SERPL-MCNC: 7 G/DL (ref 6.4–8.2)
RBC # BLD AUTO: 4.87 M/UL (ref 4.6–6.2)
SODIUM SERPL-SCNC: 141 MMOL/L (ref 136–145)
TRIGL SERPL-MCNC: 53 MG/DL (ref 35–150)
VLDLC SERPL-MCNC: 11 MG/DL
WBC # BLD AUTO: 8.34 K/UL (ref 4.5–11)

## 2024-10-21 PROCEDURE — 3288F FALL RISK ASSESSMENT DOCD: CPT | Mod: ,,, | Performed by: FAMILY MEDICINE

## 2024-10-21 PROCEDURE — 85025 COMPLETE CBC W/AUTO DIFF WBC: CPT | Mod: ,,, | Performed by: CLINICAL MEDICAL LABORATORY

## 2024-10-21 PROCEDURE — 3075F SYST BP GE 130 - 139MM HG: CPT | Mod: ,,, | Performed by: FAMILY MEDICINE

## 2024-10-21 PROCEDURE — 80061 LIPID PANEL: CPT | Mod: ,,, | Performed by: CLINICAL MEDICAL LABORATORY

## 2024-10-21 PROCEDURE — 1126F AMNT PAIN NOTED NONE PRSNT: CPT | Mod: ,,, | Performed by: FAMILY MEDICINE

## 2024-10-21 PROCEDURE — 82570 ASSAY OF URINE CREATININE: CPT | Mod: ,,, | Performed by: CLINICAL MEDICAL LABORATORY

## 2024-10-21 PROCEDURE — 1159F MED LIST DOCD IN RCRD: CPT | Mod: ,,, | Performed by: FAMILY MEDICINE

## 2024-10-21 PROCEDURE — 3079F DIAST BP 80-89 MM HG: CPT | Mod: ,,, | Performed by: FAMILY MEDICINE

## 2024-10-21 PROCEDURE — 90653 IIV ADJUVANT VACCINE IM: CPT | Mod: ,,, | Performed by: FAMILY MEDICINE

## 2024-10-21 PROCEDURE — G0008 ADMIN INFLUENZA VIRUS VAC: HCPCS | Mod: ,,, | Performed by: FAMILY MEDICINE

## 2024-10-21 PROCEDURE — 1101F PT FALLS ASSESS-DOCD LE1/YR: CPT | Mod: ,,, | Performed by: FAMILY MEDICINE

## 2024-10-21 PROCEDURE — 80053 COMPREHEN METABOLIC PANEL: CPT | Mod: ,,, | Performed by: CLINICAL MEDICAL LABORATORY

## 2024-10-21 PROCEDURE — 82043 UR ALBUMIN QUANTITATIVE: CPT | Mod: ,,, | Performed by: CLINICAL MEDICAL LABORATORY

## 2024-10-21 PROCEDURE — 1160F RVW MEDS BY RX/DR IN RCRD: CPT | Mod: ,,, | Performed by: FAMILY MEDICINE

## 2024-10-21 PROCEDURE — 99214 OFFICE O/P EST MOD 30 MIN: CPT | Mod: 25,,, | Performed by: FAMILY MEDICINE

## 2024-10-21 RX ORDER — CARVEDILOL 3.12 MG/1
3.12 TABLET ORAL 2 TIMES DAILY
Qty: 180 TABLET | Refills: 1 | Status: SHIPPED | OUTPATIENT
Start: 2024-10-21

## 2024-10-21 RX ORDER — FLASH GLUCOSE SCANNING READER
EACH MISCELLANEOUS
Qty: 1 EACH | Refills: 1 | Status: SHIPPED | OUTPATIENT
Start: 2024-10-21

## 2024-10-21 RX ORDER — GABAPENTIN 300 MG/1
300 CAPSULE ORAL 3 TIMES DAILY
Qty: 270 CAPSULE | Refills: 1 | Status: SHIPPED | OUTPATIENT
Start: 2024-10-21

## 2024-10-21 RX ORDER — CITALOPRAM 20 MG/1
20 TABLET, FILM COATED ORAL DAILY
Qty: 90 TABLET | Refills: 1 | Status: SHIPPED | OUTPATIENT
Start: 2024-10-21 | End: 2025-04-19

## 2024-10-21 RX ORDER — ATORVASTATIN CALCIUM 10 MG/1
10 TABLET, FILM COATED ORAL DAILY
Qty: 90 TABLET | Refills: 1 | Status: SHIPPED | OUTPATIENT
Start: 2024-10-21

## 2024-10-21 RX ORDER — FUROSEMIDE 40 MG/1
20 TABLET ORAL DAILY
COMMUNITY
Start: 2024-09-17

## 2024-10-21 RX ORDER — ENALAPRIL MALEATE 20 MG/1
20 TABLET ORAL DAILY
Qty: 90 TABLET | Refills: 1 | Status: SHIPPED | OUTPATIENT
Start: 2024-10-21

## 2024-10-21 RX ORDER — DAPAGLIFLOZIN 10 MG/1
10 TABLET, FILM COATED ORAL DAILY
Qty: 90 TABLET | Refills: 1 | Status: SHIPPED | OUTPATIENT
Start: 2024-10-21

## 2024-10-21 NOTE — Clinical Note
Discussed care gaps with pt. Pt reports has an eye exam tomorrow in [place]. Pt is agreeable to flu vaccine and diabetic urine screening. Pt defers other care gaps at this time

## 2024-10-21 NOTE — PROGRESS NOTES
"Davon Barrios is a 74 y.o. male seen today for follow-up on his type 2 diabetes and hypertension.  He denies any chest pain or shortness for breath and is active in meeting his ADLs.  The patient did have an A1c done through our nurse practitioner that his insurance provided via home visit and unfortunately this was elevated at 9.0.  Since the patient has improved his diet and is now sleeping better on a new CPAP mask.  I have asked the patient to return with his glucose log in 1 month.      Past Medical History:   Diagnosis Date    Carcinoma of prostate     Hypertension     Sleep apnea     Type 2 diabetes mellitus     Vitamin B 12 deficiency(non anemic)      Family History   Problem Relation Name Age of Onset    Diabetes Mother      Rectal cancer Father      Diabetes Father      Hypertension Father      Lung cancer Father      Hypertension Sister      Hypertension Brother       Current Outpatient Medications on File Prior to Visit   Medication Sig Dispense Refill    ADULT LOW DOSE ASPIRIN ORAL Take 81 mg by mouth once daily.      ascorbic Acid (VITAMIN C) 500 mg CpSR Take 500 mg by mouth once daily.      brimonidine 0.15 % OPTH DROP (ALPHAGAN) 0.15 % ophthalmic solution       ergocalciferol (ERGOCALCIFEROL) 50,000 unit Cap 1 capsule.      flash glucose sensor (FREESTYLE BRISEIDA 2 SENSOR) Kit Place one sensor on back of arm every 14 days to monitor glucose levels. 1 kit 5    fluticasone propionate (FLONASE) 50 mcg/actuation nasal spray 2 sprays (100 mcg total) by Each Nostril route once daily. 16 g 2    furosemide (LASIX) 40 MG tablet Take 20 mg by mouth once daily.      insulin NPH-insulin regular, 70/30, (NOVOLIN 70/30) 100 unit/mL (70-30) injection Inject 40 units subcutaneously every morning and inject 32 units subcutaneously every evening      insulin syringe-needle U-100 (BD INSULIN SYRINGE) 1 mL 25 gauge x 5/8" Syrg BD Insulin Syringe 1 mL 25 gauge x 5/8"      latanoprost 0.005 % ophthalmic solution " latanoprost 0.005 % eye drops      vitamin E 100 UNIT capsule Take 100 capsules by mouth once daily.      [DISCONTINUED] atorvastatin (LIPITOR) 10 MG tablet Take 1 tablet (10 mg total) by mouth once daily. 90 tablet 1    [DISCONTINUED] carvediloL (COREG) 3.125 MG tablet Take 1 tablet (3.125 mg total) by mouth 2 (two) times daily. 180 tablet 1    [DISCONTINUED] citalopram (CELEXA) 20 MG tablet Take 1 tablet (20 mg total) by mouth once daily. 90 tablet 1    [DISCONTINUED] dapagliflozin propanediol (FARXIGA) 10 mg tablet Take 1 tablet (10 mg total) by mouth once daily. 90 tablet 1    [DISCONTINUED] enalapril (VASOTEC) 20 MG tablet Take 1 tablet (20 mg total) by mouth once daily. as directed 90 tablet 1    [DISCONTINUED] gabapentin (NEURONTIN) 300 MG capsule Take 1 capsule (300 mg total) by mouth 3 (three) times daily. 270 capsule 1    mirtazapine (REMERON) 7.5 MG Tab Take 1 tablet (7.5 mg total) by mouth every evening. (Patient not taking: Reported on 10/21/2024) 30 tablet 3     No current facility-administered medications on file prior to visit.     Immunization History   Administered Date(s) Administered    COVID-19, MRNA, LN-S, PF (MODERNA FULL 0.5 ML DOSE) 03/05/2021, 04/06/2021, 11/19/2021    Influenza - Quadrivalent - PF *Preferred* (6 months and older) 09/16/2021    Influenza - Trivalent - Fluzone High Dose - PF (65 years and older) 03/01/2021    Influenza Split 01/01/2015    Pneumococcal Conjugate - 13 Valent 03/01/2021    Pneumococcal Polysaccharide - 23 Valent 09/03/2014       Review of Systems   Constitutional:  Negative for fever, malaise/fatigue and weight loss.   Respiratory:  Negative for shortness of breath.    Cardiovascular:  Negative for chest pain and palpitations.   Gastrointestinal:  Negative for nausea and vomiting.   Psychiatric/Behavioral:  Negative for depression.         Vitals:    10/21/24 0940   BP: 136/86   Pulse: 64   Resp: 19   Temp: 97.5 °F (36.4 °C)       Physical Exam  Vitals  reviewed.   Constitutional:       Appearance: Normal appearance.   HENT:      Head: Normocephalic.   Eyes:      Extraocular Movements: Extraocular movements intact.      Conjunctiva/sclera: Conjunctivae normal.      Pupils: Pupils are equal, round, and reactive to light.   Neck:      Thyroid: No thyroid mass or thyromegaly.   Cardiovascular:      Rate and Rhythm: Normal rate and regular rhythm.      Heart sounds: Normal heart sounds. No murmur heard.     No gallop.   Pulmonary:      Effort: Pulmonary effort is normal. No respiratory distress.      Breath sounds: Normal breath sounds. No wheezing or rales.   Skin:     General: Skin is warm and dry.      Coloration: Skin is not jaundiced or pale.   Neurological:      Mental Status: He is alert.   Psychiatric:         Mood and Affect: Mood normal.         Behavior: Behavior normal.         Thought Content: Thought content normal.         Judgment: Judgment normal.          Assessment and Plan  1. Type 2 diabetes mellitus with microalbuminuria, with long-term current use of insulin  -     Microalbumin/Creatinine Ratio, Urine  -     dapagliflozin propanediol (FARXIGA) 10 mg tablet; Take 1 tablet (10 mg total) by mouth once daily.  Dispense: 90 tablet; Refill: 1  -     flash glucose scanning reader ([employer] BRISEIDA 2 READER) [facility] [place]; Use to monitor glucose  Dispense: 1 each; Refill: 1    2. Need for immunization against influenza  -     influenza (adjuvanted) (Fluad) 45 mcg/0.5 mL IM vaccine (> or = 64 yo) 0.5 mL    3. Hyperlipidemia, unspecified hyperlipidemia type  -     Lipid Panel; Future; Expected date: 10/21/2024  -     atorvastatin (LIPITOR) 10 MG tablet; Take 1 tablet (10 mg total) by mouth once daily.  Dispense: 90 tablet; Refill: 1    4. Hypertension, unspecified type  -     CBC Auto Differential; Future; Expected date: 10/21/2024  -     Comprehensive Metabolic Panel; Future; Expected date: 10/21/2024  -     enalapril (VASOTEC) 20 MG tablet; Take 1 tablet (20 mg  total) by mouth once daily. as directed  Dispense: 90 tablet; Refill: 1  -     carvediloL (COREG) 3.125 MG tablet; Take 1 tablet (3.125 mg total) by mouth 2 (two) times daily.  Dispense: 180 tablet; Refill: 1    5. Depression, unspecified depression type  -     citalopram (CELEXA) 20 MG tablet; Take 1 tablet (20 mg total) by mouth once daily.  Dispense: 90 tablet; Refill: 1    6. Chronic low back pain without sciatica, unspecified back pain laterality  -     gabapentin (NEURONTIN) 300 MG capsule; Take 1 capsule (300 mg total) by mouth 3 (three) times daily.  Dispense: 270 capsule; Refill: 1             Return to clinic in 1 month to review blood sugar log or as needed.    Health Maintenance Topics with due status: Not Due       Topic Last Completion Date    Hemoglobin A1c 06/25/2024

## 2024-10-23 ENCOUNTER — TELEPHONE (OUTPATIENT)
Dept: FAMILY MEDICINE | Facility: CLINIC | Age: 74
End: 2024-10-23
Payer: MEDICARE

## 2024-10-23 NOTE — TELEPHONE ENCOUNTER
----- Message from cO Song MD sent at 10/22/2024  8:02 AM CDT -----  Office visit for microalbuminuria renal function and LDL

## 2024-10-24 ENCOUNTER — TELEPHONE (OUTPATIENT)
Dept: FAMILY MEDICINE | Facility: CLINIC | Age: 74
End: 2024-10-24
Payer: MEDICARE

## 2024-10-24 NOTE — TELEPHONE ENCOUNTER
Patient notified that Dr Martinez would like an Office visit for microalbuminuria renal function and LDL , patient was reminded of his Nov.20 appointment at 9:30, patient verbalized understanding and states he will be at his appointment to discuss these lab further with Dr martinez.

## 2024-10-31 ENCOUNTER — EXTERNAL CHRONIC CARE MANAGEMENT (OUTPATIENT)
Dept: FAMILY MEDICINE | Facility: CLINIC | Age: 74
End: 2024-10-31
Payer: MEDICARE

## 2024-10-31 PROCEDURE — G0511 CCM/BHI BY RHC/FQHC 20MIN MO: HCPCS | Mod: ,,, | Performed by: FAMILY MEDICINE

## 2024-11-20 ENCOUNTER — OFFICE VISIT (OUTPATIENT)
Dept: FAMILY MEDICINE | Facility: CLINIC | Age: 74
End: 2024-11-20
Payer: MEDICARE

## 2024-11-20 VITALS
HEART RATE: 68 BPM | OXYGEN SATURATION: 96 % | RESPIRATION RATE: 19 BRPM | SYSTOLIC BLOOD PRESSURE: 114 MMHG | TEMPERATURE: 98 F | HEIGHT: 72 IN | BODY MASS INDEX: 34.54 KG/M2 | DIASTOLIC BLOOD PRESSURE: 62 MMHG | WEIGHT: 255 LBS

## 2024-11-20 DIAGNOSIS — R80.9 MICROALBUMINURIA: ICD-10-CM

## 2024-11-20 DIAGNOSIS — Z79.4 TYPE 2 DIABETES MELLITUS WITH MICROALBUMINURIA, WITH LONG-TERM CURRENT USE OF INSULIN: Primary | ICD-10-CM

## 2024-11-20 DIAGNOSIS — Z79.899 HIGH RISK MEDICATION USE: ICD-10-CM

## 2024-11-20 DIAGNOSIS — R80.9 TYPE 2 DIABETES MELLITUS WITH MICROALBUMINURIA, WITH LONG-TERM CURRENT USE OF INSULIN: Primary | ICD-10-CM

## 2024-11-20 DIAGNOSIS — Z23 NEED FOR VACCINATION AGAINST STREPTOCOCCUS PNEUMONIAE: ICD-10-CM

## 2024-11-20 DIAGNOSIS — E11.29 TYPE 2 DIABETES MELLITUS WITH MICROALBUMINURIA, WITH LONG-TERM CURRENT USE OF INSULIN: Primary | ICD-10-CM

## 2024-11-20 LAB
EST. AVERAGE GLUCOSE BLD GHB EST-MCNC: 217 MG/DL
HBA1C MFR BLD HPLC: 9.2 %
POTASSIUM SERPL-SCNC: 4.2 MMOL/L (ref 3.5–5.1)

## 2024-11-20 PROCEDURE — G0009 ADMIN PNEUMOCOCCAL VACCINE: HCPCS | Mod: ,,, | Performed by: FAMILY MEDICINE

## 2024-11-20 PROCEDURE — 1159F MED LIST DOCD IN RCRD: CPT | Mod: ,,, | Performed by: FAMILY MEDICINE

## 2024-11-20 PROCEDURE — 3288F FALL RISK ASSESSMENT DOCD: CPT | Mod: ,,, | Performed by: FAMILY MEDICINE

## 2024-11-20 PROCEDURE — 90677 PCV20 VACCINE IM: CPT | Mod: ,,, | Performed by: FAMILY MEDICINE

## 2024-11-20 PROCEDURE — 99213 OFFICE O/P EST LOW 20 MIN: CPT | Mod: 25,,, | Performed by: FAMILY MEDICINE

## 2024-11-20 PROCEDURE — 1101F PT FALLS ASSESS-DOCD LE1/YR: CPT | Mod: ,,, | Performed by: FAMILY MEDICINE

## 2024-11-20 PROCEDURE — 1126F AMNT PAIN NOTED NONE PRSNT: CPT | Mod: ,,, | Performed by: FAMILY MEDICINE

## 2024-11-20 PROCEDURE — 3078F DIAST BP <80 MM HG: CPT | Mod: ,,, | Performed by: FAMILY MEDICINE

## 2024-11-20 PROCEDURE — 1160F RVW MEDS BY RX/DR IN RCRD: CPT | Mod: ,,, | Performed by: FAMILY MEDICINE

## 2024-11-20 PROCEDURE — 83036 HEMOGLOBIN GLYCOSYLATED A1C: CPT | Mod: ,,, | Performed by: CLINICAL MEDICAL LABORATORY

## 2024-11-20 PROCEDURE — 3074F SYST BP LT 130 MM HG: CPT | Mod: ,,, | Performed by: FAMILY MEDICINE

## 2024-11-20 RX ORDER — FLASH GLUCOSE SCANNING READER
EACH MISCELLANEOUS
Qty: 1 EACH | Refills: 1 | Status: SHIPPED | OUTPATIENT
Start: 2024-11-20

## 2024-11-20 RX ORDER — FLASH GLUCOSE SENSOR
KIT MISCELLANEOUS
Qty: 1 KIT | Refills: 5 | Status: SHIPPED | OUTPATIENT
Start: 2024-11-20

## 2024-11-20 RX ORDER — FINERENONE 10 MG/1
10 TABLET, FILM COATED ORAL DAILY
Qty: 30 TABLET | Refills: 1 | Status: SHIPPED | OUTPATIENT
Start: 2024-11-20

## 2024-11-20 RX ORDER — ATORVASTATIN CALCIUM 20 MG/1
20 TABLET, FILM COATED ORAL DAILY
Qty: 90 TABLET | Refills: 3 | Status: SHIPPED | OUTPATIENT
Start: 2024-11-20 | End: 2025-11-20

## 2024-11-20 NOTE — PROGRESS NOTES
Davon Barrios is a 74 y.o. male seen today for lab follow-up.  Unfortunately, his GFR was down to 36 and the patient's wife will arrange follow-up with a nephrologist.  Also his urine microalbumin was now over 300 despite the use of enalapril and Farxiga.  His LDL is also elevated at 101 and we discussed a proper diet as well as increasing his atorvastatin to 20 mg q.h.s..  We also discussed a trial of Karendia to help with his microalbumin.  The patient will continue to use his Mirtha system to help monitor his glucose.      Past Medical History:   Diagnosis Date    Carcinoma of prostate     Hypertension     Sleep apnea     Type 2 diabetes mellitus     Vitamin B 12 deficiency(non anemic)      Family History   Problem Relation Name Age of Onset    Diabetes Mother      Rectal cancer Father      Diabetes Father      Hypertension Father      Lung cancer Father      Hypertension Sister      Hypertension Brother       Current Outpatient Medications on File Prior to Visit   Medication Sig Dispense Refill    ADULT LOW DOSE ASPIRIN ORAL Take 81 mg by mouth once daily.      ascorbic Acid (VITAMIN C) 500 mg CpSR Take 500 mg by mouth once daily.      brimonidine 0.15 % OPTH DROP (ALPHAGAN) 0.15 % ophthalmic solution       carvediloL (COREG) 3.125 MG tablet Take 1 tablet (3.125 mg total) by mouth 2 (two) times daily. 180 tablet 1    citalopram (CELEXA) 20 MG tablet Take 1 tablet (20 mg total) by mouth once daily. 90 tablet 1    dapagliflozin propanediol (FARXIGA) 10 mg tablet Take 1 tablet (10 mg total) by mouth once daily. 90 tablet 1    enalapril (VASOTEC) 20 MG tablet Take 1 tablet (20 mg total) by mouth once daily. as directed 90 tablet 1    ergocalciferol (ERGOCALCIFEROL) 50,000 unit Cap 1 capsule.      flash glucose scanning reader (FREESTYLE MIRTHA 2 READER) [facility] [place] Use to monitor glucose 1 each 1    flash glucose sensor (FREESTYLE MIRTHA 2 SENSOR) Kit Place one sensor on back of arm every 14 days to monitor glucose levels.  "1 kit 5    furosemide (LASIX) 40 MG tablet Take 20 mg by mouth once daily.      gabapentin (NEURONTIN) 300 MG capsule Take 1 capsule (300 mg total) by mouth 3 (three) times daily. 270 capsule 1    insulin NPH-insulin regular, 70/30, (NOVOLIN 70/30) 100 unit/mL (70-30) injection Inject 40 units subcutaneously every morning and inject 32 units subcutaneously every evening      insulin syringe-needle U-100 (BD INSULIN SYRINGE) 1 mL 25 gauge x 5/8" Syrg BD Insulin Syringe 1 mL 25 gauge x 5/8"      latanoprost 0.005 % ophthalmic solution latanoprost 0.005 % eye drops      vitamin E 100 UNIT capsule Take 100 capsules by mouth once daily.      [DISCONTINUED] atorvastatin (LIPITOR) 10 MG tablet Take 1 tablet (10 mg total) by mouth once daily. 90 tablet 1    fluticasone propionate (FLONASE) 50 mcg/actuation nasal spray 2 sprays (100 mcg total) by Each Nostril route once daily. (Patient not taking: Reported on 11/20/2024) 16 g 2    leuprolide (LUPRON) 30 mg injection Inject 30 mg into the muscle every 6 (six) months.      mirtazapine (REMERON) 7.5 MG Tab Take 1 tablet (7.5 mg total) by mouth every evening. (Patient not taking: Reported on 11/20/2024) 30 tablet 3     No current facility-administered medications on file prior to visit.     Immunization History   Administered Date(s) Administered    COVID-19, MRNA, LN-S, PF (MODERNA FULL 0.5 ML DOSE) 03/05/2021, 04/06/2021, 11/19/2021    Influenza - Quadrivalent - PF *Preferred* (6 months and older) 09/16/2021    Influenza - Trivalent - Fluad - Adjuvanted - PF (65 years and older 10/21/2024    Influenza - Trivalent - Fluzone High Dose - PF (65 years and older) 03/01/2021    Influenza Split 01/01/2015    Pneumococcal Conjugate - 13 Valent 03/01/2021    Pneumococcal Polysaccharide - 23 Valent 09/03/2014       Review of Systems   Constitutional:  Negative for fever, malaise/fatigue and weight loss.   Respiratory:  Negative for shortness of breath.    Cardiovascular:  Negative for " chest pain and palpitations.   Gastrointestinal:  Negative for nausea and vomiting.   Psychiatric/Behavioral:  Negative for depression.         Vitals:    11/20/24 0946   BP: 114/62   Pulse: 68   Resp: 19   Temp: 97.7 °F (36.5 °C)       Physical Exam  Vitals reviewed.   Eyes:      Conjunctiva/sclera: Conjunctivae normal.   Pulmonary:      Effort: Pulmonary effort is normal.   Neurological:      Mental Status: He is alert.   Psychiatric:         Mood and Affect: Mood normal.         Behavior: Behavior normal.         Thought Content: Thought content normal.         Judgment: Judgment normal.          Assessment and Plan  1. Type 2 diabetes mellitus with microalbuminuria, with long-term current use of insulin  -     Hemoglobin A1C; Future; Expected date: 11/20/2024  -     atorvastatin (LIPITOR) 20 MG tablet; Take 1 tablet (20 mg total) by mouth once daily.  Dispense: 90 tablet; Refill: 3    2. Microalbuminuria  -     finerenone (KERENDIA) 10 mg Tab; Take 1 tablet by mouth once daily.  Dispense: 30 tablet; Refill: 1    3. Need for vaccination against Streptococcus pneumoniae  -     pneumoc 20-francisco conj-dip cr(PF) (PREVNAR-20 (PF)) injection Syrg 0.5 mL    4. High risk medication use  -     Potassium; Future; Expected date: 12/20/2024             Return to clinic in one month for follow-up potassium level and then in 3 months for repeat A1c and microalbumin.  We will plan on rechecking his lipids in 6 months.    Health Maintenance Topics with due status: Not Due       Topic Last Completion Date    Hemoglobin A1c 06/25/2024    Diabetes Urine Screening 10/21/2024    Lipid Panel 10/21/2024

## 2024-11-30 ENCOUNTER — EXTERNAL CHRONIC CARE MANAGEMENT (OUTPATIENT)
Dept: FAMILY MEDICINE | Facility: CLINIC | Age: 74
End: 2024-11-30
Payer: MEDICARE

## 2024-11-30 PROCEDURE — G0511 CCM/BHI BY RHC/FQHC 20MIN MO: HCPCS | Mod: ,,, | Performed by: FAMILY MEDICINE

## 2024-12-12 DIAGNOSIS — Z79.899 OTHER LONG TERM (CURRENT) DRUG THERAPY: Primary | ICD-10-CM

## 2024-12-31 ENCOUNTER — EXTERNAL CHRONIC CARE MANAGEMENT (OUTPATIENT)
Dept: FAMILY MEDICINE | Facility: CLINIC | Age: 74
End: 2024-12-31
Payer: MEDICARE

## 2024-12-31 PROCEDURE — G0511 CCM/BHI BY RHC/FQHC 20MIN MO: HCPCS | Mod: ,,, | Performed by: FAMILY MEDICINE

## 2025-01-27 ENCOUNTER — HOSPITAL ENCOUNTER (EMERGENCY)
Facility: HOSPITAL | Age: 75
Discharge: HOME | End: 2025-01-27
Payer: MEDICARE

## 2025-01-27 VITALS
TEMPERATURE: 98 F | HEART RATE: 59 BPM | HEIGHT: 72 IN | SYSTOLIC BLOOD PRESSURE: 144 MMHG | BODY MASS INDEX: 34.13 KG/M2 | RESPIRATION RATE: 20 BRPM | DIASTOLIC BLOOD PRESSURE: 61 MMHG | WEIGHT: 252 LBS | OXYGEN SATURATION: 97 %

## 2025-01-27 DIAGNOSIS — R55 NEAR SYNCOPE: Primary | ICD-10-CM

## 2025-01-27 LAB
ANION GAP SERPL CALCULATED.3IONS-SCNC: 15 MMOL/L (ref 7–16)
BASOPHILS # BLD AUTO: 0.03 K/UL (ref 0–0.2)
BASOPHILS NFR BLD AUTO: 0.3 % (ref 0–1)
BUN SERPL-MCNC: 54 MG/DL (ref 8–26)
BUN/CREAT SERPL: 20 (ref 6–20)
CALCIUM SERPL-MCNC: 9.3 MG/DL (ref 8.8–10)
CHLORIDE SERPL-SCNC: 106 MMOL/L (ref 98–107)
CO2 SERPL-SCNC: 22 MMOL/L (ref 23–31)
CREAT SERPL-MCNC: 2.66 MG/DL (ref 0.72–1.25)
DIFFERENTIAL METHOD BLD: ABNORMAL
EGFR (NO RACE VARIABLE) (RUSH/TITUS): 24 ML/MIN/1.73M2
EOSINOPHIL # BLD AUTO: 0.09 K/UL (ref 0–0.5)
EOSINOPHIL NFR BLD AUTO: 0.8 % (ref 1–4)
ERYTHROCYTE [DISTWIDTH] IN BLOOD BY AUTOMATED COUNT: 13.7 % (ref 11.5–14.5)
GLUCOSE SERPL-MCNC: 244 MG/DL (ref 82–115)
HCT VFR BLD AUTO: 39.2 % (ref 40–54)
HGB BLD-MCNC: 12.7 G/DL (ref 13.5–18)
LYMPHOCYTES # BLD AUTO: 3.27 K/UL (ref 1–4.8)
LYMPHOCYTES NFR BLD AUTO: 30 % (ref 27–41)
MAGNESIUM SERPL-MCNC: 2.3 MG/DL (ref 1.6–2.6)
MCH RBC QN AUTO: 30.5 PG (ref 27–31)
MCHC RBC AUTO-ENTMCNC: 32.4 G/DL (ref 32–36)
MCV RBC AUTO: 94.2 FL (ref 80–96)
MONOCYTES # BLD AUTO: 0.87 K/UL (ref 0–0.8)
MONOCYTES NFR BLD AUTO: 8 % (ref 2–6)
MPC BLD CALC-MCNC: 10.8 FL (ref 9.4–12.4)
NEUTROPHILS # BLD AUTO: 6.65 K/UL (ref 1.8–7.7)
NEUTROPHILS NFR BLD AUTO: 60.9 % (ref 53–65)
PLATELET # BLD AUTO: 177 K/UL (ref 150–400)
POTASSIUM SERPL-SCNC: 4.9 MMOL/L (ref 3.5–5.1)
RBC # BLD AUTO: 4.16 M/UL (ref 4.6–6.2)
SODIUM SERPL-SCNC: 138 MMOL/L (ref 136–145)
TROPONIN I SERPL HS-MCNC: 3.9 NG/L
WBC # BLD AUTO: 10.91 K/UL (ref 4.5–11)

## 2025-01-27 PROCEDURE — 84484 ASSAY OF TROPONIN QUANT: CPT | Performed by: NURSE PRACTITIONER

## 2025-01-27 PROCEDURE — 93005 ELECTROCARDIOGRAM TRACING: CPT

## 2025-01-27 PROCEDURE — 80048 BASIC METABOLIC PNL TOTAL CA: CPT | Performed by: NURSE PRACTITIONER

## 2025-01-27 PROCEDURE — 99285 EMERGENCY DEPT VISIT HI MDM: CPT | Mod: 25

## 2025-01-27 PROCEDURE — 36415 COLL VENOUS BLD VENIPUNCTURE: CPT | Performed by: NURSE PRACTITIONER

## 2025-01-27 PROCEDURE — 99284 EMERGENCY DEPT VISIT MOD MDM: CPT | Mod: ,,, | Performed by: NURSE PRACTITIONER

## 2025-01-27 PROCEDURE — 85025 COMPLETE CBC W/AUTO DIFF WBC: CPT | Performed by: NURSE PRACTITIONER

## 2025-01-27 PROCEDURE — 83735 ASSAY OF MAGNESIUM: CPT | Performed by: NURSE PRACTITIONER

## 2025-01-27 RX ORDER — MULTIVITAMIN
1 TABLET ORAL DAILY
COMMUNITY

## 2025-01-27 NOTE — ED PROVIDER NOTES
Encounter Date: 1/27/2025       History     Chief Complaint   Patient presents with    Fall     In shower and got weak and fell, states wasn't a direct fall, hit neck on side of shower, no LOC no blood thinners     Patient presents following a near syncopal episode in the shower.  Reports becoming lightheaded while in the shower, brace by the side walls and slid down into the shower.  His wife was there to help.  No loss of consciousness.  Patient did not strike his head but reports neck pain after sliding down rather awkwardly.  He reports this has happened several times previously.  Has been worked up and evaluated by Cardiology without clear etiology.  Was attributed to vasovagal syndrome.  He is followed by Dr. Phelps, cardiology.      Review of patient's allergies indicates:  No Known Allergies  Past Medical History:   Diagnosis Date    Carcinoma of prostate     History of kidney cancer     removed right kidney 2021    Hypertension     Sleep apnea     Type 2 diabetes mellitus     Vitamin B 12 deficiency(non anemic)      Past Surgical History:   Procedure Laterality Date    knee scope Left     NEPHRECTOMY Right     PROSTATE SURGERY      TOTAL KNEE REPLACEMENT USING COMPUTER NAVIGATION Left      Family History   Problem Relation Name Age of Onset    Diabetes Mother      Rectal cancer Father      Diabetes Father      Hypertension Father      Lung cancer Father      Hypertension Sister      Hypertension Brother       Social History     Tobacco Use    Smoking status: Never     Passive exposure: Never    Smokeless tobacco: Never   Substance Use Topics    Alcohol use: Never    Drug use: Never     Review of Systems   Constitutional:  Negative for fever.   Respiratory: Negative.     Cardiovascular: Negative.    Neurological: Negative.  Syncope: near-syncope.       Physical Exam     Initial Vitals [01/27/25 1227]   BP Pulse Resp Temp SpO2   133/73 64 20 97.8 °F (36.6 °C) 97 %      MAP       --         Physical  Exam    Nursing note and vitals reviewed.  Constitutional: No distress.   HENT:   Head: Normocephalic and atraumatic.   Eyes: EOM are normal. Pupils are equal, round, and reactive to light.   Neck: Neck supple.   No spinal or paraspinal tenderness   Normal range of motion.  Cardiovascular:  Normal rate, regular rhythm and normal heart sounds.           Pulmonary/Chest: Breath sounds normal. No respiratory distress.   Abdominal: Abdomen is soft. There is no abdominal tenderness.   Musculoskeletal:         General: Normal range of motion.      Cervical back: Normal range of motion and neck supple.     Neurological: He is alert. GCS score is 15. GCS eye subscore is 4. GCS verbal subscore is 5. GCS motor subscore is 6.   Skin: Skin is warm and dry. Capillary refill takes less than 2 seconds.         Medical Screening Exam   See Full Note    ED Course   Procedures  Labs Reviewed   BASIC METABOLIC PANEL - Abnormal       Result Value    Sodium 138      Potassium 4.9      Chloride 106      CO2 22 (*)     Anion Gap 15      Glucose 244 (*)     BUN 54 (*)     Creatinine 2.66 (*)     BUN/Creatinine Ratio 20      Calcium 9.3      eGFR 24 (*)    CBC WITH DIFFERENTIAL - Abnormal    WBC 10.91      RBC 4.16 (*)     Hemoglobin 12.7 (*)     Hematocrit 39.2 (*)     MCV 94.2      MCH 30.5      MCHC 32.4      RDW 13.7      Platelet Count 177      MPV 10.8      Neutrophils % 60.9      Lymphocytes % 30.0      Neutrophils, Abs 6.65      Lymphocytes, Absolute 3.27      Diff Type Auto      Monocytes % 8.0 (*)     Eosinophils % 0.8 (*)     Basophils % 0.3      Monocytes, Absolute 0.87 (*)     Eosinophils, Absolute 0.09      Basophils, Absolute 0.03     MAGNESIUM - Normal    Magnesium 2.3     TROPONIN I - Normal    Troponin I High Sensitivity 3.9     CBC W/ AUTO DIFFERENTIAL    Narrative:     The following orders were created for panel order CBC auto differential.  Procedure                               Abnormality         Status                      ---------                               -----------         ------                     CBC with Differential[[id number]]       Abnormal            Final result                 Please view results for these tests on the individual orders.          Imaging Results              CT Cervical Spine Without Contrast (Final result)  Result time 01/27/25 14:12:47      Final result by Johann Gamez MD (01/27/25 14:12:47)                   Impression:      1. Left frontal scalp mild soft tissue swelling/contusion without displaced skull fracture or acute intracranial abnormality identified.  Suspected sequela of chronic microvascular ischemic change with cerebral volume loss.  2. No CT evidence of cervical spine acute osseous traumatic injury, noting suspected remote trauma to the dens, as further discussed in the body of the report.  Clinical correlation advised.  3. Cervical spondylosis resulting in at most mild acquired canal stenosis at C5-6 and C6-7 levels and moderate neural foraminal narrowing at left C3-4.  4. Suspected bilateral thyroid nodules.  Further evaluation with elective/nonemergent thyroid ultrasound can be obtained as warranted.  5. Other incidental/nonemergent findings in the body of the report.      Electronically signed by: Johann Gamez MD  Date:    01/27/2025  Time:    14:12               Narrative:    EXAMINATION:  CT HEAD WITHOUT CONTRAST; CT CERVICAL SPINE WITHOUT CONTRAST    CLINICAL HISTORY:  Facial trauma, blunt;; Neck trauma, dangerous injury mechanism (Age 16-64y);    TECHNIQUE:  Low dose axial CT images obtained throughout the head and cervical spine without intravenous contrast. Sagittal and coronal reconstructions were performed.    COMPARISON:  Reports only for outside facility head CT 08/05/2020 and MRI brain 08/06/2020    FINDINGS:  Head CT:    Intracranial compartment: Brain appears normally formed. Age-related generalized cerebral volume loss.  Ventricles are midline without  distortion by mass effect or acute hydrocephalus.  No extra-axial blood or fluid collections.    Patchy hypoattenuation of the supratentorial white matter consistent with chronic microvascular ischemic change.  No parenchymal mass, hemorrhage, edema or major vascular distribution infarct.  Skull base atherosclerotic vascular calcifications noted.    Skull/extracranial contents (limited evaluation): Suspected mild localized soft tissue swelling/contusion overlying the left frontal calvarium.  No fracture. Mastoid air cells and paranasal sinuses are essentially clear.  Remote operative change at the bilateral globes and ocular lenses.    Cervical spine CT: Straightening of the cervical lordosis.  There is well corticated radiolucent gap at the upper 3rd of the dens just below the level of the anterior arch of C1.  Few small well corticated ossific bodies posterior to the upper dens and at the radiolucent gap and a solitary well corticated ossific body just beneath the anterior arch of C1.  Findings are likely related to remote trauma/fracture of the dens with nonunion.  Remaining vertebral body heights appear relatively maintained.  No evidence for acute vertebral compression fracture.  No acute displaced fracture, dislocation or significant listhesis.  No destructive osseous process.  No prevertebral soft tissue thickening.  No paraspinal mass or fluid collection.  No subcutaneous emphysema or radiopaque foreign body.  Mild degenerative change at the atlantodental interval.  Lateral masses appear intact.  No occipital condylar fracture.  Intervertebral disc space heights appear relatively maintained.  Partially calcified soft tissue pannus posterior to the upper dens resulting in mild acquired canal stenosis.  Small posterior disc osteophyte complexes resulting in minimal to mild acquired canal stenosis at C5-6 and C6-7 levels.  Multilevel facet arthrosis resulting in at most moderate neural foraminal narrowing at  left C3-4.  Minimal to mild neural foraminal narrowing elsewhere.    Included airway remains relatively midline and patent.  Imaged lung apices are clear without pneumothorax.  Scattered atherosclerotic vascular calcifications noted.  Bilateral thyroid lobes appear prominent with scattered hypoattenuating parenchymal foci suggesting nodules.  The 1 on the right has a small peripheral calcification.                                       CT Head Without Contrast (Final result)  Result time 01/27/25 14:12:47      Final result by Johann Gamez MD (01/27/25 14:12:47)                   Impression:      1. Left frontal scalp mild soft tissue swelling/contusion without displaced skull fracture or acute intracranial abnormality identified.  Suspected sequela of chronic microvascular ischemic change with cerebral volume loss.  2. No CT evidence of cervical spine acute osseous traumatic injury, noting suspected remote trauma to the dens, as further discussed in the body of the report.  Clinical correlation advised.  3. Cervical spondylosis resulting in at most mild acquired canal stenosis at C5-6 and C6-7 levels and moderate neural foraminal narrowing at left C3-4.  4. Suspected bilateral thyroid nodules.  Further evaluation with elective/nonemergent thyroid ultrasound can be obtained as warranted.  5. Other incidental/nonemergent findings in the body of the report.      Electronically signed by: Johann Gamez MD  Date:    01/27/2025  Time:    14:12               Narrative:    EXAMINATION:  CT HEAD WITHOUT CONTRAST; CT CERVICAL SPINE WITHOUT CONTRAST    CLINICAL HISTORY:  Facial trauma, blunt;; Neck trauma, dangerous injury mechanism (Age 16-64y);    TECHNIQUE:  Low dose axial CT images obtained throughout the head and cervical spine without intravenous contrast. Sagittal and coronal reconstructions were performed.    COMPARISON:  Reports only for outside facility head CT 08/05/2020 and MRI brain  08/06/2020    FINDINGS:  Head CT:    Intracranial compartment: Brain appears normally formed. Age-related generalized cerebral volume loss.  Ventricles are midline without distortion by mass effect or acute hydrocephalus.  No extra-axial blood or fluid collections.    Patchy hypoattenuation of the supratentorial white matter consistent with chronic microvascular ischemic change.  No parenchymal mass, hemorrhage, edema or major vascular distribution infarct.  Skull base atherosclerotic vascular calcifications noted.    Skull/extracranial contents (limited evaluation): Suspected mild localized soft tissue swelling/contusion overlying the left frontal calvarium.  No fracture. Mastoid air cells and paranasal sinuses are essentially clear.  Remote operative change at the bilateral globes and ocular lenses.    Cervical spine CT: Straightening of the cervical lordosis.  There is well corticated radiolucent gap at the upper 3rd of the dens just below the level of the anterior arch of C1.  Few small well corticated ossific bodies posterior to the upper dens and at the radiolucent gap and a solitary well corticated ossific body just beneath the anterior arch of C1.  Findings are likely related to remote trauma/fracture of the dens with nonunion.  Remaining vertebral body heights appear relatively maintained.  No evidence for acute vertebral compression fracture.  No acute displaced fracture, dislocation or significant listhesis.  No destructive osseous process.  No prevertebral soft tissue thickening.  No paraspinal mass or fluid collection.  No subcutaneous emphysema or radiopaque foreign body.  Mild degenerative change at the atlantodental interval.  Lateral masses appear intact.  No occipital condylar fracture.  Intervertebral disc space heights appear relatively maintained.  Partially calcified soft tissue pannus posterior to the upper dens resulting in mild acquired canal stenosis.  Small posterior disc osteophyte  complexes resulting in minimal to mild acquired canal stenosis at C5-6 and C6-7 levels.  Multilevel facet arthrosis resulting in at most moderate neural foraminal narrowing at left C3-4.  Minimal to mild neural foraminal narrowing elsewhere.    Included airway remains relatively midline and patent.  Imaged lung apices are clear without pneumothorax.  Scattered atherosclerotic vascular calcifications noted.  Bilateral thyroid lobes appear prominent with scattered hypoattenuating parenchymal foci suggesting nodules.  The 1 on the right has a small peripheral calcification.                                       Medications - No data to display  Medical Decision Making  EKG shows a junctional rhythm at 59 beats per minute.  Heart rate has consistently been 55-65 beats per minute while in the ER.  He is asymptomatic.  Previous EKG showed an AV block with a rate of 60.  Electrolytes within normal limits.  Creatinine 2.66, compared to previous 1.8/1.9.  Is followed by Dr. Alvarez, nephrology.    Given the chronicity of this event and stability of the patient, he can follow up outpatient.  Appointment scheduled with Cardiology tomorrow morning.  Should also follow-up with primary care repeat labs and possibly nephrology if persistent worsening of renal function.    Amount and/or Complexity of Data Reviewed  Labs: ordered.  Radiology: ordered.                                      Clinical Impression:   Final diagnoses:  [R55] Near syncope (Primary)        ED Disposition Condition    Discharge Stable          ED Prescriptions    None       Follow-up Information       Follow up With Specialties Details Why Contact Info    David Mendiola, JENNY Family Medicine Go on 1/28/2025 at 08:20 A.M. [address] [facility] [address]  [phone]      Oc Song MD Family Medicine Schedule an appointment as soon as possible for a visit   [address] [place], [place] [address]  [facility] [place]  [address]  [phone]      Antonio Alvarez MD Nephrology   [address] [facility] [address]  [facility] [address]  [phone]               Charles Carl, [facility]  01/27/25 0619

## 2025-01-27 NOTE — DISCHARGE INSTRUCTIONS
Follow-up with Cardiology.  Follow-up with primary care/nephrology for repeat renal function.  Return to the ER if acute worsening or concerns otherwise.

## 2025-01-29 ENCOUNTER — TELEPHONE (OUTPATIENT)
Dept: EMERGENCY MEDICINE | Facility: HOSPITAL | Age: 75
End: 2025-01-29
Payer: MEDICARE

## 2025-01-31 ENCOUNTER — EXTERNAL CHRONIC CARE MANAGEMENT (OUTPATIENT)
Dept: FAMILY MEDICINE | Facility: CLINIC | Age: 75
End: 2025-01-31
Payer: MEDICARE

## 2025-01-31 PROCEDURE — G0511 CCM/BHI BY RHC/FQHC 20MIN MO: HCPCS | Mod: ,,, | Performed by: FAMILY MEDICINE

## 2025-02-25 LAB
OHS QRS DURATION: 100 MS
OHS QTC CALCULATION: 439 MS

## 2025-02-28 ENCOUNTER — EXTERNAL CHRONIC CARE MANAGEMENT (OUTPATIENT)
Dept: FAMILY MEDICINE | Facility: CLINIC | Age: 75
End: 2025-02-28
Payer: MEDICARE

## 2025-02-28 PROCEDURE — G0511 CCM/BHI BY RHC/FQHC 20MIN MO: HCPCS | Mod: ,,, | Performed by: FAMILY MEDICINE

## 2025-03-03 ENCOUNTER — PATIENT OUTREACH (OUTPATIENT)
Dept: ADMINISTRATIVE | Facility: CLINIC | Age: 75
End: 2025-03-03

## 2025-03-03 ENCOUNTER — EXTERNAL HOSPITAL ADMISSION (OUTPATIENT)
Dept: ADMINISTRATIVE | Facility: CLINIC | Age: 75
End: 2025-03-03

## 2025-03-03 NOTE — PROGRESS NOTES
C3 nurse attempted to contact Daovn Barrios  for a TCC post hospital discharge follow up call. No answer. Left voicemail with callback information. The patient has a scheduled HOSFU appointment with Oc Song MD  on 3/5/25 @ 145.

## 2025-03-04 NOTE — PROGRESS NOTES
C3 nurse spoke with wife Shaneka Barrios for a TCC post hospital discharge follow up call. The patient has a scheduled [facility] appointment with Oc Song MD  on 3/5/25 @ 145.  Wife reports patient taking the following medication not listed in Epic:OS-PREM 500 500 mg daily, vitamin D 3 50 mcg daily and vitamin E 400 u daily. Wife advised to take all medications to PCP appointment.

## 2025-03-14 ENCOUNTER — TELEPHONE (OUTPATIENT)
Dept: FAMILY MEDICINE | Facility: CLINIC | Age: 75
End: 2025-03-14
Payer: MEDICARE

## 2025-03-14 NOTE — TELEPHONE ENCOUNTER
Per Dr. Song notified patient's spouse to decrease his evening dosage of Novolin 70/30 from 32 Units to 20 Units. Patient's spouse correctly read back the med change. Notified her to f/u up with us and let us know how it is doing on Monday. I also scheduled an appointment for him next week. She voiced understanding.

## 2025-03-14 NOTE — TELEPHONE ENCOUNTER
----- Message from Sherine sent at 3/14/2025  9:54 AM CDT -----  Regarding: Pt Question  Pt's wife, Shaneka, called stating that pt's blood sugar has been dropping low at night lately, last night it dropped to 35. She states she does 40 units of his insulin(insulin NPH-insulin regular, 70/30, (NOVOLIN 70/30) 100 unit/mL (70-30) injection) in the morning and 32 units at night, but that he doesn't eat much during the day. She asks if she needs to be increasing the units he does at night and if so, to what. Phone #: [phone]

## 2025-03-19 ENCOUNTER — OFFICE VISIT (OUTPATIENT)
Dept: FAMILY MEDICINE | Facility: CLINIC | Age: 75
End: 2025-03-19
Payer: MEDICARE

## 2025-03-19 VITALS
BODY MASS INDEX: 32.78 KG/M2 | RESPIRATION RATE: 18 BRPM | OXYGEN SATURATION: 98 % | HEIGHT: 72 IN | HEART RATE: 89 BPM | WEIGHT: 242 LBS | DIASTOLIC BLOOD PRESSURE: 60 MMHG | SYSTOLIC BLOOD PRESSURE: 104 MMHG

## 2025-03-19 DIAGNOSIS — E11.29 TYPE 2 DIABETES MELLITUS WITH MICROALBUMINURIA, WITH LONG-TERM CURRENT USE OF INSULIN: Primary | ICD-10-CM

## 2025-03-19 DIAGNOSIS — F32.A DEPRESSION, UNSPECIFIED DEPRESSION TYPE: ICD-10-CM

## 2025-03-19 DIAGNOSIS — R80.9 TYPE 2 DIABETES MELLITUS WITH MICROALBUMINURIA, WITH LONG-TERM CURRENT USE OF INSULIN: Primary | ICD-10-CM

## 2025-03-19 DIAGNOSIS — I10 HYPERTENSION, UNSPECIFIED TYPE: ICD-10-CM

## 2025-03-19 DIAGNOSIS — Z79.4 TYPE 2 DIABETES MELLITUS WITH MICROALBUMINURIA, WITH LONG-TERM CURRENT USE OF INSULIN: Primary | ICD-10-CM

## 2025-03-19 LAB
ALBUMIN SERPL BCP-MCNC: 3.5 G/DL (ref 3.4–4.8)
ALBUMIN/GLOB SERPL: 1.5 {RATIO}
ALP SERPL-CCNC: 79 U/L (ref 40–150)
ALT SERPL W P-5'-P-CCNC: 17 U/L
ANION GAP SERPL CALCULATED.3IONS-SCNC: 17 MMOL/L (ref 7–16)
AST SERPL W P-5'-P-CCNC: 18 U/L (ref 11–45)
BASOPHILS # BLD AUTO: 0.03 K/UL (ref 0–0.2)
BASOPHILS NFR BLD AUTO: 0.4 % (ref 0–1)
BILIRUB SERPL-MCNC: 0.3 MG/DL
BUN SERPL-MCNC: 28 MG/DL (ref 8–26)
BUN/CREAT SERPL: 15 (ref 6–20)
CALCIUM SERPL-MCNC: 8.8 MG/DL (ref 8.8–10)
CHLORIDE SERPL-SCNC: 103 MMOL/L (ref 98–107)
CO2 SERPL-SCNC: 21 MMOL/L (ref 23–31)
CREAT SERPL-MCNC: 1.93 MG/DL (ref 0.72–1.25)
DIFFERENTIAL METHOD BLD: ABNORMAL
EGFR (NO RACE VARIABLE) (RUSH/TITUS): 35 ML/MIN/1.73M2
EOSINOPHIL # BLD AUTO: 0.08 K/UL (ref 0–0.5)
EOSINOPHIL NFR BLD AUTO: 1 % (ref 1–4)
ERYTHROCYTE [DISTWIDTH] IN BLOOD BY AUTOMATED COUNT: 13.4 % (ref 11.5–14.5)
EST. AVERAGE GLUCOSE BLD GHB EST-MCNC: 189 MG/DL
GLOBULIN SER-MCNC: 2.4 G/DL (ref 2–4)
GLUCOSE SERPL-MCNC: 359 MG/DL (ref 82–115)
HBA1C MFR BLD HPLC: 8.2 %
HCT VFR BLD AUTO: 41.2 % (ref 40–54)
HGB BLD-MCNC: 12.9 G/DL (ref 13.5–18)
IMM GRANULOCYTES # BLD AUTO: 0.02 K/UL (ref 0–0.04)
IMM GRANULOCYTES NFR BLD: 0.3 % (ref 0–0.4)
LYMPHOCYTES # BLD AUTO: 2.55 K/UL (ref 1–4.8)
LYMPHOCYTES NFR BLD AUTO: 33.2 % (ref 27–41)
MCH RBC QN AUTO: 29.5 PG (ref 27–31)
MCHC RBC AUTO-ENTMCNC: 31.3 G/DL (ref 32–36)
MCV RBC AUTO: 94.3 FL (ref 80–96)
MONOCYTES # BLD AUTO: 0.54 K/UL (ref 0–0.8)
MONOCYTES NFR BLD AUTO: 7 % (ref 2–6)
MPC BLD CALC-MCNC: 11.5 FL (ref 9.4–12.4)
NEUTROPHILS # BLD AUTO: 4.46 K/UL (ref 1.8–7.7)
NEUTROPHILS NFR BLD AUTO: 58.1 % (ref 53–65)
NRBC # BLD AUTO: 0 X10E3/UL
NRBC, AUTO (.00): 0 %
PLATELET # BLD AUTO: 259 K/UL (ref 150–400)
POTASSIUM SERPL-SCNC: 4.7 MMOL/L (ref 3.5–5.1)
PROT SERPL-MCNC: 5.9 G/DL (ref 5.8–7.6)
RBC # BLD AUTO: 4.37 M/UL (ref 4.6–6.2)
SODIUM SERPL-SCNC: 136 MMOL/L (ref 136–145)
WBC # BLD AUTO: 7.68 K/UL (ref 4.5–11)

## 2025-03-19 PROCEDURE — 1125F AMNT PAIN NOTED PAIN PRSNT: CPT | Mod: ,,, | Performed by: FAMILY MEDICINE

## 2025-03-19 PROCEDURE — 1160F RVW MEDS BY RX/DR IN RCRD: CPT | Mod: ,,, | Performed by: FAMILY MEDICINE

## 2025-03-19 PROCEDURE — 99214 OFFICE O/P EST MOD 30 MIN: CPT | Mod: ,,, | Performed by: FAMILY MEDICINE

## 2025-03-19 PROCEDURE — 3078F DIAST BP <80 MM HG: CPT | Mod: ,,, | Performed by: FAMILY MEDICINE

## 2025-03-19 PROCEDURE — 3288F FALL RISK ASSESSMENT DOCD: CPT | Mod: ,,, | Performed by: FAMILY MEDICINE

## 2025-03-19 PROCEDURE — 1159F MED LIST DOCD IN RCRD: CPT | Mod: ,,, | Performed by: FAMILY MEDICINE

## 2025-03-19 PROCEDURE — 85025 COMPLETE CBC W/AUTO DIFF WBC: CPT | Mod: ,,, | Performed by: CLINICAL MEDICAL LABORATORY

## 2025-03-19 PROCEDURE — 3074F SYST BP LT 130 MM HG: CPT | Mod: ,,, | Performed by: FAMILY MEDICINE

## 2025-03-19 PROCEDURE — 1101F PT FALLS ASSESS-DOCD LE1/YR: CPT | Mod: ,,, | Performed by: FAMILY MEDICINE

## 2025-03-19 PROCEDURE — 83036 HEMOGLOBIN GLYCOSYLATED A1C: CPT | Mod: ,,, | Performed by: CLINICAL MEDICAL LABORATORY

## 2025-03-19 PROCEDURE — 80053 COMPREHEN METABOLIC PANEL: CPT | Mod: ,,, | Performed by: CLINICAL MEDICAL LABORATORY

## 2025-03-19 RX ORDER — ATORVASTATIN CALCIUM 20 MG/1
20 TABLET, FILM COATED ORAL DAILY
Qty: 90 TABLET | Refills: 3 | Status: SHIPPED | OUTPATIENT
Start: 2025-03-19 | End: 2026-03-19

## 2025-03-19 RX ORDER — CITALOPRAM 10 MG/1
10 TABLET ORAL DAILY
Qty: 90 TABLET | Refills: 1 | Status: SHIPPED | OUTPATIENT
Start: 2025-03-19 | End: 2026-03-19

## 2025-03-19 RX ORDER — FLASH GLUCOSE SENSOR
KIT MISCELLANEOUS
Qty: 2 KIT | Refills: 5 | Status: SHIPPED | OUTPATIENT
Start: 2025-03-19 | End: 2025-03-20 | Stop reason: SDUPTHER

## 2025-03-19 NOTE — PROGRESS NOTES
Davon Barrios is a 74 y.o. male seen today for hospital follow-up for hypoglycemia renal injury and altered mental status.  Unfortunately the patient's still confused and has declined since our last visit.  He has a follow up with Neurology scheduled for May and I have asked to see if we can schedule this earlier.  Since his hospital stay he has been more confused lately.  The patient has had blood sugars that are more stable without hypoglycemia since we decrease his insulin dosing.  His Celexa was discontinued along with his gabapentin while inpatient and he has had increased symptoms of depression since and we discussed starting his Celexa back at 10 mg only.    Past Medical History:   Diagnosis Date    Carcinoma of prostate     History of kidney cancer     removed right kidney 2021    Hypertension     Sleep apnea     Type 2 diabetes mellitus     Vitamin B 12 deficiency(non anemic)      Family History   Problem Relation Name Age of Onset    Diabetes Mother      Rectal cancer Father      Diabetes Father      Hypertension Father      Lung cancer Father      Hypertension Sister      Hypertension Brother       Medications Ordered Prior to Encounter[1]  Immunization History   Administered Date(s) Administered    COVID-19, MRNA, LN-S, PF (MODERNA FULL 0.5 ML DOSE) 03/05/2021, 04/06/2021, 11/19/2021    Influenza - Quadrivalent - PF *Preferred* (6 months and older) 09/16/2021    Influenza - Trivalent - Fluad - Adjuvanted - PF (65 years and older 10/21/2024    Influenza - Trivalent - Fluzone High Dose - PF (65 years and older) 03/01/2021    Influenza Split 01/01/2015    Pneumococcal Conjugate - 13 Valent 03/01/2021    Pneumococcal Conjugate - 20 Valent 11/20/2024    Pneumococcal Polysaccharide - 23 Valent 09/03/2014       Review of Systems   Constitutional:  Positive for malaise/fatigue. Negative for fever and weight loss.   Respiratory:  Negative for shortness of breath.    Cardiovascular:  Negative for chest pain and  palpitations.   Gastrointestinal:  Negative for nausea and vomiting.   Psychiatric/Behavioral:  Positive for memory loss. Negative for depression.         Vitals:    03/19/25 1448   BP: 104/60   Pulse: 89   Resp: 18       Physical Exam  Vitals reviewed.   Constitutional:       Appearance: Normal appearance.   HENT:      Head: Normocephalic.   Eyes:      Extraocular Movements: Extraocular movements intact.      Conjunctiva/sclera: Conjunctivae normal.      Pupils: Pupils are equal, round, and reactive to light.   Neck:      Thyroid: No thyroid mass or thyromegaly.   Cardiovascular:      Rate and Rhythm: Normal rate and regular rhythm.      Heart sounds: Normal heart sounds. No murmur heard.     No gallop.   Pulmonary:      Effort: Pulmonary effort is normal. No respiratory distress.      Breath sounds: Normal breath sounds. No wheezing or rales.   Skin:     General: Skin is warm and dry.      Coloration: Skin is not jaundiced or pale.   Neurological:      Mental Status: He is alert.      Gait: Gait abnormal.          Assessment and Plan  1. Type 2 diabetes mellitus with microalbuminuria, with long-term current use of insulin  -     Hemoglobin A1C; Future; Expected date: 03/19/2025  -     atorvastatin (LIPITOR) 20 MG tablet; Take 1 tablet (20 mg total) by mouth once daily.  Dispense: 90 tablet; Refill: 3  -     flash glucose sensor (FREESTYLE BRISEIDA 2 SENSOR) Kit; Place one sensor on back of arm every 14 days to monitor glucose levels.  Dispense: 2 kit; Refill: 5    2. Hypertension, unspecified type  -     CBC Auto Differential; Future; Expected date: 03/19/2025  -     Comprehensive Metabolic Panel; Future; Expected date: 03/19/2025    3. Depression, unspecified depression type  -     citalopram (CELEXA) 10 MG tablet; Take 1 tablet (10 mg total) by mouth once daily.  Dispense: 90 tablet; Refill: 1             Return to clinic in 2 weeks or as needed.  We will see if we can move up his urology appointment.    [facility]  "Maintenance Topics with due status: Not Due       Topic Last Completion Date    Diabetes Urine Screening 10/21/2024    Lipid Panel 10/21/2024              [1]   Current Outpatient Medications on File Prior to Visit   Medication Sig Dispense Refill    ADULT LOW DOSE ASPIRIN ORAL Take 81 mg by mouth once daily.      ascorbic Acid (VITAMIN C) 500 mg CpSR Take 500 mg by mouth once daily.      carvediloL (COREG) 3.125 MG tablet Take 1 tablet (3.125 mg total) by mouth 2 (two) times daily. 180 tablet 1    dapagliflozin propanediol (FARXIGA) 10 mg tablet Take 1 tablet (10 mg total) by mouth once daily. 90 tablet 1    enalapril (VASOTEC) 20 MG tablet Take 1 tablet (20 mg total) by mouth once daily. as directed 90 tablet 1    flash glucose scanning reader (FREESTYLE BRISEIDA 2 READER) [facility] Use to monitor glucose 1 each 1    furosemide (LASIX) 40 MG tablet Take 20 mg by mouth once daily.      insulin NPH-insulin regular, 70/30, (NOVOLIN 70/30) 100 unit/mL (70-30) injection Inject 40 units subcutaneously every morning and inject 32 units subcutaneously every evening (Patient taking differently: Inject 40 units subcutaneously every morning and inject 20 units subcutaneously every evening)      insulin syringe-needle U-100 (BD INSULIN SYRINGE) 1 mL 25 gauge x 5/8" Syrg BD Insulin Syringe 1 mL 25 gauge x 5/8"      latanoprost 0.005 % ophthalmic solution latanoprost 0.005 % eye drops      leuprolide (LUPRON) 30 mg injection Inject 30 mg into the muscle every 6 (six) months.      multivitamin (THERAGRAN) per tablet Take 1 tablet by mouth once daily.      vitamin E 100 UNIT capsule Take 100 capsules by mouth once daily.      [DISCONTINUED] citalopram (CELEXA) 20 MG tablet Take 1 tablet (20 mg total) by mouth once daily. (Patient taking differently: Take 10 mg by mouth once daily.) 90 tablet 1    [DISCONTINUED] flash glucose sensor (FREESTYLE BRISEIDA 2 SENSOR) Kit Place one sensor on back of arm every 14 days to monitor glucose levels. 1 " kit 5    [DISCONTINUED] atorvastatin (LIPITOR) 20 MG tablet Take 1 tablet (20 mg total) by mouth once daily. (Patient not taking: Reported on 3/4/2025) 90 tablet 3    [DISCONTINUED] brimonidine 0.15 % OPTH DROP (ALPHAGAN) 0.15 % ophthalmic solution  (Patient not taking: Reported on 3/4/2025)      [DISCONTINUED] ergocalciferol (ERGOCALCIFEROL) 50,000 unit Cap 1 capsule. (Patient not taking: Reported on 3/4/2025)      [DISCONTINUED] finerenone (KERENDIA) 10 mg Tab Take 1 tablet by mouth once daily. (Patient not taking: Reported on 3/4/2025) 30 tablet 1    [DISCONTINUED] gabapentin (NEURONTIN) 300 MG capsule Take 1 capsule (300 mg total) by mouth 3 (three) times daily. (Patient not taking: Reported on 3/19/2025) 270 capsule 1     Current Facility-Administered Medications on File Prior to Visit   Medication Dose Route Frequency Provider Last Rate Last Admin    [DISCONTINUED] GENERIC EXTERNAL MEDICATION     Provider, Generic External Data

## 2025-03-20 ENCOUNTER — RESULTS FOLLOW-UP (OUTPATIENT)
Dept: FAMILY MEDICINE | Facility: CLINIC | Age: 75
End: 2025-03-20

## 2025-03-20 ENCOUNTER — TELEPHONE (OUTPATIENT)
Dept: FAMILY MEDICINE | Facility: CLINIC | Age: 75
End: 2025-03-20
Payer: MEDICARE

## 2025-03-20 DIAGNOSIS — R80.9 TYPE 2 DIABETES MELLITUS WITH MICROALBUMINURIA, WITH LONG-TERM CURRENT USE OF INSULIN: ICD-10-CM

## 2025-03-20 DIAGNOSIS — E11.29 TYPE 2 DIABETES MELLITUS WITH MICROALBUMINURIA, WITH LONG-TERM CURRENT USE OF INSULIN: ICD-10-CM

## 2025-03-20 DIAGNOSIS — Z79.4 TYPE 2 DIABETES MELLITUS WITH MICROALBUMINURIA, WITH LONG-TERM CURRENT USE OF INSULIN: ICD-10-CM

## 2025-03-20 RX ORDER — FLASH GLUCOSE SENSOR
KIT MISCELLANEOUS
Qty: 2 KIT | Refills: 5 | Status: SHIPPED | OUTPATIENT
Start: 2025-03-20

## 2025-03-20 NOTE — TELEPHONE ENCOUNTER
----- Message from Oc Song MD sent at 3/20/2025  7:59 AM CDT -----  Patient has a marked improvement in his A1c now 8.2 and very close to goal.  Renal function is markedly improved.  ----- Message -----  From: Lab, Background User  Sent: 3/19/2025   8:18 PM CDT  To: Oc Song MD

## 2025-03-31 ENCOUNTER — EXTERNAL CHRONIC CARE MANAGEMENT (OUTPATIENT)
Dept: FAMILY MEDICINE | Facility: CLINIC | Age: 75
End: 2025-03-31
Payer: MEDICARE

## 2025-03-31 PROCEDURE — G0511 CCM/BHI BY RHC/FQHC 20MIN MO: HCPCS | Mod: ,,, | Performed by: FAMILY MEDICINE

## 2025-04-02 ENCOUNTER — OFFICE VISIT (OUTPATIENT)
Dept: FAMILY MEDICINE | Facility: CLINIC | Age: 75
End: 2025-04-02
Payer: MEDICARE

## 2025-04-02 VITALS
HEIGHT: 72 IN | DIASTOLIC BLOOD PRESSURE: 62 MMHG | RESPIRATION RATE: 18 BRPM | HEART RATE: 67 BPM | WEIGHT: 243 LBS | OXYGEN SATURATION: 97 % | SYSTOLIC BLOOD PRESSURE: 98 MMHG | BODY MASS INDEX: 32.91 KG/M2 | TEMPERATURE: 98 F

## 2025-04-02 DIAGNOSIS — E11.29 TYPE 2 DIABETES MELLITUS WITH MICROALBUMINURIA, WITH LONG-TERM CURRENT USE OF INSULIN: Primary | ICD-10-CM

## 2025-04-02 DIAGNOSIS — R80.9 TYPE 2 DIABETES MELLITUS WITH MICROALBUMINURIA, WITH LONG-TERM CURRENT USE OF INSULIN: Primary | ICD-10-CM

## 2025-04-02 DIAGNOSIS — Z79.4 TYPE 2 DIABETES MELLITUS WITH MICROALBUMINURIA, WITH LONG-TERM CURRENT USE OF INSULIN: Primary | ICD-10-CM

## 2025-04-02 PROCEDURE — 99212 OFFICE O/P EST SF 10 MIN: CPT | Mod: ,,, | Performed by: FAMILY MEDICINE

## 2025-04-02 PROCEDURE — 3288F FALL RISK ASSESSMENT DOCD: CPT | Mod: ,,, | Performed by: FAMILY MEDICINE

## 2025-04-02 PROCEDURE — 1126F AMNT PAIN NOTED NONE PRSNT: CPT | Mod: ,,, | Performed by: FAMILY MEDICINE

## 2025-04-02 PROCEDURE — 1159F MED LIST DOCD IN RCRD: CPT | Mod: ,,, | Performed by: FAMILY MEDICINE

## 2025-04-02 PROCEDURE — 3074F SYST BP LT 130 MM HG: CPT | Mod: ,,, | Performed by: FAMILY MEDICINE

## 2025-04-02 PROCEDURE — 1160F RVW MEDS BY RX/DR IN RCRD: CPT | Mod: ,,, | Performed by: FAMILY MEDICINE

## 2025-04-02 PROCEDURE — 1101F PT FALLS ASSESS-DOCD LE1/YR: CPT | Mod: ,,, | Performed by: FAMILY MEDICINE

## 2025-04-02 PROCEDURE — 3078F DIAST BP <80 MM HG: CPT | Mod: ,,, | Performed by: FAMILY MEDICINE

## 2025-04-02 NOTE — PROGRESS NOTES
Davon Barrios is a 74 y.o. male seen today for follow-up on his depression and his diabetes.  With his change in his dosing his blood sugars have improved and he has had very few low blood sugars.  We will continue current regimen for now and plan on rechecking his A1c which was mildly elevated in 3 months.  His depression has not improved but he has only been on the Celexa for 2 weeks he remains confused and we had a long talk about using his CPAP nightly and having a vitamin-D level done.     Past Medical History:   Diagnosis Date    Carcinoma of prostate     History of kidney cancer     removed right kidney 2021    Hypertension     Sleep apnea     Type 2 diabetes mellitus     Vitamin B 12 deficiency(non anemic)      Family History   Problem Relation Name Age of Onset    Diabetes Mother      Rectal cancer Father      Diabetes Father      Hypertension Father      Lung cancer Father      Hypertension Sister      Hypertension Brother       Medications Ordered Prior to Encounter[1]  Immunization History   Administered Date(s) Administered    COVID-19, MRNA, LN-S, PF (MODERNA FULL 0.5 ML DOSE) 03/05/2021, 04/06/2021, 11/19/2021    Influenza - Quadrivalent - PF *Preferred* (6 months and older) 09/16/2021    Influenza - Trivalent - Fluad - Adjuvanted - PF (65 years and older 10/21/2024    Influenza - Trivalent - Fluzone High Dose - PF (65 years and older) 03/01/2021    Influenza Split 01/01/2015    Pneumococcal Conjugate - 13 Valent 03/01/2021    Pneumococcal Conjugate - 20 Valent 11/20/2024    Pneumococcal Polysaccharide - 23 Valent 09/03/2014       Review of Systems   Constitutional:  Negative for fever, malaise/fatigue and weight loss.   Respiratory:  Negative for shortness of breath.    Cardiovascular:  Negative for chest pain and palpitations.   Gastrointestinal:  Negative for nausea and vomiting.   Psychiatric/Behavioral:  Positive for depression and memory loss.         Vitals:    04/02/25 1009   BP: 98/62    Pulse: 67   Resp: 18   Temp: 97.7 °F (36.5 °C)       Physical Exam  Vitals reviewed.   Pulmonary:      Effort: Pulmonary effort is normal.   Neurological:      Mental Status: He is alert.   Psychiatric:         Mood and Affect: Mood normal.         Behavior: Behavior normal.         Thought Content: Thought content normal.         Judgment: Judgment normal.          Assessment and Plan  1. Type 2 diabetes mellitus with microalbuminuria, with long-term current use of insulin             Return to clinic in 2 weeks for follow-up on his depression and confusion.    Health Maintenance Topics with due status: Not Due       Topic Last Completion Date    Diabetes Urine Screening 10/21/2024    Lipid Panel 10/21/2024    Hemoglobin A1c 03/19/2025              [1]   Current Outpatient Medications on File Prior to Visit   Medication Sig Dispense Refill    ADULT LOW DOSE ASPIRIN ORAL Take 81 mg by mouth once daily.      ascorbic Acid (VITAMIN C) 500 mg CpSR Take 500 mg by mouth once daily.      atorvastatin (LIPITOR) 20 MG tablet Take 1 tablet (20 mg total) by mouth once daily. 90 tablet 3    carvediloL (COREG) 3.125 MG tablet Take 1 tablet (3.125 mg total) by mouth 2 (two) times daily. 180 tablet 1    citalopram (CELEXA) 10 MG tablet Take 1 tablet (10 mg total) by mouth once daily. 90 tablet 1    dapagliflozin propanediol (FARXIGA) 10 mg tablet Take 1 tablet (10 mg total) by mouth once daily. 90 tablet 1    flash glucose scanning reader (FREESTYLE BRISEIDA 2 READER) [facility] Use to monitor glucose 1 each 1    flash glucose sensor (FREESTYLE BRISEIDA 2 SENSOR) Kit Place one sensor on back of arm every 14 days to monitor glucose levels. 2 kit 5    insulin NPH-insulin regular, 70/30, (NOVOLIN 70/30) 100 unit/mL (70-30) injection Inject 40 units subcutaneously every morning and inject 32 units subcutaneously every evening (Patient taking differently: Inject 40 units subcutaneously every morning and inject 20 units subcutaneously every  "evening)      insulin syringe-needle U-100 (BD INSULIN SYRINGE) 1 mL 25 gauge x 5/8" Syrg BD Insulin Syringe 1 mL 25 gauge x 5/8"      latanoprost 0.005 % ophthalmic solution latanoprost 0.005 % eye drops      leuprolide (LUPRON) 30 mg injection Inject 30 mg into the muscle every 6 (six) months.      multivitamin (THERAGRAN) per tablet Take 1 tablet by mouth once daily.      vitamin E 100 UNIT capsule Take 100 capsules by mouth once daily.      enalapril (VASOTEC) 20 MG tablet Take 1 tablet (20 mg total) by mouth once daily. as directed (Patient not taking: Reported on 4/2/2025) 90 tablet 1    furosemide (LASIX) 40 MG tablet Take 20 mg by mouth once daily. (Patient not taking: Reported on 4/2/2025)       No current facility-administered medications on file prior to visit.     "

## 2025-04-15 ENCOUNTER — OFFICE VISIT (OUTPATIENT)
Dept: FAMILY MEDICINE | Facility: CLINIC | Age: 75
End: 2025-04-15
Payer: MEDICARE

## 2025-04-15 VITALS
HEIGHT: 72 IN | TEMPERATURE: 98 F | SYSTOLIC BLOOD PRESSURE: 124 MMHG | HEART RATE: 66 BPM | DIASTOLIC BLOOD PRESSURE: 70 MMHG | BODY MASS INDEX: 33.05 KG/M2 | OXYGEN SATURATION: 96 % | RESPIRATION RATE: 18 BRPM | WEIGHT: 244 LBS

## 2025-04-15 DIAGNOSIS — F32.A DEPRESSION, UNSPECIFIED DEPRESSION TYPE: Primary | ICD-10-CM

## 2025-04-15 PROCEDURE — 1159F MED LIST DOCD IN RCRD: CPT | Mod: ,,, | Performed by: FAMILY MEDICINE

## 2025-04-15 PROCEDURE — 1126F AMNT PAIN NOTED NONE PRSNT: CPT | Mod: ,,, | Performed by: FAMILY MEDICINE

## 2025-04-15 PROCEDURE — 99213 OFFICE O/P EST LOW 20 MIN: CPT | Mod: ,,, | Performed by: FAMILY MEDICINE

## 2025-04-15 PROCEDURE — 3078F DIAST BP <80 MM HG: CPT | Mod: ,,, | Performed by: FAMILY MEDICINE

## 2025-04-15 PROCEDURE — 3288F FALL RISK ASSESSMENT DOCD: CPT | Mod: ,,, | Performed by: FAMILY MEDICINE

## 2025-04-15 PROCEDURE — 1101F PT FALLS ASSESS-DOCD LE1/YR: CPT | Mod: ,,, | Performed by: FAMILY MEDICINE

## 2025-04-15 PROCEDURE — 1160F RVW MEDS BY RX/DR IN RCRD: CPT | Mod: ,,, | Performed by: FAMILY MEDICINE

## 2025-04-15 PROCEDURE — 3074F SYST BP LT 130 MM HG: CPT | Mod: ,,, | Performed by: FAMILY MEDICINE

## 2025-04-15 RX ORDER — CITALOPRAM 20 MG/1
20 TABLET, FILM COATED ORAL DAILY
Qty: 90 TABLET | Refills: 1 | Status: SHIPPED | OUTPATIENT
Start: 2025-04-15 | End: 2026-04-15

## 2025-04-15 NOTE — PROGRESS NOTES
Davon Barrios is a 74 y.o. male seen today for follow-up on his depression.  Patient reports he has tolerated the Celexa well and has had some moderate improvement and we discussed increasing his dose.  He still has confused but only uses CPAP for 1 night saying that instead his set up is unclean in his wife is promising to clean that for him so we can use it more frequently.     Past Medical History:   Diagnosis Date    Carcinoma of prostate     History of kidney cancer     removed right kidney 2021    Hypertension     Sleep apnea     Type 2 diabetes mellitus     Vitamin B 12 deficiency(non anemic)      Family History   Problem Relation Name Age of Onset    Diabetes Mother      Rectal cancer Father      Diabetes Father      Hypertension Father      Lung cancer Father      Hypertension Sister      Hypertension Brother       Medications Ordered Prior to Encounter[1]  Immunization History   Administered Date(s) Administered    COVID-19, MRNA, LN-S, PF (MODERNA FULL 0.5 ML DOSE) 03/05/2021, 04/06/2021, 11/19/2021    Influenza - Quadrivalent - PF *Preferred* (6 months and older) 09/16/2021    Influenza - Trivalent - Fluad - Adjuvanted - PF (65 years and older 10/21/2024    Influenza - Trivalent - Fluzone High Dose - PF (65 years and older) 03/01/2021    Influenza Split 01/01/2015    Pneumococcal Conjugate - 13 Valent 03/01/2021    Pneumococcal Conjugate - 20 Valent 11/20/2024    Pneumococcal Polysaccharide - 23 Valent 09/03/2014       Review of Systems   Constitutional:  Negative for fever, malaise/fatigue and weight loss.   Respiratory:  Negative for shortness of breath.    Cardiovascular:  Negative for chest pain and palpitations.   Gastrointestinal:  Negative for nausea and vomiting.   Psychiatric/Behavioral:  Positive for memory loss. Negative for depression.         Vitals:    04/15/25 1357   BP: 124/70   Pulse: 66   Resp: 18   Temp: 97.5 °F (36.4 °C)       Physical Exam  Vitals reviewed.   Eyes:       Conjunctiva/sclera: Conjunctivae normal.   Pulmonary:      Effort: Pulmonary effort is normal.   Neurological:      Mental Status: He is alert.   Psychiatric:         Mood and Affect: Mood normal.         Behavior: Behavior normal.          Assessment and Plan  1. Depression, unspecified depression type  -     citalopram (CELEXA) 20 MG tablet; Take 1 tablet (20 mg total) by mouth once daily.  Dispense: 90 tablet; Refill: 1             Return to clinic in 2-3 months or as needed.  In the interim he will follow-up with neurology for his memory issues and hopefully will improve with his improve sleep quality.    Health Maintenance Topics with due status: Not Due       Topic Last Completion Date    Diabetes Urine Screening 10/21/2024    Lipid Panel 10/21/2024    Hemoglobin A1c 03/19/2025              [1]   Current Outpatient Medications on File Prior to Visit   Medication Sig Dispense Refill    ADULT LOW DOSE ASPIRIN ORAL Take 81 mg by mouth once daily.      ascorbic Acid (VITAMIN C) 500 mg CpSR Take 500 mg by mouth once daily.      atorvastatin (LIPITOR) 20 MG tablet Take 1 tablet (20 mg total) by mouth once daily. 90 tablet 3    carvediloL (COREG) 3.125 MG tablet Take 1 tablet (3.125 mg total) by mouth 2 (two) times daily. 180 tablet 1    dapagliflozin propanediol (FARXIGA) 10 mg tablet Take 1 tablet (10 mg total) by mouth once daily. 90 tablet 1    flash glucose scanning reader (FREESTYLE BRISEIDA 2 READER) [facility] Use to monitor glucose 1 each 1    flash glucose sensor (FREESTYLE BRISEIDA 2 SENSOR) Kit Place one sensor on back of arm every 14 days to monitor glucose levels. 2 kit 5    insulin NPH-insulin regular, 70/30, (NOVOLIN 70/30) 100 unit/mL (70-30) injection Inject 40 units subcutaneously every morning and inject 32 units subcutaneously every evening (Patient taking differently: Inject 40 units subcutaneously every morning and inject 20 units subcutaneously every evening)      insulin syringe-needle U-100 (BD INSULIN  "SYRINGE) 1 mL 25 gauge x 5/8" Syrg BD Insulin Syringe 1 mL 25 gauge x 5/8"      latanoprost 0.005 % ophthalmic solution latanoprost 0.005 % eye drops      leuprolide (LUPRON) 30 mg injection Inject 30 mg into the muscle every 6 (six) months.      multivitamin (THERAGRAN) per tablet Take 1 tablet by mouth once daily.      vitamin E 100 UNIT capsule Take 100 capsules by mouth once daily.      [DISCONTINUED] citalopram (CELEXA) 10 MG tablet Take 1 tablet (10 mg total) by mouth once daily. 90 tablet 1     No current facility-administered medications on file prior to visit.     "

## 2025-04-30 ENCOUNTER — EXTERNAL CHRONIC CARE MANAGEMENT (OUTPATIENT)
Dept: FAMILY MEDICINE | Facility: CLINIC | Age: 75
End: 2025-04-30
Payer: MEDICARE

## 2025-04-30 PROCEDURE — 99490 CHRNC CARE MGMT STAFF 1ST 20: CPT | Mod: ,,, | Performed by: FAMILY MEDICINE

## 2025-05-07 ENCOUNTER — PATIENT MESSAGE (OUTPATIENT)
Dept: NEUROLOGY | Facility: CLINIC | Age: 75
End: 2025-05-07
Payer: MEDICARE

## 2025-05-08 ENCOUNTER — OFFICE VISIT (OUTPATIENT)
Dept: NEUROLOGY | Facility: CLINIC | Age: 75
End: 2025-05-08
Payer: MEDICARE

## 2025-05-08 VITALS
OXYGEN SATURATION: 96 % | SYSTOLIC BLOOD PRESSURE: 139 MMHG | HEART RATE: 70 BPM | DIASTOLIC BLOOD PRESSURE: 69 MMHG | HEIGHT: 72 IN | RESPIRATION RATE: 18 BRPM | BODY MASS INDEX: 32.75 KG/M2 | WEIGHT: 241.81 LBS

## 2025-05-08 DIAGNOSIS — G30.1 MODERATE LATE ONSET ALZHEIMER'S DEMENTIA WITH AGITATION: Primary | ICD-10-CM

## 2025-05-08 DIAGNOSIS — R41.3 OTHER AMNESIA: ICD-10-CM

## 2025-05-08 DIAGNOSIS — E03.9 HYPOTHYROIDISM, UNSPECIFIED TYPE: ICD-10-CM

## 2025-05-08 DIAGNOSIS — E55.9 VITAMIN D DEFICIENCY: ICD-10-CM

## 2025-05-08 DIAGNOSIS — F02.B11 MODERATE LATE ONSET ALZHEIMER'S DEMENTIA WITH AGITATION: Primary | ICD-10-CM

## 2025-05-08 DIAGNOSIS — E53.8 VITAMIN B12 DEFICIENCY: ICD-10-CM

## 2025-05-08 PROCEDURE — 1160F RVW MEDS BY RX/DR IN RCRD: CPT | Mod: CPTII,,, | Performed by: NURSE PRACTITIONER

## 2025-05-08 PROCEDURE — 3075F SYST BP GE 130 - 139MM HG: CPT | Mod: CPTII,,, | Performed by: NURSE PRACTITIONER

## 2025-05-08 PROCEDURE — 99999 PR PBB SHADOW E&M-EST. PATIENT-LVL V: CPT | Mod: PBBFAC,,, | Performed by: NURSE PRACTITIONER

## 2025-05-08 PROCEDURE — 1159F MED LIST DOCD IN RCRD: CPT | Mod: CPTII,,, | Performed by: NURSE PRACTITIONER

## 2025-05-08 PROCEDURE — 99204 OFFICE O/P NEW MOD 45 MIN: CPT | Mod: S$PBB,,, | Performed by: NURSE PRACTITIONER

## 2025-05-08 PROCEDURE — 1101F PT FALLS ASSESS-DOCD LE1/YR: CPT | Mod: CPTII,,, | Performed by: NURSE PRACTITIONER

## 2025-05-08 PROCEDURE — 1126F AMNT PAIN NOTED NONE PRSNT: CPT | Mod: CPTII,,, | Performed by: NURSE PRACTITIONER

## 2025-05-08 PROCEDURE — 99215 OFFICE O/P EST HI 40 MIN: CPT | Mod: PBBFAC | Performed by: NURSE PRACTITIONER

## 2025-05-08 PROCEDURE — 3288F FALL RISK ASSESSMENT DOCD: CPT | Mod: CPTII,,, | Performed by: NURSE PRACTITIONER

## 2025-05-08 PROCEDURE — 3078F DIAST BP <80 MM HG: CPT | Mod: CPTII,,, | Performed by: NURSE PRACTITIONER

## 2025-05-08 RX ORDER — RIVASTIGMINE TARTRATE 1.5 MG/1
1.5 CAPSULE ORAL 2 TIMES DAILY
Qty: 60 CAPSULE | Refills: 11 | Status: SHIPPED | OUTPATIENT
Start: 2025-05-08 | End: 2026-05-08

## 2025-05-08 NOTE — PROGRESS NOTES
Subjective:       Patient ID: Davon Barrios is a 74 y.o. male     Chief Complaint:  No chief complaint on file.       Allergies:  Patient has no known allergies.    Current Medications:  Encounter Medications[1]    History of Present Illness  75 y/o male, new to neurology, there were no associated referrals for this visit, no reason in the EMR for the visit    He and wife report he is here for symptoms of confusion.  He had this onset per the wife that began on 2/20/25, wife states he began seeming confused on the way home from [place] after seeing eye doctor about vision changes.  He has seemed to have more complications with confusion since that time and mood.  Sometimes even confusing who she is, staying on task, fixating on things that typically would not of bothered him and seems like he is having more agitation.  They were intending to get MRI brain from [place] but this has been hampered by distance.  Wife concerned if he had a stroke.  Prior seen by Dr. Rice for memory and was placed on aricept but caused severe nightmares.    Has underlying sleep apnea but per primary care notes from 4/2 and 4/15, he is not using C-pap as directed.  They are treating depression/agitation with celexa 20mg daily.  I do note in the EMR prior MRI brain done in 2020 at [facility] reported indicated for confusion, no indication of CVA at that time.  More recent CT head done in February of 2025 at [facility] did not report any CVA, though reported indications of chronic microvascular changes.  It ws done due to fall, but concern is if CVA missed on CT head.    MMSE here in clinic 24/30    Given symptoms certainly dementia is in the differential as well.  Will obtain MMSE here in clinic, setup MRI and obtain dementia panel labs.  Will start with exelon but 1.5mg dosing first given prior problems with aricept.             Review of Systems  Review of Systems   Constitutional:  Negative for diaphoresis and fever.   HENT:  Negative for  congestion, hearing loss and tinnitus.    Eyes:  Negative for blurred vision, double vision, photophobia, discharge and redness.   Respiratory:  Negative for cough and shortness of breath.    Cardiovascular:  Negative for chest pain.   Gastrointestinal:  Negative for abdominal pain, nausea and vomiting.   Musculoskeletal:  Negative for back pain, joint pain, myalgias and neck pain.   Skin:  Negative for itching and rash.   Neurological:  Negative for dizziness, tremors, sensory change, speech change, focal weakness, seizures, loss of consciousness, weakness and headaches.   Psychiatric/Behavioral:  Positive for memory loss. Negative for depression and hallucinations. The patient does not have insomnia.    All other systems reviewed and are negative.     Objective:     NEUROLOGICAL EXAMINATION:     MENTAL STATUS   Oriented to person, place, and time.   Recall at 5 minutes: recalls 2 of 3 objects.   Attention: normal. Concentration: decreased.   Speech: speech is normal   Level of consciousness: alert  Knowledge: good and consistent with education.   Normal comprehension.     CRANIAL NERVES     CN II   Visual fields full to confrontation.   Visual acuity: normal  Right visual field deficit: none  Left visual field deficit: none     CN III, IV, VI   Pupils are equal, round, and reactive to light.  Extraocular motions are normal.   Right pupil: Size: 3 mm. Shape: regular. Reactivity: brisk. Consensual response: intact. Accommodation: intact.   Left pupil: Size: 3 mm. Shape: regular. Reactivity: brisk. Consensual response: intact. Accommodation: intact.   CN III: no CN III palsy  CN VI: no CN VI palsy  Nystagmus: none   Diplopia: none  Upgaze: normal  Downgaze: normal  Conjugate gaze: present  Vestibulo-ocular reflex: present    CN V   Facial sensation intact.   Right facial sensation deficit: none  Left facial sensation deficit: none  Right corneal reflex: normal  Left corneal reflex: normal    CN VII   Facial  expression full, symmetric.   Right facial weakness: none  Left facial weakness: none  Right taste: normal  Left taste: normal    CN VIII   CN VIII normal.   Hearing: intact    CN IX, X   CN IX normal.   CN X normal.   Palate: symmetric    CN XI   CN XI normal.   Right sternocleidomastoid strength: normal  Left sternocleidomastoid strength: normal  Right trapezius strength: normal  Left trapezius strength: normal    CN XII   CN XII normal.   Tongue: not atrophic  Fasciculations: absent  Tongue deviation: none    MOTOR EXAM   Muscle bulk: normal  Overall muscle tone: normal  Right arm tone: normal  Left arm tone: normal  Right arm pronator drift: absent  Left arm pronator drift: absent  Right leg tone: normal  Left leg tone: normal    Strength   Right neck flexion: 5/5  Left neck flexion: 5/5  Right neck extension: 5/5  Left neck extension: 5/5  Right deltoid: 5/5  Left deltoid: 5/5  Right biceps: 5/5  Left biceps: 5/5  Right triceps: 5/5  Left triceps: 5/5  Right wrist flexion: 5/5  Left wrist flexion: 5/5  Right wrist extension: 5/5  Left wrist extension: 5/5  Right interossei: 5/5  Left interossei: 5/5  Right iliopsoas: 5/5  Left iliopsoas: 5/5  Right quadriceps: 5/5  Left quadriceps: 5/5  Right hamstrin/5  Left hamstrin/5  Right anterior tibial: 5/5  Left anterior tibial: 5/5  Right posterior tibial: 5/5  Left posterior tibial: 5/5  Right gastroc: 5/5  Left gastroc: 5/5    REFLEXES     Reflexes   Right brachioradialis: 2+  Left brachioradialis: 2+  Right biceps: 2+  Left biceps: 2+  Right triceps: 2+  Left triceps: 2+  Right patellar: 2+  Left patellar: 2+  Right achilles: 2+  Left achilles: 2+  Right plantar: normal  Left plantar: normal  Right Beltre: absent  Left Beltre: absent  Right ankle clonus: absent  Left ankle clonus: absent  Right pendular knee jerk: absent  Left pendular knee jerk: absent    SENSORY EXAM   Light touch normal.   Right arm light touch: normal  Left arm light touch:  normal  Right leg light touch: normal  Left leg light touch: normal  Vibration normal.   Right arm vibration: normal  Left arm vibration: normal  Right leg vibration: normal  Left leg vibration: normal  Proprioception normal.   Right arm proprioception: normal  Left arm proprioception: normal  Right leg proprioception: normal  Left leg proprioception: normal  Pinprick normal.   Right arm pinprick: normal  Left arm pinprick: normal  Right leg pinprick: normal  Left leg pinprick: normal  Graphesthesia: normal  Romberg: negative  Stereognosis: normal    GAIT AND COORDINATION      Coordination   Finger to nose coordination: normal  Heel to shin coordination: normal  Tandem walking coordination: abnormal    Tremor   Resting tremor: absent  Intention tremor: absent  Action tremor: absent       Using wheelchair for ambulation in clinic        Physical Exam  Vitals and nursing note reviewed.   Constitutional:       Appearance: Normal appearance.   HENT:      Head: Normocephalic.   Eyes:      Extraocular Movements: EOM normal.      Pupils: Pupils are equal, round, and reactive to light.   Cardiovascular:      Rate and Rhythm: Normal rate.   Pulmonary:      Effort: Pulmonary effort is normal.   Musculoskeletal:         General: Normal range of motion.      Cervical back: Normal range of motion and neck supple.   Skin:     General: Skin is warm and dry.   Neurological:      General: No focal deficit present.      Mental Status: He is alert and oriented to person, place, and time.      Cranial Nerves: No cranial nerve deficit.      Sensory: No sensory deficit.      Motor: No weakness.      Coordination: Coordination normal. Finger-Nose-Finger Test, Heel to Shin Test and Romberg Test normal.      Gait: Tandem walk abnormal. Gait normal.      Deep Tendon Reflexes: Reflexes normal.      Reflex Scores:       Tricep reflexes are 2+ on the right side and 2+ on the left side.       Bicep reflexes are 2+ on the right side and 2+ on the  left side.       Brachioradialis reflexes are 2+ on the right side and 2+ on the left side.       Patellar reflexes are 2+ on the right side and 2+ on the left side.       Achilles reflexes are 2+ on the right side and 2+ on the left side.  Psychiatric:         Mood and Affect: Mood normal.         Speech: Speech normal.         Behavior: Behavior normal. Behavior is cooperative.         Cognition and Memory: Cognition is impaired. Memory is impaired.          Assessment:     Problem List Items Addressed This Visit    None  Visit Diagnoses         Moderate late onset Alzheimer's dementia with agitation    -  Primary    Relevant Medications    rivastigmine tartrate (EXELON) 1.5 MG capsule    Other Relevant Orders    Ammonia    CBC Auto Differential    Comprehensive Metabolic Panel      Other amnesia        Relevant Orders    MRI Brain Without Contrast    Syphilis Antibody with reflex to RPR      Vitamin B12 deficiency        Relevant Orders    Folate    Vitamin B12      Vitamin D deficiency        Relevant Orders    Vitamin D      Hypothyroidism, unspecified type        Relevant Orders    TSH             Primary Diagnosis and ICD10  Moderate late onset Alzheimer's dementia with agitation [G30.1, F02.B11]    Plan:     There are no Patient Instructions on file for this visit.    There are no discontinued medications.    Requested Prescriptions     Signed Prescriptions Disp Refills    rivastigmine tartrate (EXELON) 1.5 MG capsule 60 capsule 11     Sig: Take 1 capsule (1.5 mg total) by mouth 2 (two) times daily.       Orders Placed This Encounter   Procedures    MRI Brain Without Contrast    Ammonia    CBC Auto Differential    Comprehensive Metabolic Panel    Folate    Vitamin D    Vitamin B12    TSH    Syphilis Antibody with reflex to RPR            [1]   Outpatient Encounter Medications as of 5/8/2025   Medication Sig Dispense Refill    ADULT LOW DOSE ASPIRIN ORAL Take 81 mg by mouth once daily.      ascorbic Acid  "(VITAMIN C) 500 mg CpSR Take 500 mg by mouth once daily.      atorvastatin (LIPITOR) 20 MG tablet Take 1 tablet (20 mg total) by mouth once daily. 90 tablet 3    carvediloL (COREG) 3.125 MG tablet Take 1 tablet (3.125 mg total) by mouth 2 (two) times daily. 180 tablet 1    citalopram (CELEXA) 20 MG tablet Take 1 tablet (20 mg total) by mouth once daily. 90 tablet 1    dapagliflozin propanediol (FARXIGA) 10 mg tablet Take 1 tablet (10 mg total) by mouth once daily. 90 tablet 1    flash glucose scanning reader (FREESTYLE BRISEIDA 2 READER) [facility] [place] to monitor glucose 1 each 1    flash glucose sensor (FREESTYLE BRISEIDA 2 SENSOR) Kit Place one sensor on back of arm every 14 days to monitor glucose levels. 2 kit 5    insulin NPH-insulin regular, 70/30, (NOVOLIN 70/30) 100 unit/mL (70-30) injection Inject 40 units subcutaneously every morning and inject 32 units subcutaneously every evening (Patient taking differently: Inject 40 units subcutaneously every morning and inject 20 units subcutaneously every evening)      insulin syringe-needle U-100 (BD INSULIN SYRINGE) 1 mL 25 gauge x 5/8" Syrg BD Insulin Syringe 1 mL 25 gauge x 5/8"      latanoprost 0.005 % ophthalmic solution latanoprost 0.005 % eye drops      leuprolide (LUPRON) 30 mg injection Inject 30 mg into the muscle every 6 (six) months.      multivitamin (THERAGRAN) per tablet Take 1 tablet by mouth once daily.      vitamin E 100 UNIT capsule Take 100 capsules by mouth once daily.      rivastigmine tartrate (EXELON) 1.5 MG capsule Take 1 capsule (1.5 mg total) by mouth 2 (two) times daily. 60 capsule 11    [DISCONTINUED] citalopram (CELEXA) 10 MG tablet Take 1 tablet (10 mg total) by mouth once daily. 90 tablet 1     No facility-administered encounter medications on file as of 5/8/2025.     "

## 2025-05-09 ENCOUNTER — RESULTS FOLLOW-UP (OUTPATIENT)
Dept: FAMILY MEDICINE | Facility: CLINIC | Age: 75
End: 2025-05-09
Payer: MEDICARE

## 2025-05-09 ENCOUNTER — TELEPHONE (OUTPATIENT)
Dept: NEUROLOGY | Facility: CLINIC | Age: 75
End: 2025-05-09
Payer: MEDICARE

## 2025-05-09 NOTE — TELEPHONE ENCOUNTER
Left VM.    ----- Message from ISADORA Pace sent at 5/9/2025 10:13 AM CDT -----  No significant abnormalities noted on the labs  ----- Message -----  From: Lab, Background User  Sent: 5/8/2025   3:43 PM CDT  To: ISADORA Peterson

## 2025-05-12 ENCOUNTER — TELEPHONE (OUTPATIENT)
Dept: FAMILY MEDICINE | Facility: CLINIC | Age: 75
End: 2025-05-12
Payer: MEDICARE

## 2025-05-12 NOTE — TELEPHONE ENCOUNTER
----- Message from Leticia sent at 5/9/2025  8:11 AM CDT -----  Regarding: Missed call  Contact: Shaneka  [phone] Shaneka called  back and said she missed a call but could not remember what the message said as to who the call was for, if for her or him. Please call back.

## 2025-05-28 ENCOUNTER — TELEPHONE (OUTPATIENT)
Dept: NEUROLOGY | Facility: CLINIC | Age: 75
End: 2025-05-28
Payer: MEDICARE

## 2025-05-28 NOTE — TELEPHONE ENCOUNTER
No answer.  Left message.      ----- Message from ISADORA Pace sent at 5/28/2025  7:19 AM CDT -----  No acute findings on the MRI brain, did reflect general atrophy however, which can contribute with memory impairment  ----- Message -----  From: Meghan Rad Results In  Sent: 5/27/2025   5:15 PM CDT  To: ISADORA Peterson

## 2025-05-29 ENCOUNTER — TELEPHONE (OUTPATIENT)
Dept: NEUROLOGY | Facility: CLINIC | Age: 75
End: 2025-05-29
Payer: MEDICARE

## 2025-05-29 NOTE — TELEPHONE ENCOUNTER
Copied from CRM #[id number]. Topic: General Inquiry - Test Results  >> May 29, 2025  8:04 AM Suzanne wrote:  Who Called: ENRIQUE GIVENS, WIFE    Caller is requesting information on test results.    Name of Test (Lab/Mammo/Etc): MRI  Date of Test: 05-27  Where the test was performed: CLINIC  Ordering Provider: TAURUS ELIAS    Preferred Method of Contact: Phone Call  Patient's Preferred Phone Number on File: [phone]   Best Call Back Number, if different:  Additional Information:

## 2025-05-29 NOTE — TELEPHONE ENCOUNTER
Copied from CRM #[id number]. Topic: General Inquiry - Patient Advice  >> May 29, 2025  8:44 AM Yuko wrote:  Who Called: Shaneka (wife)    Patient is returning phone call    Who Left Message for Patient: rupa  Does the patient know what this is regarding?: test results      Preferred Method of Contact: Phone Call  Best Call Back Number, if different:[phone]

## 2025-05-29 NOTE — TELEPHONE ENCOUNTER
Copied from CRM #[id number]. Topic: General Inquiry - Return Call  >> May 28, 2025  5:00 PM Tommy wrote:  Who Called: Davon Barrios      Does the patient know what this is regarding?:pt missed call and called back to talk to nurse      Preferred Method of Contact: Phone Call  Patient's Preferred Phone Number on File: [phone]

## 2025-05-31 ENCOUNTER — EXTERNAL CHRONIC CARE MANAGEMENT (OUTPATIENT)
Dept: FAMILY MEDICINE | Facility: CLINIC | Age: 75
End: 2025-05-31
Payer: MEDICARE

## 2025-05-31 PROCEDURE — 99490 CHRNC CARE MGMT STAFF 1ST 20: CPT | Mod: ,,, | Performed by: FAMILY MEDICINE

## 2025-06-11 DIAGNOSIS — Z79.4 TYPE 2 DIABETES MELLITUS WITH MICROALBUMINURIA, WITH LONG-TERM CURRENT USE OF INSULIN: ICD-10-CM

## 2025-06-11 DIAGNOSIS — R80.9 TYPE 2 DIABETES MELLITUS WITH MICROALBUMINURIA, WITH LONG-TERM CURRENT USE OF INSULIN: ICD-10-CM

## 2025-06-11 DIAGNOSIS — E11.29 TYPE 2 DIABETES MELLITUS WITH MICROALBUMINURIA, WITH LONG-TERM CURRENT USE OF INSULIN: ICD-10-CM

## 2025-06-11 DIAGNOSIS — I10 HYPERTENSION, UNSPECIFIED TYPE: ICD-10-CM

## 2025-06-11 RX ORDER — DAPAGLIFLOZIN 10 MG/1
10 TABLET, FILM COATED ORAL DAILY
Qty: 90 TABLET | Refills: 1 | Status: SHIPPED | OUTPATIENT
Start: 2025-06-11

## 2025-06-11 RX ORDER — CARVEDILOL 3.12 MG/1
3.12 TABLET ORAL 2 TIMES DAILY
Qty: 180 TABLET | Refills: 1 | Status: SHIPPED | OUTPATIENT
Start: 2025-06-11

## 2025-06-11 NOTE — TELEPHONE ENCOUNTER
----- Message from Sherine sent at 6/11/2025 10:09 AM CDT -----  Regarding: Pt Question  Pt's wife, Shaneka, called stating that she forgot about pt's appt today and that they will not be able to make it, she wanted to reschedule, but Dr Song's next opening is not until July 22nd and Shaneka states she's having surgery on July 21st. She asks if pt can still get his Farxiga because he is completely out.

## 2025-06-30 ENCOUNTER — EXTERNAL CHRONIC CARE MANAGEMENT (OUTPATIENT)
Dept: FAMILY MEDICINE | Facility: CLINIC | Age: 75
End: 2025-06-30
Payer: MEDICARE

## 2025-06-30 PROCEDURE — 99490 CHRNC CARE MGMT STAFF 1ST 20: CPT | Mod: ,,, | Performed by: FAMILY MEDICINE

## 2025-07-09 ENCOUNTER — DOCUMENTATION ONLY (OUTPATIENT)
Dept: FAMILY MEDICINE | Facility: CLINIC | Age: 75
End: 2025-07-09
Payer: MEDICARE

## 2025-07-09 NOTE — PROGRESS NOTES
Pt wife, Shaneka, presented in clinic today with request for Farxiga 10mg samples. Dr Song agreed to give patient the remaining samples of farxiga 10mg today.       4 boxes with lot # [id number] and ex 2/28/27   2 boxes with lot # [id number] and ex 2/28/27

## 2025-07-31 ENCOUNTER — EXTERNAL CHRONIC CARE MANAGEMENT (OUTPATIENT)
Dept: FAMILY MEDICINE | Facility: CLINIC | Age: 75
End: 2025-07-31
Payer: MEDICARE

## 2025-07-31 PROCEDURE — 99490 CHRNC CARE MGMT STAFF 1ST 20: CPT | Mod: ,,, | Performed by: FAMILY MEDICINE

## 2025-08-07 ENCOUNTER — PATIENT MESSAGE (OUTPATIENT)
Dept: NEUROLOGY | Facility: CLINIC | Age: 75
End: 2025-08-07
Payer: MEDICARE

## 2025-08-08 ENCOUNTER — TELEPHONE (OUTPATIENT)
Dept: FAMILY MEDICINE | Facility: CLINIC | Age: 75
End: 2025-08-08
Payer: MEDICARE

## 2025-08-08 NOTE — TELEPHONE ENCOUNTER
----- Message from Leticia sent at 8/6/2025  9:07 AM CDT -----  Regarding: Pt needs: a call back about Home Health.  Contact: Shaneka  Pt needs: a call back about Home Health.      Phone #: [phone] Shaneka